# Patient Record
Sex: FEMALE | Race: WHITE | NOT HISPANIC OR LATINO | URBAN - METROPOLITAN AREA
[De-identification: names, ages, dates, MRNs, and addresses within clinical notes are randomized per-mention and may not be internally consistent; named-entity substitution may affect disease eponyms.]

---

## 2020-08-19 ENCOUNTER — APPOINTMENT (RX ONLY)
Dept: URBAN - METROPOLITAN AREA CLINIC 24 | Facility: CLINIC | Age: 75
Setting detail: DERMATOLOGY
End: 2020-08-19

## 2020-08-19 DIAGNOSIS — L85.3 XEROSIS CUTIS: ICD-10-CM

## 2020-08-19 DIAGNOSIS — D485 NEOPLASM OF UNCERTAIN BEHAVIOR OF SKIN: ICD-10-CM

## 2020-08-19 DIAGNOSIS — L29.8 OTHER PRURITUS: ICD-10-CM

## 2020-08-19 DIAGNOSIS — L29.89 OTHER PRURITUS: ICD-10-CM

## 2020-08-19 PROBLEM — D48.5 NEOPLASM OF UNCERTAIN BEHAVIOR OF SKIN: Status: ACTIVE | Noted: 2020-08-19

## 2020-08-19 PROCEDURE — 11102 TANGNTL BX SKIN SINGLE LES: CPT

## 2020-08-19 PROCEDURE — ? COUNSELING

## 2020-08-19 PROCEDURE — 99202 OFFICE O/P NEW SF 15 MIN: CPT | Mod: 25

## 2020-08-19 PROCEDURE — ? OTHER

## 2020-08-19 PROCEDURE — ? BIOPSY BY SHAVE METHOD

## 2020-08-19 ASSESSMENT — LOCATION DETAILED DESCRIPTION DERM
LOCATION DETAILED: RIGHT DISTAL RADIAL PALMAR SMALL FINGER
LOCATION DETAILED: RIGHT SUPERIOR UPPER BACK
LOCATION DETAILED: MIDDLE STERNUM
LOCATION DETAILED: SUPERIOR THORACIC SPINE

## 2020-08-19 ASSESSMENT — LOCATION SIMPLE DESCRIPTION DERM
LOCATION SIMPLE: RIGHT SMALL FINGER
LOCATION SIMPLE: RIGHT UPPER BACK
LOCATION SIMPLE: UPPER BACK
LOCATION SIMPLE: CHEST

## 2020-08-19 ASSESSMENT — LOCATION ZONE DERM
LOCATION ZONE: FINGER
LOCATION ZONE: TRUNK

## 2020-08-19 NOTE — PROCEDURE: OTHER
Note Text (......Xxx Chief Complaint.): This diagnosis correlates with the
Detail Level: Zone
Other (Free Text): Dr Queen assisted with the procedure. \\n\\nDiscussed she may need referral to hand surgeon if this recurs.
Other (Free Text): This only occurs during dialysis and persists despite pre-treatment w antihistamines and normalization of her phosphorus levels. I discussed that this will likely be difficult to treat and may not be curable. \\n\\nI discussed sensitive skin precautions and keeping skin well moisturized as she is xerotic. I also recommended applying cold sarna anti-itch or Cerave anti-itch prior to dialysis. Samples of Cerave given to daughter and sarna written down.

## 2020-08-19 NOTE — HPI: SKIN LESION
What Type Of Note Output Would You Prefer (Optional)?: Standard Output
How Severe Is Your Skin Lesion?: moderate
Has Your Skin Lesion Been Treated?: not been treated
Is This A New Presentation, Or A Follow-Up?: Growth
Additional History: Pt gives verbal consent to biopsy.Kemal

## 2020-08-19 NOTE — PROCEDURE: BIOPSY BY SHAVE METHOD
Consent: Verbal consent was obtained and risks were reviewed including but not limited to scarring, infection, bleeding, scabbing, incomplete removal, nerve damage and allergy to anesthesia.
Bill 27994 For Specimen Handling/Conveyance To Laboratory?: no
Electrodesiccation Text: The wound bed was treated with electrodesiccation after the biopsy was performed.
Wound Care: Vaseline
Additional Anesthesia Volume In Cc (Will Not Render If 0): 0
Depth Of Biopsy: dermis
Anesthesia Volume In Cc: 0.4
Type Of Destruction Used: Curettage
Dressing: bandage
Was A Bandage Applied: Yes
Notification Instructions: Patient will be notified of biopsy results. However, patient instructed to call the office if not contacted within 2 weeks.
Curettage Text: The wound bed was treated with curettage after the biopsy was performed.
Detail Level: Detailed
Cryotherapy Text: The wound bed was treated with cryotherapy after the biopsy was performed.
Electrodesiccation And Curettage Text: The wound bed was treated with electrodesiccation and curettage after the biopsy was performed.
Biopsy Type: H and E
Accession #: [id number]
Billing Type: Third-Party Bill
Silver Nitrate Text: The wound bed was treated with silver nitrate after the biopsy was performed.
Biopsy Method: Personna blade
Post-Care Instructions: I reviewed with the patient in detail post-care instructions. Patient is to keep the biopsy site dry overnight, and then apply vaseline twice daily until healed. Patient may apply hydrogen peroxide soaks to remove any crusting. Patient given a wound care sheet.
Information: Selecting Yes will display possible errors in your note based on the variables you have selected. This validation is only offered as a suggestion for you. PLEASE NOTE THAT THE VALIDATION TEXT WILL BE REMOVED WHEN YOU FINALIZE YOUR NOTE. IF YOU WANT TO FAX A PRELIMINARY NOTE YOU WILL NEED TO TOGGLE THIS TO 'NO' IF YOU DO NOT WANT IT IN YOUR FAXED NOTE.
Hemostasis: Aluminum Chloride and Electrocautery
Anesthesia Type: 1% lidocaine without epinephrine and a 1:10 solution of 8.4% sodium bicarbonate

## 2022-06-25 ENCOUNTER — INPATIENT (INPATIENT)
Facility: HOSPITAL | Age: 77
LOS: 7 days | Discharge: HOME | End: 2022-07-03
Attending: INTERNAL MEDICINE | Admitting: INTERNAL MEDICINE
Payer: MEDICARE

## 2022-06-25 VITALS — WEIGHT: 169.98 LBS

## 2022-06-25 LAB
BASOPHILS # BLD AUTO: 0.09 K/UL — SIGNIFICANT CHANGE UP (ref 0–0.2)
BASOPHILS NFR BLD AUTO: 0.7 % — SIGNIFICANT CHANGE UP (ref 0–2)
EOSINOPHIL # BLD AUTO: 0.61 K/UL — HIGH (ref 0–0.5)
EOSINOPHIL NFR BLD AUTO: 4.8 % — SIGNIFICANT CHANGE UP (ref 0–6)
HCT VFR BLD CALC: 24.1 % — LOW (ref 34.5–45)
HGB BLD-MCNC: 7.8 G/DL — LOW (ref 11.5–15.5)
IMM GRANULOCYTES NFR BLD AUTO: 0.5 % — SIGNIFICANT CHANGE UP (ref 0–1.5)
LYMPHOCYTES # BLD AUTO: 1.17 K/UL — SIGNIFICANT CHANGE UP (ref 1–3.3)
LYMPHOCYTES # BLD AUTO: 9.3 % — LOW (ref 13–44)
MCHC RBC-ENTMCNC: 28.3 PG — SIGNIFICANT CHANGE UP (ref 27–34)
MCHC RBC-ENTMCNC: 32.4 G/DL — SIGNIFICANT CHANGE UP (ref 32–36)
MCV RBC AUTO: 87.3 FL — SIGNIFICANT CHANGE UP (ref 80–100)
MONOCYTES # BLD AUTO: 0.63 K/UL — SIGNIFICANT CHANGE UP (ref 0–0.9)
MONOCYTES NFR BLD AUTO: 5 % — SIGNIFICANT CHANGE UP (ref 2–14)
NEUTROPHILS # BLD AUTO: 10.03 K/UL — HIGH (ref 1.8–7.4)
NEUTROPHILS NFR BLD AUTO: 79.7 % — HIGH (ref 43–77)
NRBC # BLD: 0 /100 WBCS — SIGNIFICANT CHANGE UP (ref 0–0)
PLATELET # BLD AUTO: 326 K/UL — SIGNIFICANT CHANGE UP (ref 150–400)
RBC # BLD: 2.76 M/UL — LOW (ref 3.8–5.2)
RBC # FLD: 15.9 % — HIGH (ref 10.3–14.5)
WBC # BLD: 12.59 K/UL — HIGH (ref 3.8–10.5)
WBC # FLD AUTO: 12.59 K/UL — HIGH (ref 3.8–10.5)

## 2022-06-25 PROCEDURE — 70450 CT HEAD/BRAIN W/O DYE: CPT | Mod: 26,MA

## 2022-06-25 PROCEDURE — 71045 X-RAY EXAM CHEST 1 VIEW: CPT | Mod: 26

## 2022-06-25 PROCEDURE — 93010 ELECTROCARDIOGRAM REPORT: CPT

## 2022-06-25 PROCEDURE — 99285 EMERGENCY DEPT VISIT HI MDM: CPT

## 2022-06-25 RX ORDER — ONDANSETRON 8 MG/1
4 TABLET, FILM COATED ORAL ONCE
Refills: 0 | Status: DISCONTINUED | OUTPATIENT
Start: 2022-06-25 | End: 2022-06-25

## 2022-06-25 NOTE — ED PROVIDER NOTE - NS ED ROS FT
No fever/chills, No photophobia/eye pain/changes in vision, No ear pain/sore throat/dysphagia, No palpitations, no cough/wheeze/stridor, No abdominal pain, No N/V/D, no dysuria/frequency/discharge, No neck/back pain, no rash, no changes in neurological status/function, (+) CP and SOB.

## 2022-06-25 NOTE — ED PROVIDER NOTE - CLINICAL SUMMARY MEDICAL DECISION MAKING FREE TEXT BOX
Patient with sob, does not want dialysis.  Stable on Bipap.  MOLST form completed with daughter.  ICU initially asked to consult until learned of advanced directives.  Patient currently being treated medically for hyperkalemia.  Although d/w nephrology, patient may not be dialyzed in am.  Will need hospice/palliative consult on admission. Patient is to be admitted to the hospital and the case was discussed with the admitting physician.  Any changes in plan, additional imaging/labs, and further work up will be at the discretion of the admitting physician. Per discussion with admitting physician, all necessary consults for admitted patients to be obtained by morning team unless patient requires emergent intervention or medical advice by specialist overnight.

## 2022-06-25 NOTE — ED PROVIDER NOTE - PHYSICAL EXAMINATION
Gen: Alert, NAD  Head: NC, AT   Eyes: PERRL, EOMI, normal lids/conjunctiva  ENT: normal hearing, patent oropharynx without erythema/exudate, uvula midline  Neck: supple, no tenderness, Trachea midline  Pulm: Bilateral BS, normal resp effort, no wheeze/stridor/retractions  CV: RRR, no M/R/G, 2+ radial and dp pulses bl, no edema  Abd: soft, NT/ND, +BS, no hepatosplenomegaly  Mskel: extremities x4 with normal ROM and no joint effusions. no ctl spine ttp.   Skin: no rash, no bruising   Neuro: AAOx3, no sensory/motor deficits, CN 2-12 intact Gen: Alert, distressed, vomiting  Head: NC, AT   Eyes: EOMI, normal lids/conjunctiva  ENT: normal hearing, patent oropharynx without erythema/exudate, uvula midline  Neck: supple, no tenderness, Trachea midline  Pulm: increased resp effort, + retractions, poor air entry  CV: RRR, no M/R/G, 2+ radial and dp pulses bl, no edema  Abd: soft, NT/ND, +BS, no hepatosplenomegaly  Mskel: extremities x4 with normal ROM and no joint effusions. no ctl spine ttp.   Skin: no rash, no bruising   Neuro: AAOx3, no sensory/motor deficits, CN 2-12 intact

## 2022-06-25 NOTE — ED ADULT NURSE NOTE - OBJECTIVE STATEMENT
AOx3, pt came in complaining of SOB. as per pts daughter, pt has had SOB for 2/3 days now. pt is on home O2 2L NC. as per pts daughter, SOB worsened today. pt is using accessory muscles to breathe, pt has bilateral crackles upon auscultation.   pt also complaining of CP 10/10 pain. as per pts daughter, pt always has CP. pt hasn't taken anything for the pain. pts daughter gave her 1 aspirin prior to coming in. pt has bilateral lower extremity swelling.     pmh: asthma, HTN, CHF, ESRD  pts last dialysis June 13th. L arm fistula AOx3, pt came in complaining of SOB. as per pts daughter, pt has had SOB for 2/3 days now. pt is on home O2 2L NC. as per pts daughter, SOB worsened today. pt is using accessory muscles to breathe, pt has bilateral crackles upon auscultation.   pt also complaining of CP 10/10 pain. as per pts daughter, pt always has CP. pt hasn't taken anything for the pain. pts daughter gave her 1 aspirin prior to coming in. pt has bilateral lower extremity swelling. pts BP is extremely elevated 200's/100's.    pmh: asthma, HTN, CHF, ESRD  pts last dialysis June 13th. L arm fistula AOx3, pt came in complaining of SOB. as per pts daughter, pt has had SOB for 2/3 days now. pt is on home O2 2L NC. as per pts daughter, SOB worsened today. pt is using accessory muscles to breathe, pt has bilateral crackles upon auscultation.   pt also complaining of CP 10/10 pain. as per pts daughter, pt always has CP. pt hasn't taken anything for the pain. pts daughter gave her 1 aspirin prior to coming in. pt has bilateral lower extremity swelling. pts BP is extremely elevated 200's/100's. as per pts daughter, pt took nifedipine 120mg and clonidine 0.3mg at 8:30 prior to coming in.   pmh: asthma, HTN, CHF, ESRD  pts last dialysis June 13th. L arm fistula

## 2022-06-25 NOTE — ED ADULT NURSE NOTE - ED STAT RN HANDOFF DETAILS
Report given to receiving RN Katya, pts history, current condition and reason for admission discussed, safety concerns addressed and reviewed, pt currently in stable condition, IV flushes for patency and site shows no signs or symptoms of infiltrate, dressing is clean dry and intact, pt is aware of plan of care. Pt education deemed successful at time of report after patient demonstrates successful teach back for proficiency.

## 2022-06-25 NOTE — ED PROVIDER NOTE - OBJECTIVE STATEMENT
Triage note- sob and chest pain CPAP,  dialisis more than week left arm angela    Pertinent PMH/PSH/FHx/SHx and Review of Systems contained within:   78 y/o F wPMH of HTN, glaucoma, depression and ESRD (TTS) presents to the ED accompanied with daughter for not having dialysis done for x1 week. Pt was recently admitted last week to [facility] for the same reason. Pt c/o of SOB and collapsed at home. Pt In the field was found to be in the x80's on RA. Pt endorses CP, SOB and vomiting in ER. Pt was brought in on cPAP. Denies LOC, vertigo, abd pain, dizziness, leg or calf swelling. Pt is anuric Triage note- sob and chest pain CPAP,  dialisis more than week left arm angela    Pertinent PMH/PSH/FHx/SHx and Review of Systems contained within:   78 y/o F wPMH of HTN, glaucoma, depression and ESRD (TTS) presents to the ED accompanied with daughter for sob not having dialysis done for x2 weeks. Pt was recently admitted to [facility] for the same reason. Pt c/o of SOB and collapsed at home. Pt In the field was found to be in the x80's on RA. Pt endorses CP, SOB in ER. Pt was brought in on cPAP, vomiting, however denies abdominal pain. Denies LOC, vertigo, abd pain, dizziness, leg or calf swelling. Pt is anuric.  Daughter states that the reason patient has not been dialyzed is because she does not want to go.  She wishes to be made hospice care, a process that they started at [facility], but has no home hospice care.  She is DNR/DNI per daughter.

## 2022-06-26 DIAGNOSIS — E87.5 HYPERKALEMIA: ICD-10-CM

## 2022-06-26 DIAGNOSIS — N18.4 CHRONIC KIDNEY DISEASE, STAGE 4 (SEVERE): ICD-10-CM

## 2022-06-26 DIAGNOSIS — Z51.5 ENCOUNTER FOR PALLIATIVE CARE: ICD-10-CM

## 2022-06-26 DIAGNOSIS — J96.90 RESPIRATORY FAILURE, UNSPECIFIED, UNSPECIFIED WHETHER WITH HYPOXIA OR HYPERCAPNIA: ICD-10-CM

## 2022-06-26 DIAGNOSIS — N18.6 END STAGE RENAL DISEASE: ICD-10-CM

## 2022-06-26 DIAGNOSIS — I10 ESSENTIAL (PRIMARY) HYPERTENSION: ICD-10-CM

## 2022-06-26 LAB
ALBUMIN SERPL ELPH-MCNC: 3 G/DL — LOW (ref 3.3–5)
ALP SERPL-CCNC: 85 U/L — SIGNIFICANT CHANGE UP (ref 40–120)
ALT FLD-CCNC: 34 U/L — SIGNIFICANT CHANGE UP (ref 12–78)
AMYLASE P1 CFR SERPL: 66 U/L — SIGNIFICANT CHANGE UP (ref 25–115)
ANION GAP SERPL CALC-SCNC: 14 MMOL/L — SIGNIFICANT CHANGE UP (ref 5–17)
ANION GAP SERPL CALC-SCNC: 17 MMOL/L — SIGNIFICANT CHANGE UP (ref 5–17)
APTT BLD: 35.9 SEC — HIGH (ref 27.5–35.5)
AST SERPL-CCNC: 29 U/L — SIGNIFICANT CHANGE UP (ref 15–37)
BILIRUB SERPL-MCNC: 0.3 MG/DL — SIGNIFICANT CHANGE UP (ref 0.2–1.2)
BLD GP AB SCN SERPL QL: SIGNIFICANT CHANGE UP
BUN SERPL-MCNC: 109 MG/DL — HIGH (ref 7–23)
BUN SERPL-MCNC: 120 MG/DL — HIGH (ref 7–23)
CALCIUM SERPL-MCNC: 8.4 MG/DL — LOW (ref 8.5–10.1)
CALCIUM SERPL-MCNC: 8.8 MG/DL — SIGNIFICANT CHANGE UP (ref 8.5–10.1)
CHLORIDE SERPL-SCNC: 105 MMOL/L — SIGNIFICANT CHANGE UP (ref 96–108)
CHLORIDE SERPL-SCNC: 106 MMOL/L — SIGNIFICANT CHANGE UP (ref 96–108)
CO2 SERPL-SCNC: 16 MMOL/L — LOW (ref 22–31)
CO2 SERPL-SCNC: 19 MMOL/L — LOW (ref 22–31)
CREAT SERPL-MCNC: 16.6 MG/DL — HIGH (ref 0.5–1.3)
CREAT SERPL-MCNC: 16.8 MG/DL — HIGH (ref 0.5–1.3)
EGFR: 2 ML/MIN/1.73M2 — LOW
EGFR: 2 ML/MIN/1.73M2 — LOW
FLUAV AG NPH QL: SIGNIFICANT CHANGE UP
FLUBV AG NPH QL: SIGNIFICANT CHANGE UP
GLUCOSE SERPL-MCNC: 194 MG/DL — HIGH (ref 70–99)
GLUCOSE SERPL-MCNC: 91 MG/DL — SIGNIFICANT CHANGE UP (ref 70–99)
INR BLD: 1.07 RATIO — SIGNIFICANT CHANGE UP (ref 0.88–1.16)
LIDOCAIN IGE QN: 227 U/L — SIGNIFICANT CHANGE UP (ref 73–393)
NT-PROBNP SERPL-SCNC: HIGH PG/ML (ref 0–450)
POTASSIUM SERPL-MCNC: 5.5 MMOL/L — HIGH (ref 3.5–5.3)
POTASSIUM SERPL-MCNC: 6.2 MMOL/L — CRITICAL HIGH (ref 3.5–5.3)
POTASSIUM SERPL-SCNC: 5.5 MMOL/L — HIGH (ref 3.5–5.3)
POTASSIUM SERPL-SCNC: 6.2 MMOL/L — CRITICAL HIGH (ref 3.5–5.3)
PROT SERPL-MCNC: 7.9 GM/DL — SIGNIFICANT CHANGE UP (ref 6–8.3)
PROTHROM AB SERPL-ACNC: 12.9 SEC — SIGNIFICANT CHANGE UP (ref 10.5–13.4)
SARS-COV-2 RNA SPEC QL NAA+PROBE: SIGNIFICANT CHANGE UP
SODIUM SERPL-SCNC: 138 MMOL/L — SIGNIFICANT CHANGE UP (ref 135–145)
SODIUM SERPL-SCNC: 139 MMOL/L — SIGNIFICANT CHANGE UP (ref 135–145)
TROPONIN I, HIGH SENSITIVITY RESULT: 55.6 NG/L — HIGH

## 2022-06-26 PROCEDURE — 99223 1ST HOSP IP/OBS HIGH 75: CPT

## 2022-06-26 RX ORDER — LANOLIN ALCOHOL/MO/W.PET/CERES
3 CREAM (GRAM) TOPICAL AT BEDTIME
Refills: 0 | Status: DISCONTINUED | OUTPATIENT
Start: 2022-06-26 | End: 2022-07-03

## 2022-06-26 RX ORDER — SODIUM ZIRCONIUM CYCLOSILICATE 10 G/10G
10 POWDER, FOR SUSPENSION ORAL ONCE
Refills: 0 | Status: COMPLETED | OUTPATIENT
Start: 2022-06-26 | End: 2022-06-26

## 2022-06-26 RX ORDER — MORPHINE SULFATE 50 MG/1
1 CAPSULE, EXTENDED RELEASE ORAL
Refills: 0 | Status: DISCONTINUED | OUTPATIENT
Start: 2022-06-26 | End: 2022-06-27

## 2022-06-26 RX ORDER — ALBUTEROL 90 UG/1
2.5 AEROSOL, METERED ORAL
Refills: 0 | Status: COMPLETED | OUTPATIENT
Start: 2022-06-26 | End: 2022-06-26

## 2022-06-26 RX ORDER — ACETAMINOPHEN 500 MG
650 TABLET ORAL EVERY 6 HOURS
Refills: 0 | Status: DISCONTINUED | OUTPATIENT
Start: 2022-06-26 | End: 2022-07-03

## 2022-06-26 RX ORDER — ONDANSETRON 8 MG/1
4 TABLET, FILM COATED ORAL EVERY 8 HOURS
Refills: 0 | Status: DISCONTINUED | OUTPATIENT
Start: 2022-06-26 | End: 2022-06-29

## 2022-06-26 RX ORDER — CALCIUM GLUCONATE 100 MG/ML
1 VIAL (ML) INTRAVENOUS ONCE
Refills: 0 | Status: COMPLETED | OUTPATIENT
Start: 2022-06-26 | End: 2022-06-26

## 2022-06-26 RX ADMIN — Medication 100 GRAM(S): at 02:12

## 2022-06-26 RX ADMIN — MORPHINE SULFATE 1 MILLIGRAM(S): 50 CAPSULE, EXTENDED RELEASE ORAL at 18:54

## 2022-06-26 RX ADMIN — ALBUTEROL 2.5 MILLIGRAM(S): 90 AEROSOL, METERED ORAL at 01:55

## 2022-06-26 RX ADMIN — MORPHINE SULFATE 1 MILLIGRAM(S): 50 CAPSULE, EXTENDED RELEASE ORAL at 18:24

## 2022-06-26 RX ADMIN — Medication 10 MILLIGRAM(S): at 00:21

## 2022-06-26 RX ADMIN — SODIUM ZIRCONIUM CYCLOSILICATE 10 GRAM(S): 10 POWDER, FOR SUSPENSION ORAL at 02:47

## 2022-06-26 RX ADMIN — Medication 650 MILLIGRAM(S): at 20:29

## 2022-06-26 RX ADMIN — ALBUTEROL 2.5 MILLIGRAM(S): 90 AEROSOL, METERED ORAL at 02:08

## 2022-06-26 RX ADMIN — ALBUTEROL 2.5 MILLIGRAM(S): 90 AEROSOL, METERED ORAL at 01:34

## 2022-06-26 RX ADMIN — Medication 650 MILLIGRAM(S): at 21:10

## 2022-06-26 NOTE — PROGRESS NOTE ADULT - SUBJECTIVE AND OBJECTIVE BOX
Resting in bed NAD. Afebrile Hemodynamically stable. No acute events.    Physical Exam: Constitutional: AAO  HEENT PERRLA EOMI  CV RRR S1S2  Pulm poor entry b/l, retraction, increased effort   GI soft nontender nondistended + BS   Neuro CN II-XII grossly intact   Extremities no edema or calf tenderness

## 2022-06-26 NOTE — ED ADULT NURSE REASSESSMENT NOTE - COMFORT CARE
po fluids offered/repositioned
plan of care explained/side rails up/wait time explained/warm blanket provided

## 2022-06-26 NOTE — H&P ADULT - ASSESSMENT
This is a 78 yo F with Hx of HTN, glaucoma, depression and ESRD (TTS), presenting to the ED with daughter due to sob and collapse at home. Patient and daughter wish for her to be on hospice care. She has intentionally skipped HD x 2w and as a result has gotten more sob 80% sPO2 on RA requiring cPAP in ED. She was recently admitted to [facility] for the same reason. Pt c/o of SOB and collapsed at home. one episode of nbnb vomiting in ED. Denies LOC, vertigo, abd pain, dizziness, leg or calf swelling. Pt is anuric.  She is DNR/DNI per daughter but would take noninvasive comfort measures. labs significant for wbx 12.5, hgb 7.8, k 6.2, trop 55, pBNP 99k, creat 16    end of life care   HTN  Glaucoma  ESRD on HD  depression   hyperkalemia   anemia of renal disease   -hospice and palliative consult   -morphine prn for air hunger and ativan prn for anxiety  -Supplemental O2 for comfort (would ideally switch to NC or mask with morphine)  -resume home meds and daily labs till palliative establishes more exact goals of care   -regular renal diet   -DNR DNI

## 2022-06-26 NOTE — ED ADULT NURSE REASSESSMENT NOTE - NS ED NURSE REASSESS COMMENT FT1
Spoke to Dr. Azul to d/c Bipap orders and enter new orders for venturi mask.  Pt sat 100% on venturi mask, NAD, pt resting in bed asleep.
Pt placed on 40% ventimask without incident after receiving morphine 1mg iv push. Respirations even and unlabored at this time, spo2 100% on ventimask. Pt had a few sips of water as per request. Pt denies cp, dizziness, n/v, chills, palpitations. NAD noted at this time. Pt is able to make all needs known. Call bell in reach. Comfort and safety maintained.
Report received from Bekah RN, care assumed at this time. Pt received on bipap fio2 40%, spo2 %. Cardiac and spo2 monitoring in place. Pt's daughter Socorro at bedside. Comfort and safety maintained.

## 2022-06-26 NOTE — CONSULT NOTE ADULT - SUBJECTIVE AND OBJECTIVE BOX
Patient chart reviewed, full consult to follow.     Non adherent ESRD fluid overload     Will dialyze today     Thank you for the courtesy of this consultation.         Patient chart reviewed, full consult to follow.     Patient no longer wants HD treatments    Hospice care;     Thank you for the courtesy of this consultation. [facility] NEPHROLOGY SERVICES, [facility]  NEPHROLOGY AND HYPERTENSION  [address]  [address]  [place] [address]  [phone]    MD MATTHEW PATIÑO MD ANDREY GONCHARUK, MD MADHU KORRAPATI, MD YELENA ROSENBERG, MD BINNY KOSHY, MD CHRISTOPHER CAPUTO, MD EDWARD BOVER, MD      Information from chart:  "Patient is a 77y old  Female who presents with a chief complaint of   HPI:  This is a 76 yo F with Hx of HTN, glaucoma, depression and ESRD (TTS), presenting to the ED with daughter due to sob and collapse at home. Patient and daughter wish for her to be on hospice care. She has intentionally skipped HD x 2w and as a result has gotten more sob 80% sPO2 on RA requiring cPAP in ED. She was recently admitted to [facility] for the same reason. Pt c/o of SOB and collapsed at home. one episode of nbnb vomiting in ED. Denies LOC, vertigo, abd pain, dizziness, leg or calf swelling. Pt is anuric.  She is DNR/DNI per daughter but would take noninvasive comfort measures. labs significant for wbx 12.5, hgb 7.8, k 6.2, trop 55, pBNP 99k, creat 16 (26 Jun 2022 06:10)   "  Patient no longer wants to continue HD    PAST MEDICAL & SURGICAL HISTORY:    FAMILY HISTORY:    Allergies    No Known Allergies    Intolerances      Home Medications:    MEDICATIONS  (STANDING):    MEDICATIONS  (PRN):  acetaminophen     Tablet .. 650 milliGRAM(s) Oral every 6 hours PRN Temp greater or equal to 38C (100.4F), Mild Pain (1 - 3)  aluminum hydroxide/magnesium hydroxide/simethicone Suspension 30 milliLiter(s) Oral every 4 hours PRN Dyspepsia  LORazepam   Injectable 1 milliGRAM(s) IV Push every 4 hours PRN Anxiety  melatonin 3 milliGRAM(s) Oral at bedtime PRN Insomnia  morphine  - Injectable 1 milliGRAM(s) IV Push every 2 hours PRN Moderate Pain (4 - 6)  ondansetron Injectable 4 milliGRAM(s) IV Push every 8 hours PRN Nausea and/or Vomiting    Vital Signs Last 24 Hrs  T(C): 36.5 (27 Jun 2022 00:36), Max: 37 (26 Jun 2022 21:04)  T(F): 97.7 (27 Jun 2022 00:36), Max: 98.6 (26 Jun 2022 21:04)  HR: 67 (27 Jun 2022 00:36) (59 - 90)  BP: 198/75 (27 Jun 2022 00:36) (127/65 - 198/96)  BP(mean): --  RR: 17 (27 Jun 2022 00:36) (13 - 18)  SpO2: 98% (27 Jun 2022 00:36) (98% - 100%)    Daily Height in cm: 154.94 (27 Jun 2022 00:36)    Daily     CAPILLARY BLOOD GLUCOSE        PHYSICAL EXAM:      T(C): 36.5 (06-27-22 @ 00:36), Max: 37 (06-26-22 @ 21:04)  HR: 67 (06-27-22 @ 00:36) (59 - 90)  BP: 198/75 (06-27-22 @ 00:36) (127/65 - 198/96)  RR: 17 (06-27-22 @ 00:36) (13 - 18)  SpO2: 98% (06-27-22 @ 00:36) (98% - 100%)  Wt(kg): --  Lungs clear ant decreased BS at bases   Heart S1S2  Abd soft NT ND  Extremities:   tr edema  L avf               06-26    139  |  106  |  120<H>  ----------------------------<  91  5.5<H>   |  19<L>  |  16.80<H>    Ca    8.8      26 Jun 2022 10:40    TPro  7.9  /  Alb  3.0<L>  /  TBili  0.3  /  DBili  x   /  AST  29  /  ALT  34  /  AlkPhos  85  06-25                          7.8    12.59 )-----------( 326      ( 25 Jun 2022 22:58 )             24.1     Creatinine Trend: 16.80<--, 16.60<--          Assessment   Patient no longer wants HD treatments      Plan  Hospice care    Harry Osborn MD      Thank you for the courtesy of this consultation.

## 2022-06-26 NOTE — H&P ADULT - NSHPPHYSICALEXAM_GEN_ALL_CORE
Constitutional: AAO  HEENT PERRLA EOMI  CV RRR S1S2  Pulm poor entry b/l, retraction, increased effort   GI soft nontender nondistended + BS   Neuro CN II-XII grossly intact   Extremities no edema or calf tenderness

## 2022-06-27 DIAGNOSIS — E44.1 MILD PROTEIN-CALORIE MALNUTRITION: ICD-10-CM

## 2022-06-27 DIAGNOSIS — D64.9 ANEMIA, UNSPECIFIED: ICD-10-CM

## 2022-06-27 DIAGNOSIS — R53.1 WEAKNESS: ICD-10-CM

## 2022-06-27 DIAGNOSIS — Z51.5 ENCOUNTER FOR PALLIATIVE CARE: ICD-10-CM

## 2022-06-27 DIAGNOSIS — R06.02 SHORTNESS OF BREATH: ICD-10-CM

## 2022-06-27 DIAGNOSIS — I10 ESSENTIAL (PRIMARY) HYPERTENSION: ICD-10-CM

## 2022-06-27 LAB
ANION GAP SERPL CALC-SCNC: 15 MMOL/L — SIGNIFICANT CHANGE UP (ref 5–17)
BUN SERPL-MCNC: 115 MG/DL — HIGH (ref 7–23)
CALCIUM SERPL-MCNC: 8.1 MG/DL — LOW (ref 8.5–10.1)
CHLORIDE SERPL-SCNC: 104 MMOL/L — SIGNIFICANT CHANGE UP (ref 96–108)
CK MB BLD-MCNC: 1.7 % — SIGNIFICANT CHANGE UP (ref 0–3.5)
CK MB CFR SERPL CALC: 1.9 NG/ML — SIGNIFICANT CHANGE UP (ref 0.5–3.6)
CK SERPL-CCNC: 109 U/L — SIGNIFICANT CHANGE UP (ref 26–192)
CO2 SERPL-SCNC: 18 MMOL/L — LOW (ref 22–31)
CREAT SERPL-MCNC: 17.3 MG/DL — HIGH (ref 0.5–1.3)
EGFR: 2 ML/MIN/1.73M2 — LOW
GLUCOSE BLDC GLUCOMTR-MCNC: 125 MG/DL — HIGH (ref 70–99)
GLUCOSE SERPL-MCNC: 80 MG/DL — SIGNIFICANT CHANGE UP (ref 70–99)
HCT VFR BLD CALC: 21.1 % — LOW (ref 34.5–45)
HCV AB S/CO SERPL IA: 0.14 S/CO — SIGNIFICANT CHANGE UP (ref 0–0.99)
HCV AB SERPL-IMP: SIGNIFICANT CHANGE UP
HGB BLD-MCNC: 6.9 G/DL — CRITICAL LOW (ref 11.5–15.5)
MAGNESIUM SERPL-MCNC: 2.6 MG/DL — SIGNIFICANT CHANGE UP (ref 1.6–2.6)
MCHC RBC-ENTMCNC: 28 PG — SIGNIFICANT CHANGE UP (ref 27–34)
MCHC RBC-ENTMCNC: 32.7 G/DL — SIGNIFICANT CHANGE UP (ref 32–36)
MCV RBC AUTO: 85.8 FL — SIGNIFICANT CHANGE UP (ref 80–100)
NRBC # BLD: 0 /100 WBCS — SIGNIFICANT CHANGE UP (ref 0–0)
NRBC # BLD: 0 /100 WBCS — SIGNIFICANT CHANGE UP (ref 0–0)
PLATELET # BLD AUTO: 265 K/UL — SIGNIFICANT CHANGE UP (ref 150–400)
POTASSIUM SERPL-MCNC: 5.7 MMOL/L — HIGH (ref 3.5–5.3)
POTASSIUM SERPL-SCNC: 5.7 MMOL/L — HIGH (ref 3.5–5.3)
RBC # BLD: 2.46 M/UL — LOW (ref 3.8–5.2)
RBC # FLD: 15.7 % — HIGH (ref 10.3–14.5)
SODIUM SERPL-SCNC: 137 MMOL/L — SIGNIFICANT CHANGE UP (ref 135–145)
TROPONIN I, HIGH SENSITIVITY RESULT: 130 NG/L — HIGH
WBC # BLD: 9.82 K/UL — SIGNIFICANT CHANGE UP (ref 3.8–10.5)
WBC # FLD AUTO: 9.82 K/UL — SIGNIFICANT CHANGE UP (ref 3.8–10.5)

## 2022-06-27 PROCEDURE — 99223 1ST HOSP IP/OBS HIGH 75: CPT

## 2022-06-27 PROCEDURE — 99233 SBSQ HOSP IP/OBS HIGH 50: CPT

## 2022-06-27 PROCEDURE — 93010 ELECTROCARDIOGRAM REPORT: CPT

## 2022-06-27 RX ORDER — IPRATROPIUM/ALBUTEROL SULFATE 18-103MCG
3 AEROSOL WITH ADAPTER (GRAM) INHALATION EVERY 6 HOURS
Refills: 0 | Status: DISCONTINUED | OUTPATIENT
Start: 2022-06-27 | End: 2022-07-01

## 2022-06-27 RX ORDER — HYDRALAZINE HCL 50 MG
10 TABLET ORAL ONCE
Refills: 0 | Status: COMPLETED | OUTPATIENT
Start: 2022-06-27 | End: 2022-06-27

## 2022-06-27 RX ORDER — SODIUM ZIRCONIUM CYCLOSILICATE 10 G/10G
10 POWDER, FOR SUSPENSION ORAL ONCE
Refills: 0 | Status: DISCONTINUED | OUTPATIENT
Start: 2022-06-27 | End: 2022-06-27

## 2022-06-27 RX ORDER — HEPARIN SODIUM 5000 [USP'U]/ML
5000 INJECTION INTRAVENOUS; SUBCUTANEOUS EVERY 12 HOURS
Refills: 0 | Status: DISCONTINUED | OUTPATIENT
Start: 2022-06-27 | End: 2022-07-03

## 2022-06-27 RX ORDER — ASPIRIN/CALCIUM CARB/MAGNESIUM 324 MG
325 TABLET ORAL ONCE
Refills: 0 | Status: COMPLETED | OUTPATIENT
Start: 2022-06-27 | End: 2022-06-27

## 2022-06-27 RX ORDER — CARVEDILOL PHOSPHATE 80 MG/1
6.25 CAPSULE, EXTENDED RELEASE ORAL EVERY 12 HOURS
Refills: 0 | Status: DISCONTINUED | OUTPATIENT
Start: 2022-06-27 | End: 2022-06-28

## 2022-06-27 RX ORDER — AMLODIPINE BESYLATE 2.5 MG/1
5 TABLET ORAL DAILY
Refills: 0 | Status: DISCONTINUED | OUTPATIENT
Start: 2022-06-27 | End: 2022-06-28

## 2022-06-27 RX ORDER — HYDRALAZINE HCL 50 MG
5 TABLET ORAL EVERY 6 HOURS
Refills: 0 | Status: DISCONTINUED | OUTPATIENT
Start: 2022-06-27 | End: 2022-07-03

## 2022-06-27 RX ORDER — LABETALOL HCL 100 MG
10 TABLET ORAL ONCE
Refills: 0 | Status: COMPLETED | OUTPATIENT
Start: 2022-06-27 | End: 2022-06-27

## 2022-06-27 RX ADMIN — Medication 650 MILLIGRAM(S): at 22:12

## 2022-06-27 RX ADMIN — Medication 3 MILLILITER(S): at 14:41

## 2022-06-27 RX ADMIN — Medication 10 MILLIGRAM(S): at 14:23

## 2022-06-27 RX ADMIN — Medication 650 MILLIGRAM(S): at 16:46

## 2022-06-27 RX ADMIN — Medication 10 MILLIGRAM(S): at 14:25

## 2022-06-27 RX ADMIN — ONDANSETRON 4 MILLIGRAM(S): 8 TABLET, FILM COATED ORAL at 14:23

## 2022-06-27 RX ADMIN — Medication 325 MILLIGRAM(S): at 15:46

## 2022-06-27 RX ADMIN — Medication 3 MILLILITER(S): at 17:03

## 2022-06-27 RX ADMIN — Medication 3 MILLIGRAM(S): at 01:30

## 2022-06-27 RX ADMIN — Medication 650 MILLIGRAM(S): at 23:10

## 2022-06-27 RX ADMIN — Medication 650 MILLIGRAM(S): at 13:46

## 2022-06-27 RX ADMIN — Medication 0.1 MILLIGRAM(S): at 13:46

## 2022-06-27 RX ADMIN — Medication 1 MILLIGRAM(S): at 15:16

## 2022-06-27 RX ADMIN — Medication 1 MILLIGRAM(S): at 01:22

## 2022-06-27 NOTE — PROGRESS NOTE ADULT - SUBJECTIVE AND OBJECTIVE BOX
Patient is a 77y old  Female who presents with a chief complaint of       HPI:  This is a 76 yo F with Hx of HTN, glaucoma, depression and ESRD (TTS), presenting to the ED with daughter due to sob and collapse at home. Patient and daughter wish for her to be on hospice care. She has intentionally skipped HD x 2w and as a result has gotten more sob 80% sPO2 on RA requiring cPAP in ED. She was recently admitted to [facility] for the same reason. Pt c/o of SOB and collapsed at home. one episode of nbnb vomiting in ED. Denies LOC, vertigo, abd pain, dizziness, leg or calf swelling. Pt is anuric.  She is DNR/DNI per daughter but would take noninvasive comfort measures. labs significant for wbx 12.5, hgb 7.8, k 6.2, trop 55, pBNP 99k, creat 16 (26 Jun 2022 06:10)      SUBJECTIVE & OBJECTIVE: Pt seen and examined at bedside.   no overnight events.         PHYSICAL EXAM:  T(C): 36.7 (06-27-22 @ 17:08), Max: 37 (06-26-22 @ 21:04)  HR: 71 (06-27-22 @ 17:15) (59 - 78)  BP: 233/112 (06-27-22 @ 17:08) (139/68 - 238/98)  RR: 20 (06-27-22 @ 14:40) (13 - 24)  SpO2: 100% (06-27-22 @ 17:15) (97% - 100%)  Wt(kg): -- Height (cm): 154.9 (06-27 @ 00:36)  Weight (kg): 77.2 (06-27 @ 00:36)  BMI (kg/m2): 32.2 (06-27 @ 00:36)  BSA (m2): 1.76 (06-27 @ 00:36)  I&O's Detail    27 Jun 2022 07:01  -  27 Jun 2022 17:41  --------------------------------------------------------  IN:    Oral Fluid: 780 mL  Total IN: 780 mL    OUT:  Total OUT: 0 mL    Total NET: 780 mL        GENERAL: NAD, well-groomed, well-developed, no increased WOB  HEAD:  Atraumatic, Normocephalic  EYES: EOMI, PERRLA, conjunctiva and sclera clear  ENMT: Moist mucous membranes  NECK: Supple, No JVD  NERVOUS SYSTEM:  Alert & Oriented X3, no focal neuro deficits   CHEST/LUNG: Clear to auscultation bilaterally; No rales, rhonchi, wheezing, or rubs  HEART: Regular rate and rhythm; No murmurs, rubs, or gallops  ABDOMEN: Soft, Nontender, Nondistended; Bowel sounds present  EXTREMITIES:  2+ Peripheral Pulses b/l, No clubbing, cyanosis, calf tenderness or edema b/l    MEDICATIONS  (STANDING):  albuterol/ipratropium for Nebulization 3 milliLiter(s) Nebulizer every 6 hours  amLODIPine   Tablet 5 milliGRAM(s) Oral daily  carvedilol 6.25 milliGRAM(s) Oral every 12 hours  heparin   Injectable 5000 Unit(s) SubCutaneous every 12 hours    MEDICATIONS  (PRN):  acetaminophen     Tablet .. 650 milliGRAM(s) Oral every 6 hours PRN Temp greater or equal to 38C (100.4F), Mild Pain (1 - 3)  aluminum hydroxide/magnesium hydroxide/simethicone Suspension 30 milliLiter(s) Oral every 4 hours PRN Dyspepsia  hydrALAZINE Injectable 5 milliGRAM(s) IV Push every 6 hours PRN if SBP >170 or DBP >100  LORazepam   Injectable 1 milliGRAM(s) IV Push every 4 hours PRN Anxiety  melatonin 3 milliGRAM(s) Oral at bedtime PRN Insomnia  ondansetron Injectable 4 milliGRAM(s) IV Push every 8 hours PRN Nausea and/or Vomiting      LABS:                        7.8    12.59 )-----------( 326      ( 25 Jun 2022 22:58 )             24.1     06-27    137  |  104  |  115<H>  ----------------------------<  80  5.7<H>   |  18<L>  |  17.30<H>    Ca    8.1<L>      27 Jun 2022 07:05  Mg     2.6     06-27    TPro  7.9  /  Alb  3.0<L>  /  TBili  0.3  /  DBili  x   /  AST  29  /  ALT  34  /  AlkPhos  85  06-25    PT/INR - ( 25 Jun 2022 22:58 )   PT: 12.9 sec;   INR: 1.07 ratio         PTT - ( 25 Jun 2022 22:58 )  PTT:35.9 sec    Magnesium, Serum: 2.6 mg/dL (06-27 @ 07:05)    CAPILLARY BLOOD GLUCOSE      POCT Blood Glucose.: 125 mg/dL (27 Jun 2022 14:16)            RECENT CULTURES:      RADIOLOGY & ADDITIONAL TESTS:          Imaging Personally Reviewed:  [ ] YES  [ ] NO    Consultant(s) Notes Reviewed:  [x ] YES  [ ] NO    Care Discussed with Consultants/Other Providers [x ] YES  [ ] NO     HPI:  This is a 76 yo F with Hx of HTN, glaucoma, depression and ESRD (TTS), presenting to the ED with daughter due to sob and collapse at home. Patient and daughter wish for her to be on hospice care. She has intentionally skipped HD x 2w and as a result has gotten more sob 80% sPO2 on RA requiring cPAP in ED. She was recently admitted to [facility] for the same reason. Pt c/o of SOB and collapsed at home. one episode of nbnb vomiting in ED. Denies LOC, vertigo, abd pain, dizziness, leg or calf swelling. Pt is anuric.  She is DNR/DNI per daughter but would take noninvasive comfort measures. labs significant for wbx 12.5, hgb 7.8, k 6.2, trop 55, pBNP 99k, creat 16 (26 Jun 2022 06:10)      SUBJECTIVE & OBJECTIVE:   Pt seen and examined at bedside.   no overnight events.   today was RRT for HTN with SBP 230s, diaphoretic and SOB , given hydralazine 10mg IVP, labetalol 10mg IVP, BIPAP continued. Nephrology Dr. Osborn at bedside to resume HD as patient wishes to continue HD. Patient moved to a HD bed.     +SOB  chronic cough for years, dry   on BIPAP         PHYSICAL EXAM:  T(C): 36.7 (06-27-22 @ 17:08), Max: 37 (06-26-22 @ 21:04)  HR: 71 (06-27-22 @ 17:15) (59 - 78)  BP: 233/112 (06-27-22 @ 17:08) (139/68 - 238/98)  RR: 20 (06-27-22 @ 14:40) (13 - 24)  SpO2: 100% (06-27-22 @ 17:15) (97% - 100%)  Wt(kg): -- Height (cm): 154.9 (06-27 @ 00:36)  Weight (kg): 77.2 (06-27 @ 00:36)  BMI (kg/m2): 32.2 (06-27 @ 00:36)  BSA (m2): 1.76 (06-27 @ 00:36)  I&O's Detail    27 Jun 2022 07:01  -  27 Jun 2022 17:41  --------------------------------------------------------  IN:    Oral Fluid: 780 mL  Total IN: 780 mL    OUT:  Total OUT: 0 mL    Total NET: 780 mL        GENERAL: obese,  on BIPAP,    HEAD:  Atraumatic, Normocephalic  EYES: EOMI, PERRLA, conjunctiva and sclera clear  ENMT: Moist mucous membranes  NECK: Supple, No JVD  NERVOUS SYSTEM:  Alert & Oriented X3, no focal neuro deficits   CHEST/LUNG: good b/l air entry , bibasilar crackles   HEART: Regular rate and rhythm; No murmurs, rubs, or gallops  ABDOMEN: Soft, Nontender, Nondistended; Bowel sounds present  EXTREMITIES:  2+ Peripheral Pulses b/l, No clubbing, cyanosis, calf tenderness or edema b/l    MEDICATIONS  (STANDING):  albuterol/ipratropium for Nebulization 3 milliLiter(s) Nebulizer every 6 hours  amLODIPine   Tablet 5 milliGRAM(s) Oral daily  carvedilol 6.25 milliGRAM(s) Oral every 12 hours  heparin   Injectable 5000 Unit(s) SubCutaneous every 12 hours    MEDICATIONS  (PRN):  acetaminophen     Tablet .. 650 milliGRAM(s) Oral every 6 hours PRN Temp greater or equal to 38C (100.4F), Mild Pain (1 - 3)  aluminum hydroxide/magnesium hydroxide/simethicone Suspension 30 milliLiter(s) Oral every 4 hours PRN Dyspepsia  hydrALAZINE Injectable 5 milliGRAM(s) IV Push every 6 hours PRN if SBP >170 or DBP >100  LORazepam   Injectable 1 milliGRAM(s) IV Push every 4 hours PRN Anxiety  melatonin 3 milliGRAM(s) Oral at bedtime PRN Insomnia  ondansetron Injectable 4 milliGRAM(s) IV Push every 8 hours PRN Nausea and/or Vomiting      LABS:                        7.8    12.59 )-----------( 326      ( 25 Jun 2022 22:58 )             24.1     06-27    137  |  104  |  115<H>  ----------------------------<  80  5.7<H>   |  18<L>  |  17.30<H>    Ca    8.1<L>      27 Jun 2022 07:05  Mg     2.6     06-27    TPro  7.9  /  Alb  3.0<L>  /  TBili  0.3  /  DBili  x   /  AST  29  /  ALT  34  /  AlkPhos  85  06-25    PT/INR - ( 25 Jun 2022 22:58 )   PT: 12.9 sec;   INR: 1.07 ratio         PTT - ( 25 Jun 2022 22:58 )  PTT:35.9 sec    Magnesium, Serum: 2.6 mg/dL (06-27 @ 07:05)    CAPILLARY BLOOD GLUCOSE      POCT Blood Glucose.: 125 mg/dL (27 Jun 2022 14:16)            RECENT CULTURES:      RADIOLOGY & ADDITIONAL TESTS:          Imaging Personally Reviewed:  [ ] YES  [ ] NO    Consultant(s) Notes Reviewed:  [x ] YES  [ ] NO    Care Discussed with Consultants/Other Providers [x ] YES  [ ] NO  Care discussed in detail with patient.  All questions and concerns addressed

## 2022-06-27 NOTE — CONSULT NOTE ADULT - PROBLEM SELECTOR RECOMMENDATION 9
breathing feels improved since arrival  could consider low-dose opioids PRN for dyspnea.  Dilaudid or fentanyl in patient's with renal issues   supplemental oxygen via NC breathing feels improved since arrival  could consider low-dose opioids PRN for dyspnea.  Dilaudid or fentanyl in light of ESRD   supplemental oxygen via NC

## 2022-06-27 NOTE — CONSULT NOTE ADULT - SUBJECTIVE AND OBJECTIVE BOX
HPI:  This is a 76 yo F with Hx of HTN, glaucoma, depression and ESRD (TTS), presenting to the ED with daughter due to sob and collapse at home. Patient and daughter wish for her to be on hospice care. She has intentionally skipped HD x 2w and as a result has gotten more sob 80% sPO2 on RA requiring cPAP in ED. She was recently admitted to [facility] for the same reason. Pt c/o of SOB and collapsed at home. one episode of nbnb vomiting in ED. Denies LOC, vertigo, abd pain, dizziness, leg or calf swelling. Pt is anuric.  She is DNR/DNI per daughter but would take noninvasive comfort measures. labs significant for wbx 12.5, hgb 7.8, k 6.2, trop 55, pBNP 99k, creat 16 (26 Jun 2022 06:10)    PERTINENT PM/SXH:       FAMILY HISTORY:    ITEMS NOT CHECKED ARE NOT PRESENT    SOCIAL HISTORY:   Significant other/partner:  [ ]  Children: yes [ ]  Hoahaoism/Spirituality: Judaism  Substance hx:  [ ]   Tobacco hx:  [ ]   Alcohol hx: [ ]   Home Opioid hx:  [ ] I-Stop Reference No: Reference #: [id number]  Living Situation: [x ]Home  [ ]Long term care  [ ]Rehab [ ]Other    ADVANCE DIRECTIVES:    DNR  MOLST  [x ]  Living Will  [ ]   DECISION MAKER(s):  [ ] Health Care Proxy(s)  [ x] Surrogate(s)  [ ] Guardian           Name(s): Phone Number(s): Socorro Toro [phone]    BASELINE (I)ADL(s) (prior to admission):  [place]: [ ]Total  [x ] Moderate [ ]Dependent    Allergies    No Known Allergies    Intolerances    MEDICATIONS  (STANDING):  amLODIPine   Tablet 5 milliGRAM(s) Oral daily    MEDICATIONS  (PRN):  acetaminophen     Tablet .. 650 milliGRAM(s) Oral every 6 hours PRN Temp greater or equal to 38C (100.4F), Mild Pain (1 - 3)  aluminum hydroxide/magnesium hydroxide/simethicone Suspension 30 milliLiter(s) Oral every 4 hours PRN Dyspepsia  LORazepam   Injectable 1 milliGRAM(s) IV Push every 4 hours PRN Anxiety  melatonin 3 milliGRAM(s) Oral at bedtime PRN Insomnia  morphine  - Injectable 1 milliGRAM(s) IV Push every 2 hours PRN Moderate Pain (4 - 6)  ondansetron Injectable 4 milliGRAM(s) IV Push every 8 hours PRN Nausea and/or Vomiting    PRESENT SYMPTOMS: [ ]Unable to obtain due to poor mentation   Source if other than patient:  [ ]Family   [ ]Team     Pain: [x ] yes [ ] no  QOL impact -   Location -   head                 Aggravating factors -  Quality -  Radiation -  Timing-  Severity (0-10 scale): 4/10  Minimal acceptable level (0-10 scale):     PAIN AD Score:     http://geriatrictoolkit.[email or web address]/cog/painad.pdf (press ctrl +  left click to view)    Dyspnea:                           [ ]Mild [ ]Moderate [ ]Severe  Anxiety:                             [ ]Mild [ ]Moderate [ ]Severe  Fatigue:                             [ ]Mild [ ]Moderate [ ]Severe  Nausea:                             [ ]Mild [x ]Moderate [ ]Severe  Loss of appetite:              [ ]Mild [ x]Moderate [ ]Severe  Constipation:                    [ ]Mild [x ]Moderate [ ]Severe    Other Symptoms:  [ ]All other review of systems negative     Karnofsky Performance Score/Palliative Performance Status Version 2:        40-50 %    http://npcrc.org/files/news/palliative_performance_scale_ppsv2.pdf  PHYSICAL EXAM:  Vital Signs Last 24 Hrs  T(C): 36.8 (27 Jun 2022 11:44), Max: 37 (26 Jun 2022 21:04)  T(F): 98.2 (27 Jun 2022 11:44), Max: 98.6 (26 Jun 2022 21:04)  HR: 72 (27 Jun 2022 13:38) (59 - 78)  BP: 205/80 (27 Jun 2022 13:38) (139/68 - 215/103)  BP(mean): --  RR: 18 (27 Jun 2022 11:44) (13 - 18)  SpO2: 99% (27 Jun 2022 12:12) (97% - 100%) I&O's Summary  GENERAL:  [ ]Alert  [ ]Oriented x   [ ]Lethargic  [ ]Cachexia  [ ]Unarousable  [ ]Verbal  [ ]Non-Verbal  Behavioral:   [ ] Anxiety  [ ] Delirium [ ] Agitation [ ] Other  HEENT:  [ ]Normal   [ ]Dry mouth   [ ]ET Tube/Trach  [ ]Oral lesions  PULMONARY:   [ ]Clear [ ]Tachypnea  [ ]Audible excessive secretions   [ ]Rhonchi        [ ]Right [ ]Left [ ]Bilateral  [ ]Crackles        [ ]Right [ ]Left [ ]Bilateral  [ ]Wheezing     [ ]Right [ ]Left [ ]Bilateral  CARDIOVASCULAR:    [ ]Regular [ ]Irregular [ ]Tachy  [ ]Timmy [ ]Murmur [ ]Other  GASTROINTESTINAL:  [ ]Soft  [ ]Distended   [ ]+BS  [ ]Non tender [ ]Tender  [ ]PEG [ ]OGT/ NGT  Last BM: 6/27/22 GENITOURINARY:  [ x]Normal [ ] Incontinent   [ ]Oliguria/Anuria   [ ]Nielson  MUSCULOSKELETAL:   [ ]Normal   [x ]Weakness  [ ]Bed/Wheelchair bound [ ]Edema  NEUROLOGIC:   [x ]No focal deficits  [ ] Cognitive impairment  [ ] Dysphagia [ ]Dysarthria [ ] Paresis [ ]Other   SKIN:   [x ]Normal   [ ]Pressure ulcer(s)  [ ]Rash    CRITICAL CARE:  [ ] Shock Present  [ ]Septic [ ]Cardiogenic [ ]Neurologic [ ]Hypovolemic  [ ]  Vasopressors [ ]  Inotropes   [ ] Respiratory failure present [ ] mechanical ventilation [ ] non-invasive ventilatory support [ ] High flow  [ ] Acute  [ ] Chronic [ ] Hypoxic  [ ] Hypercarbic [ ] Other  [ ] Other organ failure     LABS:                        7.8    12.59 )-----------( 326      ( 25 Jun 2022 22:58 )             24.1   06-27    137  |  104  |  115<H>  ----------------------------<  80  5.7<H>   |  18<L>  |  17.30<H>    Ca    8.1<L>      27 Jun 2022 07:05  Mg     2.6     06-27    TPro  7.9  /  Alb  3.0<L>  /  TBili  0.3  /  DBili  x   /  AST  29  /  ALT  34  /  AlkPhos  85  06-25  PT/INR - ( 25 Jun 2022 22:58 )   PT: 12.9 sec;   INR: 1.07 ratio         PTT - ( 25 Jun 2022 22:58 )  PTT:35.9 sec    RADIOLOGY & ADDITIONAL STUDIES:   < from: CT Head No Cont (06.25.22 @ 23:57) >  FINDINGS:  Parenchymal volume loss isnoted with prominent ventricles and sulci.   Chronic microvascular ischemic changes are identified. No acute   territorial infarct is demonstrated.  There is no evidence of an acute hemorrhage or mass-effect in the   posterior fossa or in the supratentorial region.  Evaluation of the osseous structures with the appropriate window appears   unremarkable. Vascular calcifications are noted. Sequela of bilateral   lens surgery is seen. A small right mastoid air cell effusion is noted.   The visualized paranasal sinuses and left mastoid air cells are clear.  IMPRESSION:  No acute intracranial hemorrhage, territorial infarct or mass effect.  --- End of Report ---  < end of copied text >    < from: Xray Chest 1 View- PORTABLE-Urgent (Xray Chest 1 View- PORTABLE-Urgent .) (06.25.22 @ 23:12) >  IMPRESSION:  1. Predominantly diffuse alveolar right-sided infiltrates favoring   pneumonia. There is relative sparing of the left lung. Only linear   atelectasis can be seen laterally within the left lung.  2. Marginally enlarged cardiac silhouette with some central venous   engorgement.  3. Degenerative changes of the spine.  --- End of Report ---  < end of copied text >    PROTEIN CALORIE MALNUTRITION PRESENT: [x ] Yes [ ] No  [ ] PPSV2 < or = to 30% [ ] significant weight loss  [x ] poor nutritional intake [ ] catabolic state [ ] anasarca     Artificial Nutrition [ ]     REFERRALS:   [ ]Chaplaincy  [ ] Hospice  [ ]Child Life  [ ]Social Work  [ ]Case management [ ]Holistic Therapy     Goals of Care Document:

## 2022-06-27 NOTE — CONSULT NOTE ADULT - PROBLEM SELECTOR RECOMMENDATION 7
Patient with DNR/I on MOLST in chart.  Patient said she wants to live and is ok with continuing HD as she is not ready for hospice or to die.  We discussed that if her goals change, she can reconsider hospice. Patient to resume HD today.     Discussed with Dr. Osborn and message sent to Dr. Henry

## 2022-06-27 NOTE — CONSULT NOTE ADULT - PROBLEM SELECTOR RECOMMENDATION 2
patient said she did not have HD x 1 week  said she understands she will die without HD, but during our conversation said she wants to live and does not want hospice at this time and wants to continue with HD  HD planned for today

## 2022-06-27 NOTE — PATIENT PROFILE ADULT - FALL HARM RISK - HARM RISK INTERVENTIONS
Assistance with ambulation/Assistance OOB with selected safe patient handling equipment/Communicate Risk of Fall with Harm to all staff/Discuss with provider need for PT consult/Monitor for mental status changes/Monitor gait and stability/Provide patient with walking aids - walker, cane, crutches/Reinforce activity limits and safety measures with patient and family/Reorient to person, place and time as needed/Review medications for side effects contributing to fall risk/Tailored Fall Risk Interventions/Toileting schedule using arm’s reach rule for commode and bathroom/Use of alarms - bed, chair and/or voice tab/Visual Cue: Yellow wristband and red socks/Bed in lowest position, wheels locked, appropriate side rails in place/Call bell, personal items and telephone in reach/Instruct patient to call for assistance before getting out of bed or chair/Non-slip footwear when patient is out of bed/[place] to call system/Physically safe environment - no spills, clutter or unnecessary equipment/Purposeful Proactive Rounding/Room/bathroom lighting operational, light cord in reach

## 2022-06-27 NOTE — PROGRESS NOTE ADULT - SUBJECTIVE AND OBJECTIVE BOX
Patient wishes to resume HD treatments;   Discussed with the medical team, Dr. Henry   Will resume today. [facility] NEPHROLOGY SERVICES, [facility]  NEPHROLOGY AND HYPERTENSION  [address]  [address]  [place] [address]  [phone]    MD MATTHEW PATIÑO, MD NAYAN HOUSE, MD CARLITO PAYNE, MD RON BRANCH, MD COLBY RODRIGUEZ MD          Patient events noted  She wishes to resume HD treatments;   Noted MAP elevated; initially symptomatic   Now with sob and diaphoresis;       MEDICATIONS  (STANDING):  albuterol/ipratropium for Nebulization 3 milliLiter(s) Nebulizer every 6 hours  amLODIPine   Tablet 5 milliGRAM(s) Oral daily  hydrALAZINE Injectable 10 milliGRAM(s) IV Push once  Clonidine 0.1 mg given     MEDICATIONS  (PRN):  acetaminophen     Tablet .. 650 milliGRAM(s) Oral every 6 hours PRN Temp greater or equal to 38C (100.4F), Mild Pain (1 - 3)  aluminum hydroxide/magnesium hydroxide/simethicone Suspension 30 milliLiter(s) Oral every 4 hours PRN Dyspepsia  LORazepam   Injectable 1 milliGRAM(s) IV Push every 4 hours PRN Anxiety  melatonin 3 milliGRAM(s) Oral at bedtime PRN Insomnia  ondansetron Injectable 4 milliGRAM(s) IV Push every 8 hours PRN Nausea and/or Vomiting      PHYSICAL EXAM:      T(C): 36.8 (06-27-22 @ 11:44), Max: 37 (06-26-22 @ 21:04)  HR: 72 (06-27-22 @ 13:38) (59 - 78)  BP: 205/80 (06-27-22 @ 13:38) (139/68 - 215/103)  RR: 18 (06-27-22 @ 11:44) (13 - 18)  SpO2: 99% (06-27-22 @ 12:12) (97% - 100%)  Wt(kg): --  Lungs clear  Heart S1S2  Abd soft NT ND  Extremities:   tr edema                                    7.8    12.59 )-----------( 326      ( 25 Jun 2022 22:58 )             24.1     06-27    137  |  104  |  115<H>  ----------------------------<  80  5.7<H>   |  18<L>  |  17.30<H>    Ca    8.1<L>      27 Jun 2022 07:05  Mg     2.6     06-27    TPro  7.9  /  Alb  3.0<L>  /  TBili  0.3  /  DBili  x   /  AST  29  /  ALT  34  /  AlkPhos  85  06-25      LIVER FUNCTIONS - ( 25 Jun 2022 22:58 )  Alb: 3.0 g/dL / Pro: 7.9 gm/dL / ALK PHOS: 85 U/L / ALT: 34 U/L / AST: 29 U/L / GGT: x           Creatinine Trend: 17.30<--, 16.80<--, 16.60<--    Assessment   ESRD; non adherent for 1-2 weeks, initially want hospice, now requesting to resume HD.   Accelerated HTN; sob and diaphoresis, r/o ACS;     Plan  Clonidine 0.1; IV hydralazine; Coreg;   Cardiac enzymes, EKG;   HD monitored setting; will arrange for daily for 3 days as tolerated.      Harry Osborn MD

## 2022-06-28 DIAGNOSIS — R11.0 NAUSEA: ICD-10-CM

## 2022-06-28 LAB
ANION GAP SERPL CALC-SCNC: 10 MMOL/L — SIGNIFICANT CHANGE UP (ref 5–17)
BUN SERPL-MCNC: 25 MG/DL — HIGH (ref 7–23)
CALCIUM SERPL-MCNC: 8.7 MG/DL — SIGNIFICANT CHANGE UP (ref 8.5–10.1)
CHLORIDE SERPL-SCNC: 98 MMOL/L — SIGNIFICANT CHANGE UP (ref 96–108)
CO2 SERPL-SCNC: 28 MMOL/L — SIGNIFICANT CHANGE UP (ref 22–31)
CREAT SERPL-MCNC: 5.29 MG/DL — HIGH (ref 0.5–1.3)
EGFR: 8 ML/MIN/1.73M2 — LOW
GLUCOSE SERPL-MCNC: 79 MG/DL — SIGNIFICANT CHANGE UP (ref 70–99)
HAV IGM SER-ACNC: SIGNIFICANT CHANGE UP
HBV CORE IGM SER-ACNC: SIGNIFICANT CHANGE UP
HBV SURFACE AG SER-ACNC: SIGNIFICANT CHANGE UP
HCT VFR BLD CALC: 19.6 % — CRITICAL LOW (ref 34.5–45)
HCT VFR BLD CALC: 24.5 % — LOW (ref 34.5–45)
HCV AB S/CO SERPL IA: 0.17 S/CO — SIGNIFICANT CHANGE UP (ref 0–0.99)
HCV AB SERPL-IMP: SIGNIFICANT CHANGE UP
HGB BLD-MCNC: 6.7 G/DL — CRITICAL LOW (ref 11.5–15.5)
HGB BLD-MCNC: 8 G/DL — LOW (ref 11.5–15.5)
MCHC RBC-ENTMCNC: 27.5 PG — SIGNIFICANT CHANGE UP (ref 27–34)
MCHC RBC-ENTMCNC: 28.5 PG — SIGNIFICANT CHANGE UP (ref 27–34)
MCHC RBC-ENTMCNC: 32.7 G/DL — SIGNIFICANT CHANGE UP (ref 32–36)
MCHC RBC-ENTMCNC: 34.2 G/DL — SIGNIFICANT CHANGE UP (ref 32–36)
MCV RBC AUTO: 83.4 FL — SIGNIFICANT CHANGE UP (ref 80–100)
MCV RBC AUTO: 84.2 FL — SIGNIFICANT CHANGE UP (ref 80–100)
NRBC # BLD: 0 /100 WBCS — SIGNIFICANT CHANGE UP (ref 0–0)
PLATELET # BLD AUTO: 217 K/UL — SIGNIFICANT CHANGE UP (ref 150–400)
PLATELET # BLD AUTO: 224 K/UL — SIGNIFICANT CHANGE UP (ref 150–400)
POTASSIUM SERPL-MCNC: 3.3 MMOL/L — LOW (ref 3.5–5.3)
POTASSIUM SERPL-SCNC: 3.3 MMOL/L — LOW (ref 3.5–5.3)
RBC # BLD: 2.35 M/UL — LOW (ref 3.8–5.2)
RBC # BLD: 2.91 M/UL — LOW (ref 3.8–5.2)
RBC # FLD: 15.5 % — HIGH (ref 10.3–14.5)
RBC # FLD: 15.8 % — HIGH (ref 10.3–14.5)
SODIUM SERPL-SCNC: 136 MMOL/L — SIGNIFICANT CHANGE UP (ref 135–145)
TROPONIN I, HIGH SENSITIVITY RESULT: 116.9 NG/L — HIGH
WBC # BLD: 7.74 K/UL — SIGNIFICANT CHANGE UP (ref 3.8–10.5)
WBC # BLD: 7.83 K/UL — SIGNIFICANT CHANGE UP (ref 3.8–10.5)
WBC # FLD AUTO: 7.74 K/UL — SIGNIFICANT CHANGE UP (ref 3.8–10.5)
WBC # FLD AUTO: 7.83 K/UL — SIGNIFICANT CHANGE UP (ref 3.8–10.5)

## 2022-06-28 PROCEDURE — 99497 ADVNCD CARE PLAN 30 MIN: CPT

## 2022-06-28 PROCEDURE — 99233 SBSQ HOSP IP/OBS HIGH 50: CPT

## 2022-06-28 RX ORDER — HYDRALAZINE HCL 50 MG
10 TABLET ORAL THREE TIMES A DAY
Refills: 0 | Status: DISCONTINUED | OUTPATIENT
Start: 2022-06-28 | End: 2022-06-29

## 2022-06-28 RX ORDER — CARVEDILOL PHOSPHATE 80 MG/1
25 CAPSULE, EXTENDED RELEASE ORAL EVERY 12 HOURS
Refills: 0 | Status: DISCONTINUED | OUTPATIENT
Start: 2022-06-28 | End: 2022-07-03

## 2022-06-28 RX ORDER — ACETAMINOPHEN 500 MG
1000 TABLET ORAL ONCE
Refills: 0 | Status: DISCONTINUED | OUTPATIENT
Start: 2022-06-28 | End: 2022-06-30

## 2022-06-28 RX ORDER — CARVEDILOL PHOSPHATE 80 MG/1
12.5 CAPSULE, EXTENDED RELEASE ORAL EVERY 12 HOURS
Refills: 0 | Status: DISCONTINUED | OUTPATIENT
Start: 2022-06-28 | End: 2022-06-28

## 2022-06-28 RX ORDER — NIFEDIPINE 30 MG
60 TABLET, EXTENDED RELEASE 24 HR ORAL DAILY
Refills: 0 | Status: DISCONTINUED | OUTPATIENT
Start: 2022-06-29 | End: 2022-07-03

## 2022-06-28 RX ORDER — ASPIRIN/CALCIUM CARB/MAGNESIUM 324 MG
81 TABLET ORAL DAILY
Refills: 0 | Status: DISCONTINUED | OUTPATIENT
Start: 2022-06-28 | End: 2022-07-03

## 2022-06-28 RX ORDER — PANTOPRAZOLE SODIUM 20 MG/1
40 TABLET, DELAYED RELEASE ORAL
Refills: 0 | Status: DISCONTINUED | OUTPATIENT
Start: 2022-06-28 | End: 2022-07-03

## 2022-06-28 RX ORDER — SENNA PLUS 8.6 MG/1
2 TABLET ORAL AT BEDTIME
Refills: 0 | Status: DISCONTINUED | OUTPATIENT
Start: 2022-06-28 | End: 2022-07-03

## 2022-06-28 RX ORDER — ACETAMINOPHEN 500 MG
1000 TABLET ORAL ONCE
Refills: 0 | Status: COMPLETED | OUTPATIENT
Start: 2022-06-28 | End: 2022-06-28

## 2022-06-28 RX ORDER — AMLODIPINE BESYLATE 2.5 MG/1
5 TABLET ORAL DAILY
Refills: 0 | Status: DISCONTINUED | OUTPATIENT
Start: 2022-06-28 | End: 2022-06-28

## 2022-06-28 RX ORDER — ATORVASTATIN CALCIUM 80 MG/1
80 TABLET, FILM COATED ORAL AT BEDTIME
Refills: 0 | Status: DISCONTINUED | OUTPATIENT
Start: 2022-06-28 | End: 2022-07-03

## 2022-06-28 RX ADMIN — Medication 3 MILLILITER(S): at 11:25

## 2022-06-28 RX ADMIN — ATORVASTATIN CALCIUM 80 MILLIGRAM(S): 80 TABLET, FILM COATED ORAL at 22:37

## 2022-06-28 RX ADMIN — Medication 650 MILLIGRAM(S): at 07:48

## 2022-06-28 RX ADMIN — Medication 650 MILLIGRAM(S): at 08:25

## 2022-06-28 RX ADMIN — Medication 650 MILLIGRAM(S): at 17:03

## 2022-06-28 RX ADMIN — CARVEDILOL PHOSPHATE 12.5 MILLIGRAM(S): 80 CAPSULE, EXTENDED RELEASE ORAL at 17:04

## 2022-06-28 RX ADMIN — ONDANSETRON 4 MILLIGRAM(S): 8 TABLET, FILM COATED ORAL at 08:20

## 2022-06-28 RX ADMIN — Medication 0.2 MILLIGRAM(S): at 17:57

## 2022-06-28 RX ADMIN — Medication 10 MILLIGRAM(S): at 22:37

## 2022-06-28 RX ADMIN — Medication 3 MILLILITER(S): at 05:36

## 2022-06-28 RX ADMIN — Medication 3 MILLILITER(S): at 00:44

## 2022-06-28 RX ADMIN — Medication 1 MILLIGRAM(S): at 08:22

## 2022-06-28 RX ADMIN — AMLODIPINE BESYLATE 5 MILLIGRAM(S): 2.5 TABLET ORAL at 17:04

## 2022-06-28 RX ADMIN — ONDANSETRON 4 MILLIGRAM(S): 8 TABLET, FILM COATED ORAL at 16:33

## 2022-06-28 RX ADMIN — Medication 5 MILLIGRAM(S): at 16:30

## 2022-06-28 RX ADMIN — Medication 0.5 MILLIGRAM(S): at 01:04

## 2022-06-28 RX ADMIN — Medication 650 MILLIGRAM(S): at 16:29

## 2022-06-28 NOTE — PROGRESS NOTE ADULT - SUBJECTIVE AND OBJECTIVE BOX
HPI:  This is a 76 yo F with Hx of HTN, glaucoma, depression and ESRD (TTS), presenting to the ED with daughter due to sob and collapse at home. Patient and daughter wish for her to be on hospice care. She has intentionally skipped HD x 2w and as a result has gotten more sob 80% sPO2 on RA requiring cPAP in ED. She was recently admitted to [facility] for the same reason. Pt c/o of SOB and collapsed at home. one episode of nbnb vomiting in ED. Denies LOC, vertigo, abd pain, dizziness, leg or calf swelling. Pt is anuric.  She is DNR/DNI per daughter but would take noninvasive comfort measures. labs significant for wbx 12.5, hgb 7.8, k 6.2, trop 55, pBNP 99k, creat 16 (26 Jun 2022 06:10)      SUBJECTIVE & OBJECTIVE:   Pt seen and examined at bedside.   no overnight events.   now off BIPAP, tolerating NC, improved SOB after HD           PHYSICAL EXAM:  Vital Signs Last 24 Hrs  T(C): 37.2 (28 Jun 2022 16:03), Max: 37.6 (28 Jun 2022 11:05)  T(F): 99 (28 Jun 2022 16:03), Max: 99.7 (28 Jun 2022 11:05)  HR: 86 (28 Jun 2022 18:15) (69 - 95)  BP: 194/110 (28 Jun 2022 18:15) (90/50 - 224/110)  BP(mean): --  RR: 18 (28 Jun 2022 16:24) (18 - 20)  SpO2: 100% (28 Jun 2022 18:14) (95% - 100%)        GENERAL: obese, NAD, speaking in full sentences, not using accessory muscles , on NC   HEAD:  Atraumatic, Normocephalic  EYES: EOMI, PERRLA, conjunctiva and sclera clear  ENMT: Moist mucous membranes  NECK: Supple, No JVD  NERVOUS SYSTEM:  Alert & Oriented X3, no focal neuro deficits   CHEST/LUNG: good b/l air entry , bibasilar crackles   HEART: Regular rate and rhythm; No murmurs, rubs, or gallops  ABDOMEN: Soft, Nontender, Nondistended; Bowel sounds present  EXTREMITIES:  2+ Peripheral Pulses b/l, No clubbing, cyanosis, calf tenderness or edema b/l    MEDICATIONS  (STANDING):  albuterol/ipratropium for Nebulization 3 milliLiter(s) Nebulizer every 6 hours  amLODIPine   Tablet 5 milliGRAM(s) Oral daily  carvedilol 6.25 milliGRAM(s) Oral every 12 hours  heparin   Injectable 5000 Unit(s) SubCutaneous every 12 hours    MEDICATIONS  (PRN):  acetaminophen     Tablet .. 650 milliGRAM(s) Oral every 6 hours PRN Temp greater or equal to 38C (100.4F), Mild Pain (1 - 3)  aluminum hydroxide/magnesium hydroxide/simethicone Suspension 30 milliLiter(s) Oral every 4 hours PRN Dyspepsia  hydrALAZINE Injectable 5 milliGRAM(s) IV Push every 6 hours PRN if SBP >170 or DBP >100  LORazepam   Injectable 1 milliGRAM(s) IV Push every 4 hours PRN Anxiety  melatonin 3 milliGRAM(s) Oral at bedtime PRN Insomnia  ondansetron Injectable 4 milliGRAM(s) IV Push every 8 hours PRN Nausea and/or Vomiting      LABS:                                   8.0    7.74  )-----------( 224      ( 28 Jun 2022 08:05 )             24.5   06-28    136  |  98  |  25<H>  ----------------------------<  79  3.3<L>   |  28  |  5.29<H>    Ca    8.7      28 Jun 2022 17:10  Mg     2.6     06-27         PTT - ( 25 Jun 2022 22:58 )  PTT:35.9 sec    Magnesium, Serum: 2.6 mg/dL (06-27 @ 07:05)    CAPILLARY BLOOD GLUCOSE      POCT Blood Glucose.: 125 mg/dL (27 Jun 2022 14:16)            RECENT CULTURES:      RADIOLOGY & ADDITIONAL TESTS:          Imaging Personally Reviewed:  [ ] YES  [ ] NO    Consultant(s) Notes Reviewed:  [x ] YES  [ ] NO    Care Discussed with Consultants/Other Providers [x ] YES  [ ] NO  Care discussed in detail with patient and daughter at bedside.  All questions and concerns addressed

## 2022-06-28 NOTE — PROGRESS NOTE ADULT - CONVERSATION DETAILS
Patient said she would like to continue HD, but agrees with DNR/I and no feeding tube.  New MOLST completed indicating DNR/I/No feeding tube.

## 2022-06-28 NOTE — PROGRESS NOTE ADULT - SUBJECTIVE AND OBJECTIVE BOX
SUBJECTIVE AND OBJECTIVE: Patient resting in bed c/o headache  INTERVAL HPI/OVERNIGHT EVENTS: RRT yesterday for HTN and shortness of breath was placed on NIV    DNR on chart: yes   Allergies    No Known Allergies    Intolerances    MEDICATIONS  (STANDING):  albuterol/ipratropium for Nebulization 3 milliLiter(s) Nebulizer every 6 hours  carvedilol 6.25 milliGRAM(s) Oral every 12 hours  heparin   Injectable 5000 Unit(s) SubCutaneous every 12 hours    MEDICATIONS  (PRN):  acetaminophen     Tablet .. 650 milliGRAM(s) Oral every 6 hours PRN Temp greater or equal to 38C (100.4F), Mild Pain (1 - 3)  aluminum hydroxide/magnesium hydroxide/simethicone Suspension 30 milliLiter(s) Oral every 4 hours PRN Dyspepsia  hydrALAZINE Injectable 5 milliGRAM(s) IV Push every 6 hours PRN if SBP >170 or DBP >100  LORazepam   Injectable 1 milliGRAM(s) IV Push every 4 hours PRN Anxiety  melatonin 3 milliGRAM(s) Oral at bedtime PRN Insomnia  ondansetron Injectable 4 milliGRAM(s) IV Push every 8 hours PRN Nausea and/or Vomiting      ITEMS UNCHECKED ARE NOT PRESENT    PRESENT SYMPTOMS: [ ]Unable to obtain due to poor mentation   Source if other than patient:  [ ]Family   [ ]Team     Pain:  [ x]yes [ ]no  QOL impact -   Location - head                   Aggravating factors -  Quality -  Radiation -  Timing-  Severity (0-10 scale):  Minimal acceptable level (0-10 scale):     Dyspnea:                           [ ]Mild [ ]Moderate [ ]Severe  Anxiety:                             [ ]Mild [ ]Moderate [ ]Severe  Fatigue:                             [x ]Mild [ ]Moderate [ ]Severe  Nausea:                             [x ]Mild [ ]Moderate [ ]Severe  Loss of appetite:              [ ]Mild [x ]Moderate [ ]Severe  Constipation:                    [ ]Mild [ ]Moderate [ ]Severe    PAIN AD Score:	  http://geriatrictoolkit.[employer].edu/cog/painad.pdf (Ctrl + left click to view)    Other Symptoms:  [x ]All other review of systems negative     Palliative Performance Status Version 2:         40%      http://npcrc.org/files/news/palliative_performance_scale_ppsv2.pdf  PHYSICAL EXAM:  Vital Signs Last 24 Hrs  T(C): 37.1 (28 Jun 2022 11:20), Max: 37.6 (28 Jun 2022 11:05)  T(F): 98.7 (28 Jun 2022 11:20), Max: 99.7 (28 Jun 2022 11:05)  HR: 83 (28 Jun 2022 11:26) (69 - 92)  BP: 133/94 (28 Jun 2022 11:20) (90/50 - 238/98)  BP(mean): --  RR: 18 (28 Jun 2022 11:20) (18 - 24)  SpO2: 97% (28 Jun 2022 11:26) (95% - 100%) I&O's Summary    27 Jun 2022 07:01  -  28 Jun 2022 07:00  --------------------------------------------------------  IN: 1190 mL / OUT: 1500 mL / NET: -310 mL       GENERAL:  [ x]Alert  [ ]Oriented x   [ ]Lethargic  [ ]Cachexia  [ ]Unarousable  [x ]Verbal  [ ]Non-Verbal  Behavioral:   [ ]Anxiety  [ ]Delirium [ ]Agitation [ ]Other  HEENT:  [ ]Normal   [ x]Dry mouth   [ ]ET Tube/Trach  [ ]Oral lesions  PULMONARY:   [ ]Clear [ ]Tachypnea  [ ]Audible excessive secretions   [ ]Rhonchi        [ ]Right [ ]Left [ ]Bilateral  [ x]Crackles        [ x]Right [ ]Left [ ]Bilateral  [ ]Wheezing     [ ]Right [ ]Left [ ]Bilateral  [ ]Diminished BS [ ] Right [ ]Left [ ]Bilateral  CARDIOVASCULAR:    [ x]Regular [ ]Irregular [ ]Tachy  [ ]Timmy [ ]Murmur [ ]Other  GASTROINTESTINAL:  [x ]Soft  [ ]Distended   [ ]+BS  [x ]Non tender [ ]Tender  [ ]PEG [ ]OGT/ NGT   Last BM:  6/27/22  GENITOURINARY:  [x ]Normal [ ]Incontinent   [ ]Oliguria/Anuria   [ ]Nielson  MUSCULOSKELETAL:   [ ]Normal   [x ]Weakness  [ ]Bed/Wheelchair bound [ ]Edema  NEUROLOGIC:   [x ]No focal deficits  [ ] Cognitive impairment  [ ] Dysphagia [ ]Dysarthria [ ] Paresis [ ]Other   SKIN:   [ x]Normal  [ ]Rash   [ ]Pressure ulcer(s) [ ]y [ ]n present on admission    CRITICAL CARE:  [ ]Shock Present  [ ]Septic [ ]Cardiogenic [ ]Neurologic [ ]Hypovolemic  [ ]Vasopressors [ ]Inotropes  [ ]Respiratory failure present [ ]Mechanical Ventilation [ ]Non-invasive ventilatory support [ ]High-Flow  [ ]Acute  [ ]Chronic [ ]Hypoxic  [ ]Hypercarbic [ ]Other  [ ]Other organ failure     LABS:                        8.0    7.74  )-----------( 224      ( 28 Jun 2022 08:05 )             24.5   06-27    137  |  104  |  115<H>  ----------------------------<  80  5.7<H>   |  18<L>  |  17.30<H>    Ca    8.1<L>      27 Jun 2022 07:05  Mg     2.6     06-27    RADIOLOGY & ADDITIONAL STUDIES: none new    Protein Calorie Malnutrition Present: [ ]mild [x ]moderate [ ]severe [ ]underweight [ ]morbid obesity  https://www.andeal.org/vault/2440/web/files/ONC/Table_Clinical%20Characteristics%20to%20Document%20Malnutrition-White%20JV%20et%20al%202012.pdf    Height (cm): 154.9 (06-27-22 @ 00:36)  Weight (kg): 77.2 (06-27-22 @ 00:36)  BMI (kg/m2): 32.2 (06-27-22 @ 00:36)    [x ]PPSV2 < or = 30%  [ ]significant weight loss [ ]poor nutritional intake [ ]anasarca    [ ]Artificial Nutrition    REFERRALS:   [ ]Chaplaincy  [ ]Hospice  [ ]Child Life  [ ]Social Work  [ ]Case management [ ]Holistic Therapy     Goals of Care Document:

## 2022-06-28 NOTE — PROGRESS NOTE ADULT - SUBJECTIVE AND OBJECTIVE BOX
[facility] NEPHROLOGY SERVICES, [facility]  NEPHROLOGY AND HYPERTENSION  [address]  [address]  [place] [address]  [phone]    MD MATTHEW PATIÑO MD ANDREY GONCHARUK, MD MADHU KORRAPATI, MD YELENA ROSENBERG, MD BINNY KOSHY, MD CHRISTOPHER CAPUTO, MD COLBY RODRIGUEZ MD          Patient events noted  no acute distress    MEDICATIONS  (STANDING):  albuterol/ipratropium for Nebulization 3 milliLiter(s) Nebulizer every 6 hours  aspirin enteric coated 81 milliGRAM(s) Oral daily  atorvastatin 80 milliGRAM(s) Oral at bedtime  carvedilol 25 milliGRAM(s) Oral every 12 hours  cloNIDine 0.2 milliGRAM(s) Oral every 12 hours  heparin   Injectable 5000 Unit(s) SubCutaneous every 12 hours  hydrALAZINE 10 milliGRAM(s) Oral three times a day  pantoprazole    Tablet 40 milliGRAM(s) Oral before breakfast  senna 2 Tablet(s) Oral at bedtime    MEDICATIONS  (PRN):  acetaminophen     Tablet .. 650 milliGRAM(s) Oral every 6 hours PRN Temp greater or equal to 38C (100.4F), Mild Pain (1 - 3)  acetaminophen   IVPB .. 1000 milliGRAM(s) IV Intermittent once PRN pain  aluminum hydroxide/magnesium hydroxide/simethicone Suspension 30 milliLiter(s) Oral every 4 hours PRN Dyspepsia  hydrALAZINE Injectable 5 milliGRAM(s) IV Push every 6 hours PRN if SBP >170 or DBP >100  LORazepam   Injectable 1 milliGRAM(s) IV Push every 4 hours PRN Anxiety  melatonin 3 milliGRAM(s) Oral at bedtime PRN Insomnia  ondansetron Injectable 4 milliGRAM(s) IV Push every 8 hours PRN Nausea and/or Vomiting      06-27-22 @ 07:01  -  06-28-22 @ 07:00  --------------------------------------------------------  IN: 1190 mL / OUT: 1500 mL / NET: -310 mL    06-28-22 @ 07:01  -  06-28-22 @ 23:04  --------------------------------------------------------  IN: 240 mL / OUT: 2500 mL / NET: -2260 mL      PHYSICAL EXAM:      T(C): 37.2 (06-28-22 @ 16:03), Max: 37.6 (06-28-22 @ 11:05)  HR: 76 (06-28-22 @ 21:56) (76 - 95)  BP: 194/89 (06-28-22 @ 21:29) (90/50 - 224/110)  RR: 18 (06-28-22 @ 21:29) (18 - 20)  SpO2: 100% (06-28-22 @ 21:56) (97% - 100%)  Wt(kg): --  Lungs clear  Heart S1S2  Abd soft NT ND  Extremities:   tr edema                                    8.0    7.74  )-----------( 224      ( 28 Jun 2022 08:05 )             24.5     06-28    136  |  98  |  25<H>  ----------------------------<  79  3.3<L>   |  28  |  5.29<H>    Ca    8.7      28 Jun 2022 17:10  Mg     2.6     06-27          Creatinine Trend: 5.29<--, 17.30<--, 16.80<--, 16.60<--    Assessment   ESRD; non adherent for 1-2 weeks, initially want hospice, now requesting to resume HD.   Accelerated HTN; sob and diaphoresis, r/o ACS;     Plan  BP medication adjustments  Use RUE for BP readings  Daily HD  Will follow       Harry Osborn MD

## 2022-06-29 LAB
ANION GAP SERPL CALC-SCNC: 12 MMOL/L — SIGNIFICANT CHANGE UP (ref 5–17)
BUN SERPL-MCNC: 30 MG/DL — HIGH (ref 7–23)
CALCIUM SERPL-MCNC: 8.7 MG/DL — SIGNIFICANT CHANGE UP (ref 8.5–10.1)
CHLORIDE SERPL-SCNC: 99 MMOL/L — SIGNIFICANT CHANGE UP (ref 96–108)
CO2 SERPL-SCNC: 27 MMOL/L — SIGNIFICANT CHANGE UP (ref 22–31)
CREAT SERPL-MCNC: 6.4 MG/DL — HIGH (ref 0.5–1.3)
EGFR: 6 ML/MIN/1.73M2 — LOW
FERRITIN SERPL-MCNC: 1237 NG/ML — HIGH (ref 15–150)
GLUCOSE SERPL-MCNC: 79 MG/DL — SIGNIFICANT CHANGE UP (ref 70–99)
HCT VFR BLD CALC: 21.5 % — LOW (ref 34.5–45)
HGB BLD-MCNC: 7.1 G/DL — LOW (ref 11.5–15.5)
MAGNESIUM SERPL-MCNC: 2.2 MG/DL — SIGNIFICANT CHANGE UP (ref 1.6–2.6)
MCHC RBC-ENTMCNC: 28.1 PG — SIGNIFICANT CHANGE UP (ref 27–34)
MCHC RBC-ENTMCNC: 33 G/DL — SIGNIFICANT CHANGE UP (ref 32–36)
MCV RBC AUTO: 85 FL — SIGNIFICANT CHANGE UP (ref 80–100)
NRBC # BLD: 0 /100 WBCS — SIGNIFICANT CHANGE UP (ref 0–0)
PHOSPHATE SERPL-MCNC: 5 MG/DL — HIGH (ref 2.5–4.5)
PLATELET # BLD AUTO: 198 K/UL — SIGNIFICANT CHANGE UP (ref 150–400)
POTASSIUM SERPL-MCNC: 3.7 MMOL/L — SIGNIFICANT CHANGE UP (ref 3.5–5.3)
POTASSIUM SERPL-SCNC: 3.7 MMOL/L — SIGNIFICANT CHANGE UP (ref 3.5–5.3)
RBC # BLD: 2.53 M/UL — LOW (ref 3.8–5.2)
RBC # FLD: 16 % — HIGH (ref 10.3–14.5)
SODIUM SERPL-SCNC: 138 MMOL/L — SIGNIFICANT CHANGE UP (ref 135–145)
VIT B12 SERPL-MCNC: 418 PG/ML — SIGNIFICANT CHANGE UP (ref 232–1245)
WBC # BLD: 5.98 K/UL — SIGNIFICANT CHANGE UP (ref 3.8–10.5)
WBC # FLD AUTO: 5.98 K/UL — SIGNIFICANT CHANGE UP (ref 3.8–10.5)

## 2022-06-29 PROCEDURE — 71250 CT THORAX DX C-: CPT | Mod: 26

## 2022-06-29 PROCEDURE — 99497 ADVNCD CARE PLAN 30 MIN: CPT

## 2022-06-29 PROCEDURE — 93010 ELECTROCARDIOGRAM REPORT: CPT

## 2022-06-29 PROCEDURE — 99233 SBSQ HOSP IP/OBS HIGH 50: CPT

## 2022-06-29 RX ORDER — HYDRALAZINE HCL 50 MG
25 TABLET ORAL EVERY 8 HOURS
Refills: 0 | Status: DISCONTINUED | OUTPATIENT
Start: 2022-06-29 | End: 2022-06-29

## 2022-06-29 RX ORDER — HYDRALAZINE HCL 50 MG
10 TABLET ORAL EVERY 8 HOURS
Refills: 0 | Status: DISCONTINUED | OUTPATIENT
Start: 2022-06-29 | End: 2022-07-01

## 2022-06-29 RX ORDER — CALCIUM GLUCONATE 100 MG/ML
1 VIAL (ML) INTRAVENOUS ONCE
Refills: 0 | Status: COMPLETED | OUTPATIENT
Start: 2022-06-29 | End: 2022-06-29

## 2022-06-29 RX ORDER — PREGABALIN 225 MG/1
1000 CAPSULE ORAL DAILY
Refills: 0 | Status: DISCONTINUED | OUTPATIENT
Start: 2022-06-29 | End: 2022-07-03

## 2022-06-29 RX ORDER — METOCLOPRAMIDE HCL 10 MG
5 TABLET ORAL EVERY 8 HOURS
Refills: 0 | Status: DISCONTINUED | OUTPATIENT
Start: 2022-06-29 | End: 2022-07-03

## 2022-06-29 RX ORDER — CALCIUM ACETATE 667 MG
667 TABLET ORAL
Refills: 0 | Status: DISCONTINUED | OUTPATIENT
Start: 2022-06-29 | End: 2022-07-03

## 2022-06-29 RX ORDER — CHLORHEXIDINE GLUCONATE 213 G/1000ML
1 SOLUTION TOPICAL DAILY
Refills: 0 | Status: DISCONTINUED | OUTPATIENT
Start: 2022-06-29 | End: 2022-07-03

## 2022-06-29 RX ADMIN — Medication 0.2 MILLIGRAM(S): at 17:25

## 2022-06-29 RX ADMIN — HEPARIN SODIUM 5000 UNIT(S): 5000 INJECTION INTRAVENOUS; SUBCUTANEOUS at 17:27

## 2022-06-29 RX ADMIN — Medication 650 MILLIGRAM(S): at 13:06

## 2022-06-29 RX ADMIN — Medication 5 MILLIGRAM(S): at 15:19

## 2022-06-29 RX ADMIN — Medication 10 MILLIGRAM(S): at 05:07

## 2022-06-29 RX ADMIN — CARVEDILOL PHOSPHATE 25 MILLIGRAM(S): 80 CAPSULE, EXTENDED RELEASE ORAL at 17:25

## 2022-06-29 RX ADMIN — Medication 0.2 MILLIGRAM(S): at 05:07

## 2022-06-29 RX ADMIN — Medication 60 MILLIGRAM(S): at 05:06

## 2022-06-29 RX ADMIN — Medication 100 GRAM(S): at 19:54

## 2022-06-29 RX ADMIN — Medication 10 MILLIGRAM(S): at 21:41

## 2022-06-29 RX ADMIN — Medication 5 MILLIGRAM(S): at 00:01

## 2022-06-29 RX ADMIN — Medication 650 MILLIGRAM(S): at 15:06

## 2022-06-29 RX ADMIN — Medication 3 MILLILITER(S): at 00:04

## 2022-06-29 RX ADMIN — CARVEDILOL PHOSPHATE 25 MILLIGRAM(S): 80 CAPSULE, EXTENDED RELEASE ORAL at 05:07

## 2022-06-29 RX ADMIN — Medication 3 MILLILITER(S): at 05:15

## 2022-06-29 RX ADMIN — HEPARIN SODIUM 5000 UNIT(S): 5000 INJECTION INTRAVENOUS; SUBCUTANEOUS at 05:08

## 2022-06-29 RX ADMIN — CHLORHEXIDINE GLUCONATE 1 APPLICATION(S): 213 SOLUTION TOPICAL at 17:26

## 2022-06-29 RX ADMIN — Medication 3 MILLILITER(S): at 17:48

## 2022-06-29 RX ADMIN — PREGABALIN 1000 MICROGRAM(S): 225 CAPSULE ORAL at 15:20

## 2022-06-29 RX ADMIN — ATORVASTATIN CALCIUM 80 MILLIGRAM(S): 80 TABLET, FILM COATED ORAL at 21:41

## 2022-06-29 RX ADMIN — PANTOPRAZOLE SODIUM 40 MILLIGRAM(S): 20 TABLET, DELAYED RELEASE ORAL at 05:06

## 2022-06-29 RX ADMIN — Medication 81 MILLIGRAM(S): at 15:19

## 2022-06-29 NOTE — PROGRESS NOTE ADULT - SUBJECTIVE AND OBJECTIVE BOX
[facility] NEPHROLOGY SERVICES, [facility]  NEPHROLOGY AND HYPERTENSION  [address]  [address]  [place] [address]  [phone]    MD MATTHEW PATIÑO MD ANDREY GONCHARUK, MD MADHU KORRAPATI, MD YELENA ROSENBERG, MD BINNY KOSHY, MD CHRISTOPHER CAPUTO, MD EDWARD BOVER, MD          Patient events noted    MEDICATIONS  (STANDING):  albuterol/ipratropium for Nebulization 3 milliLiter(s) Nebulizer every 6 hours  aspirin enteric coated 81 milliGRAM(s) Oral daily  atorvastatin 80 milliGRAM(s) Oral at bedtime  calcium acetate 667 milliGRAM(s) Oral three times a day with meals  carvedilol 25 milliGRAM(s) Oral every 12 hours  chlorhexidine 2% Cloths 1 Application(s) Topical daily  cloNIDine 0.2 milliGRAM(s) Oral every 12 hours  cyanocobalamin 1000 MICROGram(s) Oral daily  heparin   Injectable 5000 Unit(s) SubCutaneous every 12 hours  hydrALAZINE 10 milliGRAM(s) Oral every 8 hours  NIFEdipine XL 60 milliGRAM(s) Oral daily  pantoprazole    Tablet 40 milliGRAM(s) Oral before breakfast  senna 2 Tablet(s) Oral at bedtime    MEDICATIONS  (PRN):  acetaminophen     Tablet .. 650 milliGRAM(s) Oral every 6 hours PRN Temp greater or equal to 38C (100.4F), Mild Pain (1 - 3)  acetaminophen   IVPB .. 1000 milliGRAM(s) IV Intermittent once PRN pain  aluminum hydroxide/magnesium hydroxide/simethicone Suspension 30 milliLiter(s) Oral every 4 hours PRN Dyspepsia  hydrALAZINE Injectable 5 milliGRAM(s) IV Push every 6 hours PRN if SBP >170 or DBP >100  LORazepam   Injectable 1 milliGRAM(s) IV Push every 4 hours PRN Anxiety  melatonin 3 milliGRAM(s) Oral at bedtime PRN Insomnia  metoclopramide Injectable 5 milliGRAM(s) IV Push every 8 hours PRN nausea/vomiting      06-28-22 @ 07:01  -  06-29-22 @ 07:00  --------------------------------------------------------  IN: 240 mL / OUT: 2500 mL / NET: -2260 mL    06-29-22 @ 07:01  -  06-29-22 @ 22:17  --------------------------------------------------------  IN: 0 mL / OUT: 3000 mL / NET: -3000 mL      PHYSICAL EXAM:      T(C): 37 (06-29-22 @ 19:10), Max: 37.4 (06-28-22 @ 23:46)  HR: 66 (06-29-22 @ 21:38) (66 - 94)  BP: 142/65 (06-29-22 @ 21:38) (111/78 - 209/82)  RR: 17 (06-29-22 @ 19:10) (16 - 20)  SpO2: 100% (06-29-22 @ 21:15) (94% - 100%)  Wt(kg): --  Lungs clear  Heart S1S2  Abd soft NT ND  Extremities:   tr edema                                    7.1    5.98  )-----------( 198      ( 29 Jun 2022 07:10 )             21.5     06-29    138  |  99  |  30<H>  ----------------------------<  79  3.7   |  27  |  6.40<H>    Ca    8.7      29 Jun 2022 07:10  Phos  5.0     06-29  Mg     2.2     06-29          Creatinine Trend: 6.40<--, 5.29<--, 17.30<--, 16.80<--, 16.60<--      Assessment   ESRD: maintenance  HTN labile  Improved     Plan:  Maintenance HD  UF as tolerated  PRBC tx;   Outpatient arrangement at [facility]  HD for Friday    Harry Osborn MD

## 2022-06-29 NOTE — CHART NOTE - NSCHARTNOTEFT_GEN_A_CORE
Spoke with patient's daughter, Socorro Toro who was planning to visit and discuss plans for ongoing HD with her mother and the care team.  Patient was in HD this morning and spoke with daughter who was aware and planning to come up later.  Called Socorro and left voicemail to let her know patient was returning back to her room, but no return call.  Will continue to follow for continued GOC conversations.
Called by ED physician to evaluate Ms. Knight. History obtained from Daughter and Health care proxy Yovanny Toro [phone]. Patient is a 77 year old woman with a PMHx of ESRD (L arm fistula), HTN, CHF, Asthma, depression and dementia. Per daughter she moved back to [place] in December 2021 from [place]. There she was in hospice care. Was living in [place] and recently moved to [place] on Saturday.     Patient presented to the ED for shortness of breath. Her last HD was on 6/13. Her saturation was in the 80's and was placed on BIPAP with improvement. Labs significant for hyperkalemia and elevated BNP. BP also elevated, 198/96. Per daughter, she was recently hospitalized at [facility] for the same reason. She was getting HD there. However since discharge, the daughter stated the patient is tired and doesn't want to do anymore hemodialysis. She has been refusing to go. She is DNR and DNI. She doesn't want invasive lines or any feeding tube. She would like limited treatment and hospice to be called. Dr. Christine informed. Molst form completed with health care proxy.
Critical value received for H/H 6.7/19.6 . 1 unit PRBC ordered . Called to obtain blood transfusion consent. Pt alert and oriented x 3 . Risk Vs benefits explained to the patient . Patient verbalizes understanding . Consent obtained , witnessed by RN . Pt made aware of possible reaction symptoms and will notify RN if need be. Pt keeps taking off the bipap and desaturating to the 70s. Pt also anxious and requested for anti anxiety meds and sleeping aid. ativan 0.5 mg IVP x 1 ordered . At present pt is otherwise in NAD

## 2022-06-29 NOTE — GOALS OF CARE CONVERSATION - ADVANCED CARE PLANNING - CONVERSATION DETAILS
Met with patient and her daughter, Socorro at the bedside.  Patient said she wants to continue HD and is not ready to die.  We discussed that in the past she has declined HD on the outside and she said she doesn't like HD because of the way it makes her feel, but in spite of that wants to continue HD.  We discussed that if at some point in the future she decides to stop HD, she could receive the support of hospice.  DNR/I on MOLST in chart.

## 2022-06-29 NOTE — PROGRESS NOTE ADULT - SUBJECTIVE AND OBJECTIVE BOX
HPI:  This is a 78 yo F with Hx of HTN, glaucoma, depression and ESRD (TTS), presenting to the ED with daughter due to sob and collapse at home. Patient and daughter wish for her to be on hospice care. She has intentionally skipped HD x 2w and as a result has gotten more sob 80% sPO2 on RA requiring cPAP in ED. She was recently admitted to [facility] for the same reason. Pt c/o of SOB and collapsed at home. one episode of nbnb vomiting in ED. Denies LOC, vertigo, abd pain, dizziness, leg or calf swelling. Pt is anuric.  She is DNR/DNI per daughter but would take noninvasive comfort measures. labs significant for wbx 12.5, hgb 7.8, k 6.2, trop 55, pBNP 99k, creat 16 (26 Jun 2022 06:10)      SUBJECTIVE & OBJECTIVE:   Pt seen and examined at bedside.   no overnight events.     tolerating NC     REVIEW OF SYSTEMS: remaining ROS negative            PHYSICAL EXAM:  Vital Signs Last 24 Hrs  T(C): 36.8 (29 Jun 2022 12:45), Max: 37.4 (28 Jun 2022 23:46)  T(F): 98.2 (29 Jun 2022 12:45), Max: 99.3 (28 Jun 2022 23:46)  HR: 89 (29 Jun 2022 12:45) (68 - 95)  BP: 111/78 (29 Jun 2022 12:45) (111/78 - 224/110)  BP(mean): --  RR: 16 (29 Jun 2022 12:45) (16 - 20)  SpO2: 95% (29 Jun 2022 12:45) (94% - 100%) on NC 2L         GENERAL: obese, NAD, speaking in full sentences, not using accessory muscles , on NC   HEAD:  Atraumatic, Normocephalic  EYES: EOMI, PERRLA, conjunctiva and sclera clear  ENMT: Moist mucous membranes  NECK: Supple, No JVD  NERVOUS SYSTEM:  Alert & Oriented X3, no focal neuro deficits   CHEST/LUNG: good b/l air entry , bibasilar crackles   HEART: Regular rate and rhythm; No murmurs, rubs, or gallops  ABDOMEN: Soft, Nontender, Nondistended; Bowel sounds present  EXTREMITIES:  2+ Peripheral Pulses b/l, No clubbing, cyanosis, calf tenderness or edema b/l    MEDICATIONS  (STANDING):  albuterol/ipratropium for Nebulization 3 milliLiter(s) Nebulizer every 6 hours  amLODIPine   Tablet 5 milliGRAM(s) Oral daily  carvedilol 6.25 milliGRAM(s) Oral every 12 hours  heparin   Injectable 5000 Unit(s) SubCutaneous every 12 hours    MEDICATIONS  (PRN):  acetaminophen     Tablet .. 650 milliGRAM(s) Oral every 6 hours PRN Temp greater or equal to 38C (100.4F), Mild Pain (1 - 3)  aluminum hydroxide/magnesium hydroxide/simethicone Suspension 30 milliLiter(s) Oral every 4 hours PRN Dyspepsia  hydrALAZINE Injectable 5 milliGRAM(s) IV Push every 6 hours PRN if SBP >170 or DBP >100  LORazepam   Injectable 1 milliGRAM(s) IV Push every 4 hours PRN Anxiety  melatonin 3 milliGRAM(s) Oral at bedtime PRN Insomnia  ondansetron Injectable 4 milliGRAM(s) IV Push every 8 hours PRN Nausea and/or Vomiting      LABS:                                   7.1    5.98  )-----------( 198      ( 29 Jun 2022 07:10 )             21.5   06-29    138  |  99  |  30<H>  ----------------------------<  79  3.7   |  27  |  6.40<H>    Ca    8.7      29 Jun 2022 07:10  Phos  5.0     06-29  Mg     2.2     06-29        Magnesium, Serum: 2.6 mg/dL (06-27 @ 07:05)    CAPILLARY BLOOD GLUCOSE      POCT Blood Glucose.: 125 mg/dL (27 Jun 2022 14:16)            RECENT CULTURES:      RADIOLOGY & ADDITIONAL TESTS:          Imaging Personally Reviewed:  [ ] YES  [ ] NO    Consultant(s) Notes Reviewed:  [x ] YES  [ ] NO    Care Discussed with Consultants/Other Providers [x ] YES  [ ] NO  Care discussed in detail with patient and daughter at bedside.  All questions and concerns addressed

## 2022-06-30 LAB
ANION GAP SERPL CALC-SCNC: 8 MMOL/L — SIGNIFICANT CHANGE UP (ref 5–17)
BUN SERPL-MCNC: 17 MG/DL — SIGNIFICANT CHANGE UP (ref 7–23)
CALCIUM SERPL-MCNC: 8.5 MG/DL — SIGNIFICANT CHANGE UP (ref 8.5–10.1)
CHLORIDE SERPL-SCNC: 99 MMOL/L — SIGNIFICANT CHANGE UP (ref 96–108)
CO2 SERPL-SCNC: 29 MMOL/L — SIGNIFICANT CHANGE UP (ref 22–31)
CREAT SERPL-MCNC: 4.56 MG/DL — HIGH (ref 0.5–1.3)
EGFR: 9 ML/MIN/1.73M2 — LOW
GLUCOSE SERPL-MCNC: 107 MG/DL — HIGH (ref 70–99)
HCT VFR BLD CALC: 22.9 % — LOW (ref 34.5–45)
HGB BLD-MCNC: 7.6 G/DL — LOW (ref 11.5–15.5)
MAGNESIUM SERPL-MCNC: 1.9 MG/DL — SIGNIFICANT CHANGE UP (ref 1.6–2.6)
MCHC RBC-ENTMCNC: 27.7 PG — SIGNIFICANT CHANGE UP (ref 27–34)
MCHC RBC-ENTMCNC: 33.2 G/DL — SIGNIFICANT CHANGE UP (ref 32–36)
MCV RBC AUTO: 83.6 FL — SIGNIFICANT CHANGE UP (ref 80–100)
NRBC # BLD: 0 /100 WBCS — SIGNIFICANT CHANGE UP (ref 0–0)
PHOSPHATE SERPL-MCNC: 3.3 MG/DL — SIGNIFICANT CHANGE UP (ref 2.5–4.5)
PLATELET # BLD AUTO: 183 K/UL — SIGNIFICANT CHANGE UP (ref 150–400)
POTASSIUM SERPL-MCNC: 3.2 MMOL/L — LOW (ref 3.5–5.3)
POTASSIUM SERPL-SCNC: 3.2 MMOL/L — LOW (ref 3.5–5.3)
RBC # BLD: 2.74 M/UL — LOW (ref 3.8–5.2)
RBC # FLD: 16.2 % — HIGH (ref 10.3–14.5)
SODIUM SERPL-SCNC: 136 MMOL/L — SIGNIFICANT CHANGE UP (ref 135–145)
WBC # BLD: 5.82 K/UL — SIGNIFICANT CHANGE UP (ref 3.8–10.5)
WBC # FLD AUTO: 5.82 K/UL — SIGNIFICANT CHANGE UP (ref 3.8–10.5)

## 2022-06-30 PROCEDURE — 99232 SBSQ HOSP IP/OBS MODERATE 35: CPT

## 2022-06-30 PROCEDURE — 99233 SBSQ HOSP IP/OBS HIGH 50: CPT

## 2022-06-30 RX ORDER — POTASSIUM CHLORIDE 20 MEQ
40 PACKET (EA) ORAL ONCE
Refills: 0 | Status: COMPLETED | OUTPATIENT
Start: 2022-06-30 | End: 2022-06-30

## 2022-06-30 RX ADMIN — SENNA PLUS 2 TABLET(S): 8.6 TABLET ORAL at 21:05

## 2022-06-30 RX ADMIN — Medication 3 MILLILITER(S): at 00:23

## 2022-06-30 RX ADMIN — ATORVASTATIN CALCIUM 80 MILLIGRAM(S): 80 TABLET, FILM COATED ORAL at 21:05

## 2022-06-30 RX ADMIN — Medication 667 MILLIGRAM(S): at 09:01

## 2022-06-30 RX ADMIN — Medication 81 MILLIGRAM(S): at 12:22

## 2022-06-30 RX ADMIN — Medication 10 MILLIGRAM(S): at 21:05

## 2022-06-30 RX ADMIN — PREGABALIN 1000 MICROGRAM(S): 225 CAPSULE ORAL at 12:21

## 2022-06-30 RX ADMIN — HEPARIN SODIUM 5000 UNIT(S): 5000 INJECTION INTRAVENOUS; SUBCUTANEOUS at 17:29

## 2022-06-30 RX ADMIN — Medication 10 MILLIGRAM(S): at 06:00

## 2022-06-30 RX ADMIN — Medication 60 MILLIGRAM(S): at 06:00

## 2022-06-30 RX ADMIN — Medication 667 MILLIGRAM(S): at 17:29

## 2022-06-30 RX ADMIN — Medication 0.2 MILLIGRAM(S): at 06:00

## 2022-06-30 RX ADMIN — CARVEDILOL PHOSPHATE 25 MILLIGRAM(S): 80 CAPSULE, EXTENDED RELEASE ORAL at 17:29

## 2022-06-30 RX ADMIN — PANTOPRAZOLE SODIUM 40 MILLIGRAM(S): 20 TABLET, DELAYED RELEASE ORAL at 06:00

## 2022-06-30 RX ADMIN — CARVEDILOL PHOSPHATE 25 MILLIGRAM(S): 80 CAPSULE, EXTENDED RELEASE ORAL at 06:00

## 2022-06-30 RX ADMIN — CHLORHEXIDINE GLUCONATE 1 APPLICATION(S): 213 SOLUTION TOPICAL at 12:22

## 2022-06-30 RX ADMIN — HEPARIN SODIUM 5000 UNIT(S): 5000 INJECTION INTRAVENOUS; SUBCUTANEOUS at 06:00

## 2022-06-30 RX ADMIN — Medication 10 MILLIGRAM(S): at 13:20

## 2022-06-30 RX ADMIN — Medication 40 MILLIEQUIVALENT(S): at 12:21

## 2022-06-30 RX ADMIN — Medication 667 MILLIGRAM(S): at 12:21

## 2022-06-30 RX ADMIN — Medication 0.2 MILLIGRAM(S): at 17:29

## 2022-06-30 NOTE — PHYSICAL THERAPY INITIAL EVALUATION ADULT - BED MOBILITY TRAINING, PT EVAL
Patient will transfer in and out of bed independently, with good balance and trunk control in 4 weeks.

## 2022-06-30 NOTE — PROGRESS NOTE ADULT - SUBJECTIVE AND OBJECTIVE BOX
CHIEF COMPLAINT: Follow up of acute respiratory failure  wants to continue HD even as outpatient   no fever  no chest pain   no diarrhea  no active gross bleeding  stated now allowed to get out of the bed.       PHYSICAL EXAM:    GENERAL: Obese, no acute distress   CHEST/LUNG: Few crackles bibasally, no wheezing  HEART: Regular rate and rhythm; No murmurs, rubs  ABDOMEN: Soft, Nontender, Nondistended; Bowel sounds present  EXTREMITIES:  Moving all four extremities spontaneously, No clubbing, cyanosis. + Leg edema  NERVOUS SYSTEM:  Grossly non focal.  Psychiatry: AAO x 3, mood is appropriate       OBJECTIVE DATA:   Vital Signs Last 24 Hrs  T(C): 36.8 (30 Jun 2022 16:00), Max: 37 (29 Jun 2022 19:10)  T(F): 98.3 (30 Jun 2022 16:00), Max: 98.6 (29 Jun 2022 19:10)  HR: 77 (30 Jun 2022 16:00) (64 - 87)  BP: 169/70 (30 Jun 2022 16:00) (142/65 - 173/80)  BP(mean): --  RR: 15 (30 Jun 2022 16:00) (15 - 17)  SpO2: 96% (30 Jun 2022 16:00) (96% - 100%)           Daily     Daily   LABS:                        7.6    5.82  )-----------( 183      ( 30 Jun 2022 07:52 )             22.9             06-30    136  |  99  |  17  ----------------------------<  107<H>  3.2<L>   |  29  |  4.56<H>    Ca    8.5      30 Jun 2022 07:52  Phos  3.3     06-30  Mg     1.9     06-30                        Interval Radiology studies: reviewed by me    < from: CT Chest No Cont (06.29.22 @ 14:27) >  1. Bilateral groundglass opacities may reflect pulmonary edema or   pneumonia.  2. Small right pleural effusion.    < end of copied text >    Tele reviewed by me showed no significant new arrhythmias     MEDICATIONS  (STANDING):  albuterol/ipratropium for Nebulization 3 milliLiter(s) Nebulizer every 6 hours  aspirin enteric coated 81 milliGRAM(s) Oral daily  atorvastatin 80 milliGRAM(s) Oral at bedtime  calcium acetate 667 milliGRAM(s) Oral three times a day with meals  carvedilol 25 milliGRAM(s) Oral every 12 hours  chlorhexidine 2% Cloths 1 Application(s) Topical daily  cloNIDine 0.2 milliGRAM(s) Oral every 12 hours  cyanocobalamin 1000 MICROGram(s) Oral daily  heparin   Injectable 5000 Unit(s) SubCutaneous every 12 hours  hydrALAZINE 10 milliGRAM(s) Oral every 8 hours  NIFEdipine XL 60 milliGRAM(s) Oral daily  pantoprazole    Tablet 40 milliGRAM(s) Oral before breakfast  senna 2 Tablet(s) Oral at bedtime    MEDICATIONS  (PRN):  acetaminophen     Tablet .. 650 milliGRAM(s) Oral every 6 hours PRN Temp greater or equal to 38C (100.4F), Mild Pain (1 - 3)  acetaminophen   IVPB .. 1000 milliGRAM(s) IV Intermittent once PRN pain  aluminum hydroxide/magnesium hydroxide/simethicone Suspension 30 milliLiter(s) Oral every 4 hours PRN Dyspepsia  hydrALAZINE Injectable 5 milliGRAM(s) IV Push every 6 hours PRN if SBP >170 or DBP >100  LORazepam   Injectable 1 milliGRAM(s) IV Push every 4 hours PRN Anxiety  melatonin 3 milliGRAM(s) Oral at bedtime PRN Insomnia  metoclopramide Injectable 5 milliGRAM(s) IV Push every 8 hours PRN nausea/vomiting

## 2022-06-30 NOTE — PHYSICAL THERAPY INITIAL EVALUATION ADULT - ADDITIONAL COMMENTS
As per pt, lives in a pvt house with family, to enter stairs with 4 steps & bilateral hand rails, bed room at the main level of the house, Reports to use a cane and rollator as needed for community ambulation, reports to utilizes furniture and wall support to ambulate inside the house.

## 2022-06-30 NOTE — DIETITIAN INITIAL EVALUATION ADULT - OTHER INFO
Pt lives with daughter who does food shopping/cooking. Pt reports monitoring intake of sodium, phosphorus & potassium rich foods; pt on chronic HD.  Pt reports good appetite & po intake PTA; pt continues with good appetite during admission, po intake > 75%.  Pt denies chewing/swallowing difficulty; no c/o nausea, vomiting, diarrhea, constipation. Pt reports allergy to shellfish.  Pt reports  lbs. x 10 years ago; gradual wt loss noted below.  Palliative care following; per MOLST form, pt DNR/DNI/No feeding tube. Pt lives with daughter who does food shopping/cooking. Pt reports monitoring intake of sodium, phosphorus & potassium rich foods; pt on chronic HD.  Pt reports good appetite & po intake PTA; pt continues with good appetite during admission, po intake > 75%.  Pt denies chewing/swallowing difficulty; no c/o nausea, vomiting, diarrhea, constipation. Pt reports allergy to shellfish.  Pt reports  lbs. x 5 years ago; wt loss noted below.  Palliative care following; per MOLST form, pt DNR/DNI/No feeding tube.

## 2022-06-30 NOTE — PROGRESS NOTE ADULT - PROBLEM SELECTOR PLAN 1
denies nausea at present  offer small more frequent meals   has maalox PRN-noted some dyspepsia but did not want to try maalox
nausea at times-reports Zofran improved nausea  offer small more frequent meals   has maalox PRN

## 2022-06-30 NOTE — DIETITIAN INITIAL EVALUATION ADULT - PERSON TAUGHT/METHOD
Provided pt with Renal nutrition counseling/education materials; pt receptive to diet information./verbal instruction/written material/patient instructed Reviewed renal on HD diet with pt/verbal instruction/written material/patient instructed

## 2022-06-30 NOTE — PROGRESS NOTE ADULT - PROBLEM SELECTOR PLAN 7
serum albumin 3.0  decreased appetite-reports she ate more breakfast today
serum albumin 3.0  decreased appetite

## 2022-06-30 NOTE — PROGRESS NOTE ADULT - PROBLEM SELECTOR PLAN 2
on nasal cannula with NIV on standby, reports breathing feels more comfortable  HD Monday-Wednesday this week
on nasal cannula with NIV on standby, reports breathing feels more comfortable  RRT yesterday for SOB, was NIV

## 2022-06-30 NOTE — PROGRESS NOTE ADULT - TIME BILLING
min spent reviewing chart, examining patient, discussing plan with patient and family
Evaluation of patient, review of medical records and collaboration with care team
Evaluation of patient, review of medical records and collaboration with care team

## 2022-06-30 NOTE — PROGRESS NOTE ADULT - SUBJECTIVE AND OBJECTIVE BOX
NEPHROLOGY PROGRESS NOTE    CHIEF COMPLAINT:  ESRD    HPI:  No complaints    ROS:  no SOB    EXAM:  T(F): 98 (06-30-22 @ 10:25)  HR: 73 (06-30-22 @ 13:02)  BP: 173/80 (06-30-22 @ 13:02)  RR: 17 (06-30-22 @ 10:25)  SpO2: 97% (06-30-22 @ 11:46)    Conversant, in no apparent distress  Normal respiratory effort, lungs clear bilaterally  Heart RRR with no murmur, no peripheral edema         LABS                             7.6    5.82  )-----------( 183      ( 30 Jun 2022 07:52 )             22.9          06-30    136  |  99  |  17  ----------------------------<  107<H>  3.2<L>   |  29  |  4.56<H>    Ca    8.5      30 Jun 2022 07:52  Phos  3.3     06-30  Mg     1.9     06-30             Assessment   ESRD: maintenance  HTN, resistant    Plan:   Maintenance HD tomorrow  Challenge dry weight lower  Outpatient arrangement at [facility]

## 2022-06-30 NOTE — PHYSICAL THERAPY INITIAL EVALUATION ADULT - PERTINENT HX OF CURRENT PROBLEM, REHAB EVAL
AS per ED provider notes, 78 y/o F wPMH of HTN, glaucoma, depression and ESRD (TTS) presents to the ED accompanied with daughter for sob not having dialysis done for x2 weeks. Pt was recently admitted to [facility] for the same reason. Pt c/o of SOB and collapsed at home. Pt In the field was found to be in the x80's on RA

## 2022-06-30 NOTE — PROGRESS NOTE ADULT - SUBJECTIVE AND OBJECTIVE BOX
SUBJECTIVE AND OBJECTIVE: Patient resting in bed  INTERVAL HPI/OVERNIGHT EVENTS:    DNR on chart: yes  Allergies    Drug Allergies Not Recorded  shellfish (Swelling)    Intolerances    MEDICATIONS  (STANDING):  albuterol/ipratropium for Nebulization 3 milliLiter(s) Nebulizer every 6 hours  aspirin enteric coated 81 milliGRAM(s) Oral daily  atorvastatin 80 milliGRAM(s) Oral at bedtime  calcium acetate 667 milliGRAM(s) Oral three times a day with meals  carvedilol 25 milliGRAM(s) Oral every 12 hours  chlorhexidine 2% Cloths 1 Application(s) Topical daily  cloNIDine 0.2 milliGRAM(s) Oral every 12 hours  cyanocobalamin 1000 MICROGram(s) Oral daily  heparin   Injectable 5000 Unit(s) SubCutaneous every 12 hours  hydrALAZINE 10 milliGRAM(s) Oral every 8 hours  NIFEdipine XL 60 milliGRAM(s) Oral daily  pantoprazole    Tablet 40 milliGRAM(s) Oral before breakfast  senna 2 Tablet(s) Oral at bedtime    MEDICATIONS  (PRN):  acetaminophen     Tablet .. 650 milliGRAM(s) Oral every 6 hours PRN Temp greater or equal to 38C (100.4F), Mild Pain (1 - 3)  acetaminophen   IVPB .. 1000 milliGRAM(s) IV Intermittent once PRN pain  aluminum hydroxide/magnesium hydroxide/simethicone Suspension 30 milliLiter(s) Oral every 4 hours PRN Dyspepsia  hydrALAZINE Injectable 5 milliGRAM(s) IV Push every 6 hours PRN if SBP >170 or DBP >100  LORazepam   Injectable 1 milliGRAM(s) IV Push every 4 hours PRN Anxiety  melatonin 3 milliGRAM(s) Oral at bedtime PRN Insomnia  metoclopramide Injectable 5 milliGRAM(s) IV Push every 8 hours PRN nausea/vomiting      ITEMS UNCHECKED ARE NOT PRESENT    PRESENT SYMPTOMS: [ ]Unable to obtain due to poor mentation   Source if other than patient:  [ ]Family   [ ]Team     Pain:  [ ]yes [ x]no  QOL impact -   Location -                    Aggravating factors -  Quality -  Radiation -  Timing-  Severity (0-10 scale):  Minimal acceptable level (0-10 scale):     Dyspnea:                           [ ]Mild [ ]Moderate [ ]Severe  Anxiety:                             [ ]Mild [ ]Moderate [ ]Severe  Fatigue:                             [x ]Mild [ ]Moderate [ ]Severe  Nausea:                             [ ]Mild [ ]Moderate [ ]Severe  Loss of appetite:              [ ]Mild [ ]Moderate [ ]Severe  Constipation:                    [ ]Mild [ ]Moderate [ ]Severe    PAIN AD Score:	  http://geriatrictoolkit.[email or web address]/painad.pdf (Ctrl + left click to view)    Other Symptoms:  [x ]All other review of systems negative     Palliative Performance Status Version 2:       40%      http://[email or web address].org/files/news/palliative_performance_scale_[email or web address].pdf  PHYSICAL EXAM:  Vital Signs Last 24 Hrs  T(C): 36.7 (30 Jun 2022 10:25), Max: 37.1 (29 Jun 2022 15:53)  T(F): 98 (30 Jun 2022 10:25), Max: 98.7 (29 Jun 2022 15:53)  HR: 73 (30 Jun 2022 13:02) (64 - 94)  BP: 173/80 (30 Jun 2022 13:02) (142/65 - 179/82)  BP(mean): --  RR: 17 (30 Jun 2022 10:25) (17 - 20)  SpO2: 97% (30 Jun 2022 11:46) (96% - 100%) I&O's Summary    29 Jun 2022 07:01  -  30 Jun 2022 07:00  --------------------------------------------------------  IN: 0 mL / OUT: 3000 mL / NET: -3000 mL       GENERAL:  [x ]Alert  [ ]Oriented x   [ ]Lethargic  [ ]Cachexia  [ ]Unarousable  [x ]Verbal  [ ]Non-Verbal  Behavioral:   [ ]Anxiety  [ ]Delirium [ ]Agitation [ ]Other  HEENT:  [x ]Normal   [ ]Dry mouth   [ ]ET Tube/Trach  [ ]Oral lesions  PULMONARY:   [ ]Clear [ ]Tachypnea  [ ]Audible excessive secretions   [ ]Rhonchi        [ ]Right [ ]Left [ ]Bilateral  [ ]Crackles        [ ]Right [ ]Left [ ]Bilateral  [ ]Wheezing     [ ]Right [ ]Left [ ]Bilateral  [x]Diminished BS [ ] Right [ ]Left [ x]Bilateral  CARDIOVASCULAR:    [x] Regular [ ]Irregular [ ]Tachy  [ ]Timmy [ ]Murmur [ ]Other  GASTROINTESTINAL:  [ x]Soft  [ ]Distended   [ ]+BS  [ x]Non tender [ ]Tender  [ ]PEG [ ]OGT/ NGT   Last BM:  6/28/22  GENITOURINARY:  [ ]Normal [x ]Incontinent   [ ]Oliguria/Anuria   [ ]Nielson  MUSCULOSKELETAL:   [ ]Normal   [x ]Weakness  [ ]Bed/Wheelchair bound [ ]Edema  NEUROLOGIC:   [x ]No focal deficits  [ ] Cognitive impairment  [ ] Dysphagia [ ]Dysarthria [ ] Paresis [ ]Other   SKIN:   [x ]Normal  [ ]Rash   [ ]Pressure ulcer(s) [ ]y [ ]n present on admission    CRITICAL CARE:  [ ]Shock Present  [ ]Septic [ ]Cardiogenic [ ]Neurologic [ ]Hypovolemic  [ ]Vasopressors [ ]Inotropes  [ ]Respiratory failure present [ ]Mechanical Ventilation [ ]Non-invasive ventilatory support [ ]High-Flow  [ ]Acute  [ ]Chronic [ ]Hypoxic  [ ]Hypercarbic [ ]Other  [ ]Other organ failure     LABS:                        7.6    5.82  )-----------( 183      ( 30 Jun 2022 07:52 )             22.9   06-30    136  |  99  |  17  ----------------------------<  107<H>  3.2<L>   |  29  |  4.56<H>    Ca    8.5      30 Jun 2022 07:52  Phos  3.3     06-30  Mg     1.9     06-30    RADIOLOGY & ADDITIONAL STUDIES:   < from: CT Chest No Cont (06.29.22 @ 14:27) >  IMPRESSION:  1. Bilateral groundglass opacities may reflect pulmonary edema or   pneumonia.  2. Small right pleural effusion.  --- End of Report ---  < end of copied text >    Protein Calorie Malnutrition Present: [ ]mild [ x]moderate [ ]severe [ ]underweight [ ]morbid obesity  https://www.andeal.org/vault/8280/web/files/ONC/Table_Clinical%20Characteristics%20to%20Document%20Malnutrition-White%20JV%20et%20al%202012.pdf    Height (cm): 154.9 (06-27-22 @ 00:36)  Weight (kg): 77.2 (06-27-22 @ 00:36)  BMI (kg/m2): 32.2 (06-27-22 @ 00:36)    [ ]PPSV2 < or = 30%  [ ]significant weight loss [ x]poor nutritional intake [ ]anasarca    [ ]Artificial Nutrition    REFERRALS:   [ ]Chaplaincy  [ ]Hospice  [ ]Child Life  [ ]Social Work  [ ]Case management [ ]Holistic Therapy     Goals of Care Document:

## 2022-06-30 NOTE — PROGRESS NOTE ADULT - PROBLEM SELECTOR PLAN 8
Patient continuing with HD and said she would like to live and is not ready to die.  JOB indicating DNR/I/No feeding tube completed.  Spoke with patient's daughter, Socorro [phone] who said patient has been in and out of the hospital nearly weekly since January of this year because she skips HD then agrees to have HD in the hospital and this has been her cycle.  She was living in [place] and just moved to [place].  Daughter planning to come up to the hospital tomorrow around noon to discuss with her mother.    Discussed with RN and Dr. Henry
Patient with DNR/I/No feeding tube on MOLST in chart.  Patient has discussed with her daughter and palliative that she wishes to continue with HD at this time as she is not ready to die.

## 2022-06-30 NOTE — PHARMACOTHERAPY INTERVENTION NOTE - COMMENTS
Spoke to MD and recommnended to change reglan 10mg iv q 6h to 5mg q8 due to crcl-8ml/min. MD agreed to change to lower dose and longer frequency.
Recommended to replete potassium since it came back 3.2 this morning.

## 2022-06-30 NOTE — DIETITIAN INITIAL EVALUATION ADULT - PERTINENT LABORATORY DATA
06-30    136  |  99  |  17  ----------------------------<  107<H>  3.2<L>   |  29  |  4.56<H>    Ca    8.5      30 Jun 2022 07:52  Phos  3.3     06-30  Mg     1.9     06-30

## 2022-06-30 NOTE — DIETITIAN INITIAL EVALUATION ADULT - NS FNS DIET ORDER
Diet, Regular:   For patients receiving Renal Replacement - No Protein Restr, No Conc K, No Conc Phos, Low Sodium (RENAL) (06-26-22 @ 01:43) [Active]

## 2022-06-30 NOTE — DIETITIAN NUTRITION RISK NOTIFICATION - TREATMENT: THE FOLLOWING DIET HAS BEEN RECOMMENDED
Diet, Renal Restrictions:   For patients receiving Renal Replacement - No Protein Restr, No Conc K, No Conc Phos, Low Sodium  Supplement Feeding Modality:  Oral  Nepro Cans or Servings Per Day:  1       Frequency:  Daily (06-30-22 @ 12:16) [Pending Verification By Attending]  Diet, Regular:   For patients receiving Renal Replacement - No Protein Restr, No Conc K, No Conc Phos, Low Sodium (RENAL) (06-26-22 @ 01:43) [Active]

## 2022-06-30 NOTE — PROGRESS NOTE ADULT - PROBLEM SELECTOR PLAN 3
HD yesterday-patient said she skipped 1 week of HD  patient would like to continue with HD  avoid nephrotoxic agents
HD yesterday-patient said she skipped 1 week of HD  patient would like to continue with HD  avoid nephrotoxic agents  nephrology working on outpatient HD arrangements for location closer to home

## 2022-06-30 NOTE — PROGRESS NOTE ADULT - PROBLEM SELECTOR PLAN 5
was hypertensive yesterday  s/p HD yesterday   monitor BP  antihypertensives as ordered
BP labile  s/p HD yesterday   monitor BP  antihypertensives as ordered

## 2022-07-01 LAB
FLUAV AG NPH QL: SIGNIFICANT CHANGE UP
FLUBV AG NPH QL: SIGNIFICANT CHANGE UP
HBV SURFACE AB SER-ACNC: REACTIVE
SARS-COV-2 RNA SPEC QL NAA+PROBE: SIGNIFICANT CHANGE UP

## 2022-07-01 PROCEDURE — 99232 SBSQ HOSP IP/OBS MODERATE 35: CPT

## 2022-07-01 RX ORDER — HYDRALAZINE HCL 50 MG
25 TABLET ORAL EVERY 8 HOURS
Refills: 0 | Status: DISCONTINUED | OUTPATIENT
Start: 2022-07-01 | End: 2022-07-02

## 2022-07-01 RX ORDER — ALBUTEROL 90 UG/1
2 AEROSOL, METERED ORAL EVERY 6 HOURS
Refills: 0 | Status: DISCONTINUED | OUTPATIENT
Start: 2022-07-01 | End: 2022-07-03

## 2022-07-01 RX ADMIN — CARVEDILOL PHOSPHATE 25 MILLIGRAM(S): 80 CAPSULE, EXTENDED RELEASE ORAL at 06:12

## 2022-07-01 RX ADMIN — Medication 81 MILLIGRAM(S): at 13:28

## 2022-07-01 RX ADMIN — HEPARIN SODIUM 5000 UNIT(S): 5000 INJECTION INTRAVENOUS; SUBCUTANEOUS at 06:12

## 2022-07-01 RX ADMIN — Medication 650 MILLIGRAM(S): at 08:32

## 2022-07-01 RX ADMIN — Medication 25 MILLIGRAM(S): at 21:25

## 2022-07-01 RX ADMIN — PANTOPRAZOLE SODIUM 40 MILLIGRAM(S): 20 TABLET, DELAYED RELEASE ORAL at 06:12

## 2022-07-01 RX ADMIN — Medication 10 MILLIGRAM(S): at 06:12

## 2022-07-01 RX ADMIN — Medication 100 MILLIGRAM(S): at 21:24

## 2022-07-01 RX ADMIN — Medication 60 MILLIGRAM(S): at 06:12

## 2022-07-01 RX ADMIN — Medication 5 MILLIGRAM(S): at 23:49

## 2022-07-01 RX ADMIN — CHLORHEXIDINE GLUCONATE 1 APPLICATION(S): 213 SOLUTION TOPICAL at 13:29

## 2022-07-01 RX ADMIN — Medication 10 MILLIGRAM(S): at 13:28

## 2022-07-01 RX ADMIN — Medication 0.2 MILLIGRAM(S): at 17:22

## 2022-07-01 RX ADMIN — HEPARIN SODIUM 5000 UNIT(S): 5000 INJECTION INTRAVENOUS; SUBCUTANEOUS at 17:23

## 2022-07-01 RX ADMIN — Medication 667 MILLIGRAM(S): at 17:22

## 2022-07-01 RX ADMIN — Medication 0.2 MILLIGRAM(S): at 06:12

## 2022-07-01 RX ADMIN — Medication 667 MILLIGRAM(S): at 08:30

## 2022-07-01 RX ADMIN — Medication 650 MILLIGRAM(S): at 09:12

## 2022-07-01 RX ADMIN — Medication 667 MILLIGRAM(S): at 13:28

## 2022-07-01 RX ADMIN — ATORVASTATIN CALCIUM 80 MILLIGRAM(S): 80 TABLET, FILM COATED ORAL at 21:25

## 2022-07-01 RX ADMIN — Medication 3 MILLILITER(S): at 00:35

## 2022-07-01 RX ADMIN — PREGABALIN 1000 MICROGRAM(S): 225 CAPSULE ORAL at 13:28

## 2022-07-01 RX ADMIN — CARVEDILOL PHOSPHATE 25 MILLIGRAM(S): 80 CAPSULE, EXTENDED RELEASE ORAL at 17:23

## 2022-07-01 NOTE — PROGRESS NOTE ADULT - SUBJECTIVE AND OBJECTIVE BOX
[facility] NEPHROLOGY SERVICES, [facility]  NEPHROLOGY AND HYPERTENSION  [address]  [address]  [place] [address]  [phone]    MD MATTHEW PATIÑO MD ANDREY GONCHARUK, MD MADHU KORRAPATI, MD YELENA ROSENBERG, MD BINNY KOSHY, MD CHRISTOPHER CAPUTO, MD EDWARD BOVER, MD          Patient events noted    MEDICATIONS  (STANDING):  aspirin enteric coated 81 milliGRAM(s) Oral daily  atorvastatin 80 milliGRAM(s) Oral at bedtime  calcium acetate 667 milliGRAM(s) Oral three times a day with meals  carvedilol 25 milliGRAM(s) Oral every 12 hours  chlorhexidine 2% Cloths 1 Application(s) Topical daily  cloNIDine 0.2 milliGRAM(s) Oral every 12 hours  cyanocobalamin 1000 MICROGram(s) Oral daily  heparin   Injectable 5000 Unit(s) SubCutaneous every 12 hours  hydrALAZINE 25 milliGRAM(s) Oral every 8 hours  NIFEdipine XL 60 milliGRAM(s) Oral daily  pantoprazole    Tablet 40 milliGRAM(s) Oral before breakfast  senna 2 Tablet(s) Oral at bedtime    MEDICATIONS  (PRN):  acetaminophen     Tablet .. 650 milliGRAM(s) Oral every 6 hours PRN Temp greater or equal to 38C (100.4F), Mild Pain (1 - 3)  ALBUTerol    90 MICROgram(s) HFA Inhaler 2 Puff(s) Inhalation every 6 hours PRN Shortness of Breath and/or Wheezing  aluminum hydroxide/magnesium hydroxide/simethicone Suspension 30 milliLiter(s) Oral every 4 hours PRN Dyspepsia  benzonatate 100 milliGRAM(s) Oral three times a day PRN Cough  hydrALAZINE Injectable 5 milliGRAM(s) IV Push every 6 hours PRN if SBP >170 or DBP >100  melatonin 3 milliGRAM(s) Oral at bedtime PRN Insomnia  metoclopramide Injectable 5 milliGRAM(s) IV Push every 8 hours PRN nausea/vomiting      07-01-22 @ 07:01  -  07-01-22 @ 22:15  --------------------------------------------------------  IN: 0 mL / OUT: 3000 mL / NET: -3000 mL      PHYSICAL EXAM:      T(C): 36.9 (07-01-22 @ 16:40), Max: 37.2 (06-30-22 @ 23:47)  HR: 66 (07-01-22 @ 21:20) (58 - 79)  BP: 181/82 (07-01-22 @ 21:20) (157/77 - 181/82)  RR: 18 (07-01-22 @ 16:40) (18 - 20)  SpO2: 98% (07-01-22 @ 16:40) (96% - 100%)  Wt(kg): --  Lungs clear  Heart S1S2  Abd soft NT ND  Extremities:   tr edema                                    7.6    5.82  )-----------( 183      ( 30 Jun 2022 07:52 )             22.9     06-30    136  |  99  |  17  ----------------------------<  107<H>  3.2<L>   |  29  |  4.56<H>    Ca    8.5      30 Jun 2022 07:52  Phos  3.3     06-30  Mg     1.9     06-30          Creatinine Trend: 4.56<--, 6.40<--, 5.29<--, 17.30<--, 16.80<--, 16.60<--      Assessment   ESRD, maintenance       Plan:  HD for today and tomorrow  Discharge post HD;   Outpatient HD at [facility] [place]    Harry Osborn MD

## 2022-07-01 NOTE — PROGRESS NOTE ADULT - SUBJECTIVE AND OBJECTIVE BOX
CHIEF COMPLAINT: Follow up of acute respiratory failure  no fever  no chest pain   no diarrhea  no active gross bleeding      PHYSICAL EXAM:    GENERAL: Obese, no acute distress   CHEST/LUNG: CTA bilaterally, no wheezing  HEART: Regular rate and rhythm; No murmurs, rubs  ABDOMEN: Soft, Nontender, Nondistended; Bowel sounds present  EXTREMITIES:  Moving all four extremities spontaneously, No clubbing, cyanosis. + improving Leg edema  NERVOUS SYSTEM:  Grossly non focal.  Psychiatry: AAO x 3, mood is appropriate       OBJECTIVE DATA:     Vital Signs Last 24 Hrs  T(C): 36.9 (2022 16:40), Max: 37.2 (2022 23:47)  T(F): 98.5 (2022 16:40), Max: 98.9 (2022 23:47)  HR: 69 (2022 16:40) (58 - 79)  BP: 174/73 (2022 16:40) (154/74 - 180/87)  BP(mean): 107 (2022 16:40) (107 - 107)  RR: 18 (2022 16:40) (18 - 20)  SpO2: 98% (2022 16:40) (96% - 100%)           Daily     Daily Weight in k (2022 12:40)  LABS:                        7.6    5.82  )-----------( 183      ( 2022 07:52 )             22.9             06-30    136  |  99  |  17  ----------------------------<  107<H>  3.2<L>   |  29  |  4.56<H>    Ca    8.5      2022 07:52  Phos  3.3     06-30  Mg     1.9     06-30                  MEDICATIONS  (STANDING):  aspirin enteric coated 81 milliGRAM(s) Oral daily  atorvastatin 80 milliGRAM(s) Oral at bedtime  calcium acetate 667 milliGRAM(s) Oral three times a day with meals  carvedilol 25 milliGRAM(s) Oral every 12 hours  chlorhexidine 2% Cloths 1 Application(s) Topical daily  cloNIDine 0.2 milliGRAM(s) Oral every 12 hours  cyanocobalamin 1000 MICROGram(s) Oral daily  heparin   Injectable 5000 Unit(s) SubCutaneous every 12 hours  hydrALAZINE 25 milliGRAM(s) Oral every 8 hours  NIFEdipine XL 60 milliGRAM(s) Oral daily  pantoprazole    Tablet 40 milliGRAM(s) Oral before breakfast  senna 2 Tablet(s) Oral at bedtime    MEDICATIONS  (PRN):  acetaminophen     Tablet .. 650 milliGRAM(s) Oral every 6 hours PRN Temp greater or equal to 38C (100.4F), Mild Pain (1 - 3)  ALBUTerol    90 MICROgram(s) HFA Inhaler 2 Puff(s) Inhalation every 6 hours PRN Shortness of Breath and/or Wheezing  aluminum hydroxide/magnesium hydroxide/simethicone Suspension 30 milliLiter(s) Oral every 4 hours PRN Dyspepsia  hydrALAZINE Injectable 5 milliGRAM(s) IV Push every 6 hours PRN if SBP >170 or DBP >100  melatonin 3 milliGRAM(s) Oral at bedtime PRN Insomnia  metoclopramide Injectable 5 milliGRAM(s) IV Push every 8 hours PRN nausea/vomiting

## 2022-07-02 ENCOUNTER — TRANSCRIPTION ENCOUNTER (OUTPATIENT)
Age: 77
End: 2022-07-02

## 2022-07-02 LAB
ANION GAP SERPL CALC-SCNC: 7 MMOL/L — SIGNIFICANT CHANGE UP (ref 5–17)
BUN SERPL-MCNC: 19 MG/DL — SIGNIFICANT CHANGE UP (ref 7–23)
CALCIUM SERPL-MCNC: 8.5 MG/DL — SIGNIFICANT CHANGE UP (ref 8.5–10.1)
CHLORIDE SERPL-SCNC: 98 MMOL/L — SIGNIFICANT CHANGE UP (ref 96–108)
CO2 SERPL-SCNC: 31 MMOL/L — SIGNIFICANT CHANGE UP (ref 22–31)
CREAT SERPL-MCNC: 4.87 MG/DL — HIGH (ref 0.5–1.3)
EGFR: 9 ML/MIN/1.73M2 — LOW
GLUCOSE BLDC GLUCOMTR-MCNC: 103 MG/DL — HIGH (ref 70–99)
GLUCOSE SERPL-MCNC: 138 MG/DL — HIGH (ref 70–99)
HCT VFR BLD CALC: 26.3 % — LOW (ref 34.5–45)
HGB BLD-MCNC: 8.8 G/DL — LOW (ref 11.5–15.5)
MCHC RBC-ENTMCNC: 28 PG — SIGNIFICANT CHANGE UP (ref 27–34)
MCHC RBC-ENTMCNC: 33.5 G/DL — SIGNIFICANT CHANGE UP (ref 32–36)
MCV RBC AUTO: 83.8 FL — SIGNIFICANT CHANGE UP (ref 80–100)
NRBC # BLD: 0 /100 WBCS — SIGNIFICANT CHANGE UP (ref 0–0)
PLATELET # BLD AUTO: 240 K/UL — SIGNIFICANT CHANGE UP (ref 150–400)
POTASSIUM SERPL-MCNC: 3.5 MMOL/L — SIGNIFICANT CHANGE UP (ref 3.5–5.3)
POTASSIUM SERPL-SCNC: 3.5 MMOL/L — SIGNIFICANT CHANGE UP (ref 3.5–5.3)
RBC # BLD: 3.14 M/UL — LOW (ref 3.8–5.2)
RBC # FLD: 15.3 % — HIGH (ref 10.3–14.5)
SODIUM SERPL-SCNC: 136 MMOL/L — SIGNIFICANT CHANGE UP (ref 135–145)
WBC # BLD: 6.98 K/UL — SIGNIFICANT CHANGE UP (ref 3.8–10.5)
WBC # FLD AUTO: 6.98 K/UL — SIGNIFICANT CHANGE UP (ref 3.8–10.5)

## 2022-07-02 PROCEDURE — 99232 SBSQ HOSP IP/OBS MODERATE 35: CPT

## 2022-07-02 RX ORDER — PANTOPRAZOLE SODIUM 20 MG/1
1 TABLET, DELAYED RELEASE ORAL
Qty: 20 | Refills: 0
Start: 2022-07-02

## 2022-07-02 RX ORDER — HYDRALAZINE HCL 50 MG
1 TABLET ORAL
Qty: 90 | Refills: 0
Start: 2022-07-02

## 2022-07-02 RX ORDER — PREGABALIN 225 MG/1
1 CAPSULE ORAL
Qty: 30 | Refills: 0
Start: 2022-07-02

## 2022-07-02 RX ORDER — HYDRALAZINE HCL 50 MG
50 TABLET ORAL EVERY 8 HOURS
Refills: 0 | Status: DISCONTINUED | OUTPATIENT
Start: 2022-07-02 | End: 2022-07-03

## 2022-07-02 RX ORDER — SENNA PLUS 8.6 MG/1
2 TABLET ORAL
Qty: 0 | Refills: 0 | DISCHARGE
Start: 2022-07-02

## 2022-07-02 RX ADMIN — CARVEDILOL PHOSPHATE 25 MILLIGRAM(S): 80 CAPSULE, EXTENDED RELEASE ORAL at 05:40

## 2022-07-02 RX ADMIN — Medication 667 MILLIGRAM(S): at 08:52

## 2022-07-02 RX ADMIN — Medication 81 MILLIGRAM(S): at 14:44

## 2022-07-02 RX ADMIN — HEPARIN SODIUM 5000 UNIT(S): 5000 INJECTION INTRAVENOUS; SUBCUTANEOUS at 05:40

## 2022-07-02 RX ADMIN — HEPARIN SODIUM 5000 UNIT(S): 5000 INJECTION INTRAVENOUS; SUBCUTANEOUS at 17:46

## 2022-07-02 RX ADMIN — Medication 0.2 MILLIGRAM(S): at 17:46

## 2022-07-02 RX ADMIN — ATORVASTATIN CALCIUM 80 MILLIGRAM(S): 80 TABLET, FILM COATED ORAL at 21:08

## 2022-07-02 RX ADMIN — Medication 100 MILLIGRAM(S): at 04:26

## 2022-07-02 RX ADMIN — Medication 667 MILLIGRAM(S): at 17:45

## 2022-07-02 RX ADMIN — CARVEDILOL PHOSPHATE 25 MILLIGRAM(S): 80 CAPSULE, EXTENDED RELEASE ORAL at 17:46

## 2022-07-02 RX ADMIN — PREGABALIN 1000 MICROGRAM(S): 225 CAPSULE ORAL at 14:44

## 2022-07-02 RX ADMIN — Medication 50 MILLIGRAM(S): at 14:47

## 2022-07-02 RX ADMIN — Medication 50 MILLIGRAM(S): at 21:08

## 2022-07-02 RX ADMIN — Medication 60 MILLIGRAM(S): at 05:40

## 2022-07-02 RX ADMIN — CHLORHEXIDINE GLUCONATE 1 APPLICATION(S): 213 SOLUTION TOPICAL at 14:47

## 2022-07-02 RX ADMIN — Medication 0.2 MILLIGRAM(S): at 05:40

## 2022-07-02 RX ADMIN — PANTOPRAZOLE SODIUM 40 MILLIGRAM(S): 20 TABLET, DELAYED RELEASE ORAL at 05:41

## 2022-07-02 RX ADMIN — Medication 25 MILLIGRAM(S): at 05:40

## 2022-07-02 NOTE — PROGRESS NOTE ADULT - SUBJECTIVE AND OBJECTIVE BOX
NEPHROLOGY PROGRESS NOTE    CHIEF COMPLAINT:  ESRD    HPI:  Seen on dialysis.  No complaints.  Access works well.  BP stable.    ROS:  no SOB    EXAM:  T(F): 98.5 (07-02-22 @ 14:53)  HR: 68 (07-02-22 @ 14:53)  BP: 188/78 (07-02-22 @ 14:53)  RR: 16 (07-02-22 @ 14:53)  SpO2: 95% (07-02-22 @ 14:53)    Conversant, in no apparent distress  Normal respiratory effort, lungs clear bilaterally  Heart RRR with no murmur, no peripheral edema         LABS                             8.8    6.98  )-----------( 240      ( 02 Jul 2022 13:00 )             26.3          07-02    136  |  98  |  19  ----------------------------<  138<H>  3.5   |  31  |  4.87<H>    Ca    8.5      02 Jul 2022 13:00               Assessment   ESRD, maintenance     Plan:  HD for today and tomorrow  Discharge post HD to outpatient HD at [facility] on TTS schedule       NEPHROLOGY PROGRESS NOTE    CHIEF COMPLAINT:  ESRD    HPI:  Seen on dialysis.  No complaints.  Access works well.  BP stable.    ROS:  no SOB    EXAM:  T(F): 98.5 (07-02-22 @ 14:53)  HR: 68 (07-02-22 @ 14:53)  BP: 188/78 (07-02-22 @ 14:53)  RR: 16 (07-02-22 @ 14:53)  SpO2: 95% (07-02-22 @ 14:53)    Conversant, in no apparent distress  Normal respiratory effort, lungs clear bilaterally  Heart RRR with no murmur, no peripheral edema         LABS                             8.8    6.98  )-----------( 240      ( 02 Jul 2022 13:00 )             26.3          07-02    136  |  98  |  19  ----------------------------<  138<H>  3.5   |  31  |  4.87<H>    Ca    8.5      02 Jul 2022 13:00               Assessment   ESRD, maintenance     Plan:  Complete HD today  May discharge post HD to outpatient HD at [facility] center on TTS schedule

## 2022-07-02 NOTE — DISCHARGE NOTE PROVIDER - NSDCMRMEDTOKEN_GEN_ALL_CORE_FT
aspirin 81 mg oral tablet, chewable: 1 tab(s) orally once a day  atorvastatin 80 mg oral tablet: 1 tab(s) orally once a day  carvedilol 25 mg oral tablet: 1 tab(s) orally 2 times a day  cloNIDine 0.2 mg oral tablet: 1 tab(s) orally 2 times a day  loperamide 2 mg oral capsule: 1 day(s) orally once a day  NIFEdipine 60 mg oral tablet, extended release: 1 tab(s) orally once a day  senna leaf extract oral tablet: 2 tab(s) orally once a day (at bedtime)  Zofran ODT 4 mg oral tablet, disintegratin tab(s) orally 3 times a day   aspirin 81 mg oral tablet, chewable: 1 tab(s) orally once a day  atorvastatin 80 mg oral tablet: 1 tab(s) orally once a day  carvedilol 25 mg oral tablet: 1 tab(s) orally 2 times a day  cloNIDine 0.2 mg oral tablet: 1 tab(s) orally 2 times a day  cyanocobalamin 1000 mcg oral tablet: 1 tab(s) orally once a day  hydrALAZINE 50 mg oral tablet: 1 tab(s) orally every 8 hours  loperamide 2 mg oral capsule: 1 day(s) orally once a day  NIFEdipine 60 mg oral tablet, extended release: 1 tab(s) orally once a day  pantoprazole 40 mg oral delayed release tablet: 1 tab(s) orally once a day (before a meal)  senna leaf extract oral tablet: 2 tab(s) orally once a day (at bedtime)  Zofran ODT 4 mg oral tablet, disintegratin tab(s) orally 3 times a day

## 2022-07-02 NOTE — DISCHARGE NOTE PROVIDER - PROVIDER TOKENS
FREE:[LAST:[your PCP in a week],PHONE:[(   )    -],FAX:[(   )    -]],PROVIDER:[TOKEN:[[id number]],FOLLOWUP:[1 month]]

## 2022-07-02 NOTE — PROGRESS NOTE ADULT - SUBJECTIVE AND OBJECTIVE BOX
CHIEF COMPLAINT: Follow up of acute respiratory failure  no chest pain   no diarrhea  daughter concerned about low grade fever and not ready to take patient home.       PHYSICAL EXAM:    GENERAL: Obese, no acute distress   CHEST/LUNG: CTA bilaterally, no wheezing  HEART: Regular rate and rhythm; No murmurs, rubs  ABDOMEN: Soft, Nontender, Nondistended; Bowel sounds present  EXTREMITIES:  Moving all four extremities spontaneously, No clubbing, cyanosis. + improving Leg edema  NERVOUS SYSTEM:  Grossly non focal.  Psychiatry: AAO x 3, mood is appropriate       OBJECTIVE DATA:     vital signs reviewed and stable.              7.6    5.82  )-----------( 183      ( 30 Jun 2022 07:52 )             22.9             06-30    136  |  99  |  17  ----------------------------<  107<H>  3.2<L>   |  29  |  4.56<H>    Ca    8.5      30 Jun 2022 07:52  Phos  3.3     06-30  Mg     1.9     06-30                  MEDICATIONS  (STANDING):  aspirin enteric coated 81 milliGRAM(s) Oral daily  atorvastatin 80 milliGRAM(s) Oral at bedtime  calcium acetate 667 milliGRAM(s) Oral three times a day with meals  carvedilol 25 milliGRAM(s) Oral every 12 hours  chlorhexidine 2% Cloths 1 Application(s) Topical daily  cloNIDine 0.2 milliGRAM(s) Oral every 12 hours  cyanocobalamin 1000 MICROGram(s) Oral daily  heparin   Injectable 5000 Unit(s) SubCutaneous every 12 hours  hydrALAZINE 25 milliGRAM(s) Oral every 8 hours  NIFEdipine XL 60 milliGRAM(s) Oral daily  pantoprazole    Tablet 40 milliGRAM(s) Oral before breakfast  senna 2 Tablet(s) Oral at bedtime    MEDICATIONS  (PRN):  acetaminophen     Tablet .. 650 milliGRAM(s) Oral every 6 hours PRN Temp greater or equal to 38C (100.4F), Mild Pain (1 - 3)  ALBUTerol    90 MICROgram(s) HFA Inhaler 2 Puff(s) Inhalation every 6 hours PRN Shortness of Breath and/or Wheezing  aluminum hydroxide/magnesium hydroxide/simethicone Suspension 30 milliLiter(s) Oral every 4 hours PRN Dyspepsia  hydrALAZINE Injectable 5 milliGRAM(s) IV Push every 6 hours PRN if SBP >170 or DBP >100  melatonin 3 milliGRAM(s) Oral at bedtime PRN Insomnia  metoclopramide Injectable 5 milliGRAM(s) IV Push every 8 hours PRN nausea/vomiting

## 2022-07-02 NOTE — DISCHARGE NOTE PROVIDER - NSDCCPCAREPLAN_GEN_ALL_CORE_FT
PRINCIPAL DISCHARGE DIAGNOSIS  Diagnosis: Pulmonary edema  Assessment and Plan of Treatment:       SECONDARY DISCHARGE DIAGNOSES  Diagnosis: Hyperkalemia  Assessment and Plan of Treatment:

## 2022-07-02 NOTE — DISCHARGE NOTE PROVIDER - CARE PROVIDER_API CALL
your PCP in a week,   Phone: (   )    -  Fax: (   )    -  Follow Up Time:     Harry Osborn  INTERNAL MEDICINE  [address], [address] [place] [id number]  Phone: [phone]  Fax: [phone]  Follow Up Time: 1 month

## 2022-07-02 NOTE — DISCHARGE NOTE PROVIDER - HOSPITAL COURSE
HPI: 78 yo F with Hx of HTN, glaucoma, depression, ESRD (TTS, left AVF, anuric ), presenting to the ED with daughter due to SOB after she skipped her HD x 1 week. Patient moved to [place] and has a difficulty time getting to her HD center in [place]. Initially on admission patient states she wanted hospice and to stop HD but later changed her mind.   Patient was placed on BIPAP in ER.         Course:   Acute respiratory failure with hypoxia d/t fluid overload sec to missed HD  ESRD on HD TTS via LUE AVF with good thrill , patient is anuric   HTN've emergency POA BP >200s/100s  Elevated troponins, most likely demand ischemia sec to above   --cont outpatient HD per renal.   --cont NC oxygen  ---CT chest showed edema.   ---advised compliant with blood pressure meds and check blood pressure at home and keep a log for PCP.   Discussed with care manager. ok to dc home today.     Hyperkalemia POA   received lokelma in ER , calcium gluconate , HD   resolved     Normocytic anemia , AOCD   s/p blood transfusion.   no active gross bleeding.   H and H low but stable.     Leukocytosis POA, most likely reactive, normalize. No infection evidence.     HLD --c/w statin    H/o depression   --denies SI/HI     Moderate PCM: Encouraged oral intake. Appreciated nutritional consult recs.     DNR/DNI    Seen and examined by me today. Vitals stable.   I have discussed all the inpatient radiographic findings with the patient and stressed that patient follows with the PCP for further outpatient care. I have educated patient to use [employer] patient portal (as instructed on the discharge paperwork) to access medical records outside the hospital.   All questions welcomed and answered appropriately. Patient verbalized understanding of post discharge physician's follows up and discharge instructions.   DC time spent by me face to face excluding billable procedures 38 mins     HPI: 78 yo F with Hx of HTN, glaucoma, depression, ESRD (TTS, left AVF, anuric ), presenting to the ED with daughter due to SOB after she skipped her HD x 1 week. Patient moved to [place] and has a difficulty time getting to her HD center in [place]. Initially on admission patient states she wanted hospice and to stop HD but later changed her mind.   Patient was placed on BIPAP in ER.         Course:   Acute respiratory failure with hypoxia d/t fluid overload sec to missed HD  ESRD on HD TTS via LUE AVF with good thrill , patient is anuric   HTN've emergency POA BP >200s/100s  Elevated troponins, most likely demand ischemia sec to above   --cont outpatient HD per renal.   --cont NC oxygen  ---CT chest showed edema.   ---advised compliant with blood pressure meds and check blood pressure at home and keep a log for PCP.   Discussed with care manager. ok to dc home today.     Hyperkalemia POA   received lokelma in ER , calcium gluconate , HD   resolved     Normocytic anemia , AOCD   s/p blood transfusion.   no active gross bleeding.   H and H low but stable.     Leukocytosis POA, most likely reactive, normalize. No infection evidence.     HLD --c/w statin    H/o depression   --denies SI/HI     Moderate PCM: Encouraged oral intake. Appreciated nutritional consult recs.     DNR/DNI    Seen and examined by me today. Vitals stable.   I have discussed all the inpatient radiographic findings with the patient and stressed that patient follows with the PCP for further outpatient care. I have educated patient to use [employer] patient portal (as instructed on the discharge paperwork) to access medical records outside the hospital.   All questions welcomed and answered appropriately. Patient verbalized understanding of post discharge physician's follows up and discharge instructions.   DC time spent by me face to face excluding billable procedures 38 mins (spoke with the daughter on the phone also)

## 2022-07-02 NOTE — DISCHARGE NOTE PROVIDER - NSDCFUADDINST_GEN_ALL_CORE_FT
1) It is important to see your primary physician as well as other necessary consultants within next 7-10 days to perform a comprehensive medical review.  Call their offices for an appointment as soon as you leave the hospital.  If you do not have a primary physician or unable to reach your PCP, contact the [facility] Physician Referral Service at [phone].  Your medical issues appear to be stable at this time, but if your symptoms recur or worsen, contact your physicians and/or return to the hospital if necessary.  If you encounter any issues or questions with your medication, call your physicians before stopping the medication.    2) Please access [facility] Patient portal (as instructed on the discharge paperwork) to access your medical records at any time after discharge.     3) Please check blood pressure at home and keep a log for PCP.

## 2022-07-03 ENCOUNTER — TRANSCRIPTION ENCOUNTER (OUTPATIENT)
Age: 77
End: 2022-07-03

## 2022-07-03 VITALS
HEART RATE: 65 BPM | OXYGEN SATURATION: 98 % | SYSTOLIC BLOOD PRESSURE: 165 MMHG | RESPIRATION RATE: 18 BRPM | DIASTOLIC BLOOD PRESSURE: 80 MMHG | TEMPERATURE: 98 F

## 2022-07-03 PROCEDURE — 99239 HOSP IP/OBS DSCHRG MGMT >30: CPT

## 2022-07-03 RX ADMIN — Medication 667 MILLIGRAM(S): at 09:18

## 2022-07-03 RX ADMIN — Medication 50 MILLIGRAM(S): at 05:02

## 2022-07-03 RX ADMIN — CARVEDILOL PHOSPHATE 25 MILLIGRAM(S): 80 CAPSULE, EXTENDED RELEASE ORAL at 05:02

## 2022-07-03 RX ADMIN — HEPARIN SODIUM 5000 UNIT(S): 5000 INJECTION INTRAVENOUS; SUBCUTANEOUS at 05:02

## 2022-07-03 RX ADMIN — Medication 60 MILLIGRAM(S): at 05:02

## 2022-07-03 RX ADMIN — Medication 650 MILLIGRAM(S): at 04:49

## 2022-07-03 RX ADMIN — Medication 0.2 MILLIGRAM(S): at 05:02

## 2022-07-03 NOTE — PROGRESS NOTE ADULT - NUTRITIONAL ASSESSMENT
This patient has been assessed with a concern for Malnutrition and has been determined to have a diagnosis/diagnoses of Moderate protein-calorie malnutrition.    This patient is being managed with:   Diet Renal Restrictions-  For patients receiving Renal Replacement - No Protein Restr No Conc K No Conc Phos Low Sodium  Supplement Feeding Modality:  Oral  Nepro Cans or Servings Per Day:  1       Frequency:  Daily  Entered: Jun 30 2022 12:16PM    
This patient has been assessed with a concern for Malnutrition and has been determined to have a diagnosis/diagnoses of Moderate protein-calorie malnutrition.    This patient is being managed with:   Diet Renal Restrictions-  For patients receiving Renal Replacement - No Protein Restr No Conc K No Conc Phos Low Sodium  Supplement Feeding Modality:  Oral  Nepro Cans or Servings Per Day:  1       Frequency:  Daily  Entered: Jun 30 2022 12:16PM    
This patient has been assessed with a concern for Malnutrition and has been determined to have a diagnosis/diagnoses of Moderate protein-calorie malnutrition.    This patient is being managed with:   Diet Regular-  For patients receiving Renal Replacement - No Protein Restr No Conc K No Conc Phos Low Sodium (RENAL)  Entered: Jun 26 2022  1:43AM    The following pending diet order is being considered for treatment of Moderate protein-calorie malnutrition:  Diet Renal Restrictions-  For patients receiving Renal Replacement - No Protein Restr No Conc K No Conc Phos Low Sodium  Supplement Feeding Modality:  Oral  Nepro Cans or Servings Per Day:  1       Frequency:  Daily  Entered: Jun 30 2022 12:16PM  
This patient has been assessed with a concern for Malnutrition and has been determined to have a diagnosis/diagnoses of Moderate protein-calorie malnutrition.    This patient is being managed with:   Diet Renal Restrictions-  For patients receiving Renal Replacement - No Protein Restr No Conc K No Conc Phos Low Sodium  Supplement Feeding Modality:  Oral  Nepro Cans or Servings Per Day:  1       Frequency:  Daily  Entered: Jun 30 2022 12:16PM

## 2022-07-03 NOTE — DISCHARGE NOTE NURSING/CASE MANAGEMENT/SOCIAL WORK - NSDCPEFALRISK_GEN_ALL_CORE
For information on Fall & Injury Prevention, visit: [email or web address] OR  [email or web address] OR  https://www.cdc.gov/steadi/patient.html

## 2022-07-03 NOTE — PROGRESS NOTE ADULT - SUBJECTIVE AND OBJECTIVE BOX
NEPHROLOGY PROGRESS NOTE    CHIEF COMPLAINT:  ESRD    HPI:  Tolerated HD well yesterday.    ROS:  no SOB    EXAM:  T(F): 98.4 (07-03-22 @ 05:13)  HR: 66 (07-03-22 @ 06:28)  BP: 165/76 (07-03-22 @ 06:28)  RR: 17 (07-03-22 @ 05:13)  SpO2: 97% (07-03-22 @ 05:13)    Conversant, in no apparent distress  Normal respiratory effort, lungs clear bilaterally  Heart RRR with no murmur, no peripheral edema         LABS                             8.8    6.98  )-----------( 240      ( 02 Jul 2022 13:00 )             26.3          07-02    136  |  98  |  19  ----------------------------<  138<H>  3.5   |  31  |  4.87<H>    Ca    8.5      02 Jul 2022 13:00        Assessment   ESRD, maintenance     Plan:  No dialysis needed today  May discharge to outpatient HD at [facility] center on TTS schedule

## 2022-07-03 NOTE — PROGRESS NOTE ADULT - SUBJECTIVE AND OBJECTIVE BOX
CHIEF COMPLAINT: improved significantly.   on room air   no chest pain   no fever  no n/v/d       PHYSICAL EXAM:    GENERAL: Obese, no acute distress   CHEST/LUNG: CTA bilaterally, no wheezing  HEART: Regular rate and rhythm; No murmurs, rubs  ABDOMEN: Soft, Nontender, Nondistended; Bowel sounds present  EXTREMITIES:  Moving all four extremities spontaneously, No clubbing, cyanosis. + improving Leg edema  NERVOUS SYSTEM:  Grossly non focal.  Psychiatry: AAO x 3, mood is appropriate       OBJECTIVE DATA:       Vital Signs Last 24 Hrs  T(C): 36.8 (2022 09:13), Max: 37.3 (2022 15:54)  T(F): 98.3 (2022 09:13), Max: 99.2 (2022 15:54)  HR: 65 (2022 09:13) (60 - 86)  BP: 165/80 (2022 09:13) (155/64 - 188/78)  BP(mean): --  RR: 18 (2022 09:13) (16 - 18)  SpO2: 98% (2022 09:13) (95% - 99%)           Daily     Daily Weight in k.4 (2022 05:13)  LABS:                        8.8    6.98  )-----------( 240      ( 2022 13:00 )             26.3             07-02    136  |  98  |  19  ----------------------------<  138<H>  3.5   |  31  |  4.87<H>    Ca    8.5      2022 13:00                         CAPILLARY BLOOD GLUCOSE      POCT Blood Glucose.: 103 mg/dL (2022 14:03)      MEDICATIONS  (STANDING):  aspirin enteric coated 81 milliGRAM(s) Oral daily  atorvastatin 80 milliGRAM(s) Oral at bedtime  calcium acetate 667 milliGRAM(s) Oral three times a day with meals  carvedilol 25 milliGRAM(s) Oral every 12 hours  chlorhexidine 2% Cloths 1 Application(s) Topical daily  cloNIDine 0.2 milliGRAM(s) Oral every 12 hours  cyanocobalamin 1000 MICROGram(s) Oral daily  heparin   Injectable 5000 Unit(s) SubCutaneous every 12 hours  hydrALAZINE 50 milliGRAM(s) Oral every 8 hours  NIFEdipine XL 60 milliGRAM(s) Oral daily  pantoprazole    Tablet 40 milliGRAM(s) Oral before breakfast  senna 2 Tablet(s) Oral at bedtime    MEDICATIONS  (PRN):  acetaminophen     Tablet .. 650 milliGRAM(s) Oral every 6 hours PRN Temp greater or equal to 38C (100.4F), Mild Pain (1 - 3)  ALBUTerol    90 MICROgram(s) HFA Inhaler 2 Puff(s) Inhalation every 6 hours PRN Shortness of Breath and/or Wheezing  aluminum hydroxide/magnesium hydroxide/simethicone Suspension 30 milliLiter(s) Oral every 4 hours PRN Dyspepsia  benzonatate 100 milliGRAM(s) Oral three times a day PRN Cough  hydrALAZINE Injectable 5 milliGRAM(s) IV Push every 6 hours PRN if SBP >170 or DBP >100  melatonin 3 milliGRAM(s) Oral at bedtime PRN Insomnia  metoclopramide Injectable 5 milliGRAM(s) IV Push every 8 hours PRN nausea/vomiting

## 2022-07-03 NOTE — PROGRESS NOTE ADULT - PROVIDER SPECIALTY LIST ADULT
Hospitalist
Internal Medicine
Nephrology
Nephrology
Hospitalist
Nephrology
Hospitalist
Hospitalist
Palliative Care
Palliative Care

## 2022-07-03 NOTE — PROGRESS NOTE ADULT - ASSESSMENT
78 yo F with Hx of HTN, glaucoma, depression, ESRD (TTS, left AVF, anuric ), presenting to the ED with daughter due to SOB after she skipped her HD x 1 week. Patient moved to [place] and has a difficulty time getting to her HD center in [place]. Initially on admission patient states she wanted hospice and to stop HD but later changed her mind.   Patient was placed on BIPAP in ER.       6/27 today was RRT for HTN with SBP 230s, diaphoretic and SOB , given hydralazine 10mg IVP, labetalol 10mg IVP, BIPAP continued. Nephrology Dr. Osborn at bedside to resume HD as patient wishes to continue HD.   6/28 s/p HD again today. home meds resumed . doing well on 4L NC   of note HD RN and floor RN have been documenting BP from LE, educated on checking BP on RUE only       Assessment and Plan:   Acute respiratory failure with hypoxia d/t fluid overload sec to missed HD  ESRD on HD TTS via LUE AVF with good thrill , patient is anuric   HTN've emergency POA BP >200s/100s  Elevated troponins, most likely demand ischemia sec to above   --BNP >99,000  --EKG sinus tachy  with peaked Twaves, widened QRS and repol abnormalities   --CT head no acute findings   --CXR with Rt sided infiltrates ??? leukocytosis normalized off abx, patient has chronic cough for years (confirmed with daughter), afebrile. will monitor off abx . procal of no value in HD patients.   --continue with BIPAP prn and qhs , titrate to NC to maintain O2 sat >92% when tolerated after HD   --Nephrology following, s/p HD x 2 , next HD tomorrow   --resumed home meds, nifedipine, coreg, clonidine   --monitor BP ,   --follow CT chest , repeat EKG in AM     Hyperkalemia POA   received lokelma in ER , calcium gluconate , HD   resolved     Normocytic anemia , AOCD   no e/o bleeding or bruising   hgb 6.9   6/27 1u PRBC   monitor CBC post transfusion   hgb 8 after 1u PRBC     Leukocytosis POA, most likely reactive, normalized now     HLD --c/w statin    H/o depression   --denies SI/HI     Preventative measures   -heparin SQ-dvt ppx  -fall precautions   DNR, DNI , palliative following , MOLST in chart       
78 yo F with Hx of HTN, glaucoma, depression, ESRD (TTS, left AVF, anuric ), presenting to the ED with daughter due to SOB after she skipped her HD x 1 week. Patient moved to [place] and has a difficulty time getting to her HD center in [place]. Initially on admission patient states she wanted hospice and to stop HD but later changed her mind.   Patient was placed on BIPAP in ER.         Assessment and Plan:   Acute respiratory failure with hypoxia d/t fluid overload sec to missed HD  ESRD on HD TTS via LUE AVF with good thrill , patient is anuric   HTN've emergency POA BP >200s/100s  Elevated troponins, most likely demand ischemia sec to above   --cont HD per renal.   --No Hypoxia on room air.   ---CT chest showed edema.   ---controlled blood pressure on current meds.   finished HD today.     Hyperkalemia POA   received lokelma in ER , calcium gluconate , HD   resolved     Normocytic anemia , AOCD   s/p blood transfusion.   no active gross bleeding.   H and H low but stable.   might benefit from EPO as outpatient. Renal following.     Leukocytosis POA, most likely reactive, normalize. No infection evidence.     HLD --c/w statin    H/o depression   --denies SI/HI     Moderate PCM: Encouraged oral intake. Appreciated nutritional consult recs.     Preventative measures   -heparin SQ-dvt ppx  -fall precautions   DNR, DNI , palliative care recs appreciated. cont outpatient HD.   discharge home tomorrow.     
78 yo F with Hx of HTN, glaucoma, depression, ESRD (TTS, left AVF, anuric ), presenting to the ED with daughter due to SOB after she skipped her HD x 1 week. Patient moved to [place] and has a difficulty time getting to her HD center in [place]. Initially on admission patient states she wanted hospice and to stop HD but later changed her mind.   Patient was placed on BIPAP in ER.         Assessment and Plan:   Acute respiratory failure with hypoxia d/t fluid overload sec to missed HD  ESRD on HD TTS via LUE AVF with good thrill , patient is anuric   HTN've emergency POA BP >200s/100s  Elevated troponins, most likely demand ischemia sec to above   --cont HD per renal.   --cont NC oxygen to keep sat > 92%.   ---CT chest showed edema.   ---cont current antihypertensives. Monitor.     Hyperkalemia POA   received lokelma in ER , calcium gluconate , HD   resolved     Normocytic anemia , AOCD   s/p blood transfusion.   no active gross bleeding.   H and H low but stable.   might benefit from EPO as outpatient. Renal following.     Leukocytosis POA, most likely reactive, normalize. No infection evidence.     HLD --c/w statin    H/o depression   --denies SI/HI     Moderate PCM: Encouraged oral intake. Appreciated nutritional consult recs.     Preventative measures   -heparin SQ-dvt ppx  -fall precautions   DNR, DNI , palliative care recs appreciated. cont outpatient HD.     
This is a 76 yo F with Hx of HTN, glaucoma, depression and ESRD (TTS), presenting to the ED with daughter due to sob and collapse at home. Patient and daughter wish for her to be on hospice care. She has intentionally skipped HD x 2w and as a result has gotten more sob 80% sPO2 on RA requiring cPAP in ED. She was recently admitted to [place] for the same reason. Pt c/o of SOB and collapsed at home. one episode of nbnb vomiting in ED. Denies LOC, vertigo, abd pain, dizziness, leg or calf swelling. Pt is anuric.  She is DNR/DNI per daughter but would take noninvasive comfort measures. labs significant for wbx 12.5, hgb 7.8, k 6.2, trop 55, pBNP 99k, creat 16    end of life care   HTN  Glaucoma  ESRD on HD  depression   hyperkalemia   anemia of renal disease   -hospice and palliative consult   -morphine prn for air hunger and ativan prn for anxiety  -Supplemental O2 for comfort (would ideally switch to NC or mask with morphine)  -resume home meds and daily labs till palliative establishes more exact goals of care   -regular renal diet   -DNR DNI   -spoke with daughter at bedside all questions answered 
76 yo F with Hx of HTN, glaucoma, depression, ESRD (TTS, left AVF, anuric ), presenting to the ED with daughter due to SOB after she skipped her HD x 1 week. Patient moved to [place] and has a difficulty time getting to her HD center in [place]. Initially on admission patient states she wanted hospice and to stop HD but later changed her mind.   Patient was placed on BIPAP in ER.         Assessment and Plan:   Acute respiratory failure with hypoxia d/t fluid overload sec to missed HD  ESRD on HD TTS via LUE AVF with good thrill , patient is anuric   HTN've emergency POA BP >200s/100s  Elevated troponins, most likely demand ischemia sec to above   --cont HD per renal.   --off oxygen.   ---CT chest showed edema.   ---adjusted hydralazine for better home blood pressure control. advised to check blood pressure at home and keep a log for PCP. discussed with daughter     Hyperkalemia POA   received lokelma in ER , calcium gluconate , HD   resolved     Normocytic anemia , AOCD   s/p blood transfusion.   no active gross bleeding.   H and H low but stable.       Leukocytosis POA, most likely reactive, normalize. No infection evidence.     HLD --c/w statin    H/o depression   --denies SI/HI     Moderate PCM: Encouraged oral intake. Appreciated nutritional consult recs.     Preventative measures   -heparin SQ-dvt ppx  -fall precautions   DNR, DNI  Ok to discharge home.   
78 yo F with Hx of HTN, glaucoma, depression, ESRD (TTS, left AVF, anuric ), presenting to the ED with daughter due to SOB after she skipped her HD x 1 week. Patient moved to [place] and has a difficulty time getting to her HD center in [place]. Initially on admission patient states she wanted hospice and to stop HD but later changed her mind.   Patient was placed on BIPAP in ER.         Assessment and Plan:   Acute respiratory failure with hypoxia d/t fluid overload sec to missed HD  ESRD on HD TTS via LUE AVF with good thrill , patient is anuric   HTN've emergency POA BP >200s/100s  Elevated troponins, most likely demand ischemia sec to above   --cont HD per renal.   --cont NC oxygen to keep sat > 92%.   ---CT chest showed edema.   ---Blood pressure still elevated. Increase hydralazine and monitor  Discussed with Jimena and Dr. Osborn>>> Next HD tomorrow and then home with outpatient HD starting Tuesday.     Hyperkalemia POA   received lokelma in ER , calcium gluconate , HD   resolved     Normocytic anemia , AOCD   s/p blood transfusion.   no active gross bleeding.   H and H low but stable.   might benefit from EPO as outpatient. Renal following.     Leukocytosis POA, most likely reactive, normalize. No infection evidence.     HLD --c/w statin    H/o depression   --denies SI/HI     Moderate PCM: Encouraged oral intake. Appreciated nutritional consult recs.     Preventative measures   -heparin SQ-dvt ppx  -fall precautions   DNR, DNI , palliative care recs appreciated. cont outpatient HD.     
76 yo F with Hx of HTN, glaucoma, depression, ESRD (TTS, left AVF, anuric ), presenting to the ED with daughter due to SOB after she skipped her HD x 1 week. Patient moved to [place] and has a difficulty time getting to her HD center in [place]. Initially on admission patient states she wanted hospice and to stop HD but later changed her mind.   Patient was placed on BIPAP in ER.       6/27 today was RRT for HTN with SBP 230s, diaphoretic and SOB , given hydralazine 10mg IVP, labetalol 10mg IVP, BIPAP continued. Nephrology Dr. Osborn at bedside to resume HD as patient wishes to continue HD.   6/28 s/p HD again today. home meds resumed . doing well on 4L NC   of note HD RN and floor RN have been documenting BP from LE, educated on checking BP on RUE only   6/29 hgb 7.1 , will order 1u PRBC   no e/o bleeding or bruising   HD today again   follow CT chest , FOBT       Assessment and Plan:   Acute respiratory failure with hypoxia d/t fluid overload sec to missed HD  ESRD on HD TTS via LUE AVF with good thrill , patient is anuric   HTN've emergency POA BP >200s/100s  Elevated troponins, most likely demand ischemia sec to above   --BNP >99,000  --EKG sinus tachy  with peaked Twaves, widened QRS and repol abnormalities   --CT head no acute findings   --CXR with Rt sided infiltrates ??? leukocytosis normalized off abx, patient has chronic cough for years (confirmed with daughter), afebrile. will monitor off abx . procal of no value in HD patients.   --continue with BIPAP prn and qhs , titrate to NC to maintain O2 sat >92% when tolerated after HD   --Nephrology following, s/p HD x 2 , next HD tomorrow   --resumed home meds, nifedipine, coreg, clonidine   --monitor BP ,   --follow CT chest      Hyperkalemia POA   received lokelma in ER , calcium gluconate , HD   resolved     Normocytic anemia , AOCD   no e/o bleeding or bruising   hgb 6.9   6/27 1u PRBC   monitor CBC post transfusion   hgb 8 after 1u PRBC   6/29n hgb 7.1 --will transfuse 1u PRBC, follow repeat CBC , FOBT   no melena or brbpr, no hemoptysis or hematemesis     Leukocytosis POA, most likely reactive, normalized now     HLD --c/w statin    H/o depression   --denies SI/HI     Preventative measures   -heparin SQ-dvt ppx  -fall precautions   DNR, DNI , palliative following , MOLST in chart       
76 yo F with Hx of HTN, glaucoma, depression, ESRD (TTS, left AVF, anuric ), presenting to the ED with daughter due to SOB after she skipped her HD x 1 week. Patient moved to [place] and has a difficulty time getting to her HD center in [place]. Initially on admission patient states she wanted hospice and to stop HD but later changed her mind.   Patient was placed on BIPAP in ER.       6/27 today was RRT for HTN with SBP 230s, diaphoretic and SOB , given hydralazine 10mg IVP, labetalol 10mg IVP, BIPAP continued. Nephrology Dr. Osborn at bedside to resume HD as patient wishes to continue HD.       Assessment and Plan:   Acute respiratory failure with hypoxia d/t fluid overload sec to missed HD  ESRD on HD TTS via LUE AVF with good thrill , patient is anuric   HTN've emergency POA BP >200s/100s  Elevated troponins, most likely demand ischemia sec to above   --BNP >99,000  --EKG sinus tachy  with peaked Twaves, widened QRS and repol abnormalities   --CT head no acute findings   --CXR with Rt sided infiltrates ??? leukocytosis normalized off abx, patient has chronic cough for years (confirmed with daughter), afebrile. will monitor off abx . procal of no value in HD patients.   --continue with BIPAP prn and qhs , titrate to NC to maintain O2 sat >92% when tolerated after HD   --Nephrology following, FOR HD today and tomorrow   --hydralazine , amlodipine   --monitor BP ,     Hyperkalemia POA   received lokelma in ER , calcium gluconate   for HD today     Normocytic anemia , AOCD   no e/o bleeding or bruising   hgb 6.9   6/27 1u PRBC   monitor CBC post transfusion     Leukocytosis POA, most likely reactive, normalized now     HLD --c/w statin    H/o depression   --denies SI/HI     Preventative measures   -heparin SQ-dvt ppx  -fall precautions   DNR, DNI , palliative following     --daughter will bring patient's home med list     
77 year old female PMH of ESRD on HD, HTN and anemia presented to the ED for shortness of breath and collapse at home.  Patient skipped HD x 1 week.  Palliative care consulted for possible hospice. 
77 year old female PMH of ESRD on HD, HTN and anemia presented to the ED for shortness of breath and collapse at home.  Patient skipped HD x 1 week.  Palliative care consulted for possible hospice.

## 2022-07-03 NOTE — DISCHARGE NOTE NURSING/CASE MANAGEMENT/SOCIAL WORK - PATIENT PORTAL LINK FT
You can access the FollowMyHealth Patient Portal offered by [facility] by registering at the following website: [email or web address]. By joining [facility]’s FollowMyHealth portal, you will also be able to view your health information using other applications (apps) compatible with our system.

## 2022-07-13 ENCOUNTER — INPATIENT (INPATIENT)
Facility: HOSPITAL | Age: 77
LOS: 8 days | Discharge: HOSPICE | End: 2022-07-22
Attending: HOSPITALIST | Admitting: HOSPITALIST

## 2022-07-13 VITALS
WEIGHT: 177.91 LBS | SYSTOLIC BLOOD PRESSURE: 172 MMHG | OXYGEN SATURATION: 94 % | HEIGHT: 61 IN | TEMPERATURE: 99 F | DIASTOLIC BLOOD PRESSURE: 78 MMHG | RESPIRATION RATE: 22 BRPM | HEART RATE: 98 BPM

## 2022-07-13 DIAGNOSIS — F32.A DEPRESSION, UNSPECIFIED: ICD-10-CM

## 2022-07-13 DIAGNOSIS — F03.90 UNSPECIFIED DEMENTIA WITHOUT BEHAVIORAL DISTURBANCE: ICD-10-CM

## 2022-07-13 DIAGNOSIS — H40.9 UNSPECIFIED GLAUCOMA: ICD-10-CM

## 2022-07-13 DIAGNOSIS — E44.1 MILD PROTEIN-CALORIE MALNUTRITION: ICD-10-CM

## 2022-07-13 DIAGNOSIS — Z66 DO NOT RESUSCITATE: ICD-10-CM

## 2022-07-13 DIAGNOSIS — Z90.49 ACQUIRED ABSENCE OF OTHER SPECIFIED PARTS OF DIGESTIVE TRACT: Chronic | ICD-10-CM

## 2022-07-13 DIAGNOSIS — R06.02 SHORTNESS OF BREATH: ICD-10-CM

## 2022-07-13 DIAGNOSIS — J45.909 UNSPECIFIED ASTHMA, UNCOMPLICATED: ICD-10-CM

## 2022-07-13 DIAGNOSIS — E87.5 HYPERKALEMIA: ICD-10-CM

## 2022-07-13 DIAGNOSIS — F41.9 ANXIETY DISORDER, UNSPECIFIED: ICD-10-CM

## 2022-07-13 DIAGNOSIS — I12.0 HYPERTENSIVE CHRONIC KIDNEY DISEASE WITH STAGE 5 CHRONIC KIDNEY DISEASE OR END STAGE RENAL DISEASE: ICD-10-CM

## 2022-07-13 DIAGNOSIS — E78.5 HYPERLIPIDEMIA, UNSPECIFIED: ICD-10-CM

## 2022-07-13 DIAGNOSIS — N18.6 END STAGE RENAL DISEASE: ICD-10-CM

## 2022-07-13 DIAGNOSIS — Z99.2 DEPENDENCE ON RENAL DIALYSIS: ICD-10-CM

## 2022-07-13 DIAGNOSIS — D63.1 ANEMIA IN CHRONIC KIDNEY DISEASE: ICD-10-CM

## 2022-07-13 DIAGNOSIS — J96.01 ACUTE RESPIRATORY FAILURE WITH HYPOXIA: ICD-10-CM

## 2022-07-13 DIAGNOSIS — I24.8 OTHER FORMS OF ACUTE ISCHEMIC HEART DISEASE: ICD-10-CM

## 2022-07-13 DIAGNOSIS — Z96.652 PRESENCE OF LEFT ARTIFICIAL KNEE JOINT: Chronic | ICD-10-CM

## 2022-07-13 DIAGNOSIS — Z51.5 ENCOUNTER FOR PALLIATIVE CARE: ICD-10-CM

## 2022-07-13 LAB
ALBUMIN SERPL ELPH-MCNC: 3 G/DL — LOW (ref 3.3–5)
ALP SERPL-CCNC: 58 U/L — SIGNIFICANT CHANGE UP (ref 40–120)
ALT FLD-CCNC: 15 U/L — SIGNIFICANT CHANGE UP (ref 12–78)
ANION GAP SERPL CALC-SCNC: 15 MMOL/L — SIGNIFICANT CHANGE UP (ref 5–17)
AST SERPL-CCNC: 17 U/L — SIGNIFICANT CHANGE UP (ref 15–37)
BASOPHILS # BLD AUTO: 0.08 K/UL — SIGNIFICANT CHANGE UP (ref 0–0.2)
BASOPHILS NFR BLD AUTO: 0.8 % — SIGNIFICANT CHANGE UP (ref 0–2)
BILIRUB SERPL-MCNC: 0.6 MG/DL — SIGNIFICANT CHANGE UP (ref 0.2–1.2)
BUN SERPL-MCNC: 80 MG/DL — HIGH (ref 7–23)
CALCIUM SERPL-MCNC: 8.9 MG/DL — SIGNIFICANT CHANGE UP (ref 8.5–10.1)
CHLORIDE SERPL-SCNC: 102 MMOL/L — SIGNIFICANT CHANGE UP (ref 96–108)
CO2 SERPL-SCNC: 21 MMOL/L — LOW (ref 22–31)
CREAT SERPL-MCNC: 13.4 MG/DL — HIGH (ref 0.5–1.3)
EGFR: 3 ML/MIN/1.73M2 — LOW
EOSINOPHIL # BLD AUTO: 0.69 K/UL — HIGH (ref 0–0.5)
EOSINOPHIL NFR BLD AUTO: 7.2 % — HIGH (ref 0–6)
FLUAV AG NPH QL: SIGNIFICANT CHANGE UP
FLUBV AG NPH QL: SIGNIFICANT CHANGE UP
GLUCOSE SERPL-MCNC: 87 MG/DL — SIGNIFICANT CHANGE UP (ref 70–99)
HCT VFR BLD CALC: 22.8 % — LOW (ref 34.5–45)
HGB BLD-MCNC: 7.5 G/DL — LOW (ref 11.5–15.5)
IMM GRANULOCYTES NFR BLD AUTO: 0.6 % — SIGNIFICANT CHANGE UP (ref 0–1.5)
LACTATE SERPL-SCNC: 0.7 MMOL/L — SIGNIFICANT CHANGE UP (ref 0.7–2)
LYMPHOCYTES # BLD AUTO: 1.24 K/UL — SIGNIFICANT CHANGE UP (ref 1–3.3)
LYMPHOCYTES # BLD AUTO: 13 % — SIGNIFICANT CHANGE UP (ref 13–44)
MCHC RBC-ENTMCNC: 27.8 PG — SIGNIFICANT CHANGE UP (ref 27–34)
MCHC RBC-ENTMCNC: 32.9 G/DL — SIGNIFICANT CHANGE UP (ref 32–36)
MCV RBC AUTO: 84.4 FL — SIGNIFICANT CHANGE UP (ref 80–100)
MONOCYTES # BLD AUTO: 0.84 K/UL — SIGNIFICANT CHANGE UP (ref 0–0.9)
MONOCYTES NFR BLD AUTO: 8.8 % — SIGNIFICANT CHANGE UP (ref 2–14)
NEUTROPHILS # BLD AUTO: 6.62 K/UL — SIGNIFICANT CHANGE UP (ref 1.8–7.4)
NEUTROPHILS NFR BLD AUTO: 69.6 % — SIGNIFICANT CHANGE UP (ref 43–77)
NRBC # BLD: 0 /100 WBCS — SIGNIFICANT CHANGE UP (ref 0–0)
NT-PROBNP SERPL-SCNC: HIGH PG/ML (ref 0–450)
PLATELET # BLD AUTO: 245 K/UL — SIGNIFICANT CHANGE UP (ref 150–400)
POTASSIUM SERPL-MCNC: 5.2 MMOL/L — SIGNIFICANT CHANGE UP (ref 3.5–5.3)
POTASSIUM SERPL-SCNC: 5.2 MMOL/L — SIGNIFICANT CHANGE UP (ref 3.5–5.3)
PROT SERPL-MCNC: 8 GM/DL — SIGNIFICANT CHANGE UP (ref 6–8.3)
RBC # BLD: 2.7 M/UL — LOW (ref 3.8–5.2)
RBC # FLD: 15.1 % — HIGH (ref 10.3–14.5)
SARS-COV-2 RNA SPEC QL NAA+PROBE: SIGNIFICANT CHANGE UP
SODIUM SERPL-SCNC: 138 MMOL/L — SIGNIFICANT CHANGE UP (ref 135–145)
TROPONIN I, HIGH SENSITIVITY RESULT: 40.9 NG/L — SIGNIFICANT CHANGE UP
WBC # BLD: 9.53 K/UL — SIGNIFICANT CHANGE UP (ref 3.8–10.5)
WBC # FLD AUTO: 9.53 K/UL — SIGNIFICANT CHANGE UP (ref 3.8–10.5)

## 2022-07-13 PROCEDURE — 71045 X-RAY EXAM CHEST 1 VIEW: CPT | Mod: 26

## 2022-07-13 PROCEDURE — 99285 EMERGENCY DEPT VISIT HI MDM: CPT

## 2022-07-13 PROCEDURE — 93010 ELECTROCARDIOGRAM REPORT: CPT

## 2022-07-13 PROCEDURE — 70450 CT HEAD/BRAIN W/O DYE: CPT | Mod: 26,MA

## 2022-07-13 PROCEDURE — 72125 CT NECK SPINE W/O DYE: CPT | Mod: 26,MA

## 2022-07-13 PROCEDURE — 99223 1ST HOSP IP/OBS HIGH 75: CPT

## 2022-07-13 RX ORDER — ASPIRIN/CALCIUM CARB/MAGNESIUM 324 MG
81 TABLET ORAL DAILY
Refills: 0 | Status: DISCONTINUED | OUTPATIENT
Start: 2022-07-13 | End: 2022-07-22

## 2022-07-13 RX ORDER — ONDANSETRON 8 MG/1
4 TABLET, FILM COATED ORAL EVERY 8 HOURS
Refills: 0 | Status: DISCONTINUED | OUTPATIENT
Start: 2022-07-13 | End: 2022-07-22

## 2022-07-13 RX ORDER — LANOLIN ALCOHOL/MO/W.PET/CERES
3 CREAM (GRAM) TOPICAL AT BEDTIME
Refills: 0 | Status: DISCONTINUED | OUTPATIENT
Start: 2022-07-13 | End: 2022-07-22

## 2022-07-13 RX ORDER — NIFEDIPINE 30 MG
60 TABLET, EXTENDED RELEASE 24 HR ORAL DAILY
Refills: 0 | Status: DISCONTINUED | OUTPATIENT
Start: 2022-07-13 | End: 2022-07-21

## 2022-07-13 RX ORDER — CARVEDILOL PHOSPHATE 80 MG/1
25 CAPSULE, EXTENDED RELEASE ORAL
Refills: 0 | Status: DISCONTINUED | OUTPATIENT
Start: 2022-07-13 | End: 2022-07-14

## 2022-07-13 RX ORDER — FUROSEMIDE 40 MG
80 TABLET ORAL ONCE
Refills: 0 | Status: COMPLETED | OUTPATIENT
Start: 2022-07-13 | End: 2022-07-14

## 2022-07-13 RX ORDER — PREGABALIN 225 MG/1
1000 CAPSULE ORAL DAILY
Refills: 0 | Status: DISCONTINUED | OUTPATIENT
Start: 2022-07-13 | End: 2022-07-22

## 2022-07-13 RX ORDER — HYDRALAZINE HCL 50 MG
50 TABLET ORAL EVERY 8 HOURS
Refills: 0 | Status: DISCONTINUED | OUTPATIENT
Start: 2022-07-13 | End: 2022-07-22

## 2022-07-13 RX ORDER — PANTOPRAZOLE SODIUM 20 MG/1
40 TABLET, DELAYED RELEASE ORAL
Refills: 0 | Status: DISCONTINUED | OUTPATIENT
Start: 2022-07-13 | End: 2022-07-22

## 2022-07-13 RX ORDER — ATORVASTATIN CALCIUM 80 MG/1
80 TABLET, FILM COATED ORAL AT BEDTIME
Refills: 0 | Status: DISCONTINUED | OUTPATIENT
Start: 2022-07-13 | End: 2022-07-22

## 2022-07-13 RX ORDER — IPRATROPIUM/ALBUTEROL SULFATE 18-103MCG
3 AEROSOL WITH ADAPTER (GRAM) INHALATION EVERY 6 HOURS
Refills: 0 | Status: DISCONTINUED | OUTPATIENT
Start: 2022-07-13 | End: 2022-07-19

## 2022-07-13 RX ORDER — ACETAMINOPHEN 500 MG
650 TABLET ORAL EVERY 6 HOURS
Refills: 0 | Status: DISCONTINUED | OUTPATIENT
Start: 2022-07-13 | End: 2022-07-22

## 2022-07-13 RX ADMIN — Medication 3 MILLILITER(S): at 23:47

## 2022-07-13 NOTE — H&P ADULT - HISTORY OF PRESENT ILLNESS
78 y/o F wP of HTN, glaucoma, depression, CHF and ESRD (TTS) presents to the ED for sob x3-4 days.  Patient states that she missed 2 dialysis sessions due to having diarrhea and vomiting since last week.  Patient states she also has been having a headache and feeling dizzy.  Denies any chest pain or discomfort, denies any fevers, chills, cough. Per EMS patient baseline of 3L O2 at home. 78 y/o F w PMH of HTN, glaucoma, depression, on home oxygen 2L and ESRD (TTS) presents to the ED for sob x3-4 days.  Patient states that she missed her last 2 dialysis sessions due to having diarrhea and vomiting since last week.  Patient states she also has been having a headache and feeling dizzy.  Denies any focal motor deficits or slurred speech. She fell yesterday without LOC or hitting her head.  Denies any chest pain or discomfort, denies any fevers, chills, cough or headache. Per EMS patient baseline of 3L O2 at home.  Upon arrival in ED, she has been put on BiPAP due to hypoxia.    CXR shows fluid overload, but better than her last CXR per ED reading. Official report pending.    Significant labs: K5.2, HCO3 21, Cr 13.4, Hb 7.5, BNP 30031.    EK bpm, no acute ST-T changes.

## 2022-07-13 NOTE — ED ADULT NURSE NOTE - ED STAT RN HANDOFF DETAILS
Report received from PEÑA Cesar at 7pm. Assessment available on [facility]. Pt A&Ox3, resting in bed, maintaining on BIPAP, family at bedside. Pending Ct scan. will continue to monitor.

## 2022-07-13 NOTE — ED PROVIDER NOTE - NS ED ATTENDING STATEMENT MOD
This was a shared visit with the DIOGO. I reviewed and verified the documentation and independently performed the documented:

## 2022-07-13 NOTE — ED PROVIDER NOTE - OBJECTIVE STATEMENT
76 y/o F wP of HTN, glaucoma, depression, CHF and ESRD (TTS) presents to the ED for sob x3-4 days.  Patient states that she missed 2 dialysis sessions due to having diarrhea and vomiting since last week.  Patient states she also has been having a headache and feeling dizzy.  Denies any chest pain or discomfort, denies any fevers, chills, cough. Per EMS patient baseline of 3L O2 at home.

## 2022-07-13 NOTE — ED PROVIDER NOTE - PROGRESS NOTE DETAILS
Patient resting comfortably, no acute distress.  Patient signed out to Dr. Rodriguez, hospitalist including incidental findings on CT.

## 2022-07-13 NOTE — ED PROVIDER NOTE - ATTENDING APP SHARED VISIT CONTRIBUTION OF CARE
78 y/o F with PMH HTN, glaucoma, depression, CHF and ESRD (TTS), on 3L O2 NC at baseline presents to the ED for SOB. Patient reportedly missed her last 2 dialysis sessions because she was feeling unwell with diarrhea and vomiting last week. She no longer wants to go to dialysis and therefore often misses sessions. She was recently admitted earlier this month for similar. Now endorsing feeling SOB x3-4 days. No CP, fever, chills, N/V. Daughter is trying to get patient into palliative care/hospice to discuss options.    ROS:  CONST: no fevers, no chills  EYES: no pain, no vision changes  ENT: no sore throat, no ear pain, no change in hearing  CV: no chest pain, no leg swelling  RESP: + shortness of breath, no cough  ABD: no abdominal pain, no nausea, + vomiting, + diarrhea  : no dysuria, no flank pain, no hematuria  MSK: no back pain, no extremity pain  NEURO: no headache or additional neurologic complaints  HEME: no easy bleeding  SKIN:  no rash    GENERAL: Awake, alert, +mild respiratory distress  HEENT: NC/AT, moist mucous membranes  LUNGS: crackles b/l lung fields, tachypneic   CARDIAC: RRR, no m/r/g  ABDOMEN: Soft, normal BS, non tender, non distended, no rebound, no guarding  EXT: no calf tenderness, 2+ DP pulses bilaterally, no deformities.  NEURO: A&Ox2-3. Moving all extremities.  SKIN: Warm and dry. No rash.  PSYCH: Normal affect.    MDM:  78 y/o F presenting to the ED with respiratory distress in setting of missed dialysis. Patient is tachypneic upon arrival. Will initiate BIPAP in setting of likely fluid overload. Plan for labs, CXR, EKG. She will need admission for dialysis.

## 2022-07-13 NOTE — ED ADULT TRIAGE NOTE - CHIEF COMPLAINT QUOTE
c/o shortness of breath over past few days worse today missed dialysis sat and tues last dialysis last thurs uses home o2 3l/min hx esrd asthma htn diabetes chf denies chest pain

## 2022-07-13 NOTE — ED PROVIDER NOTE - NSICDXPASTMEDICALHX_GEN_ALL_CORE_FT
PAST MEDICAL HISTORY:  Asthma     DM (diabetes mellitus)     ESRD on dialysis Tu, Thurs, Sat    HTN (hypertension)

## 2022-07-13 NOTE — H&P ADULT - NSHPLABSRESULTS_GEN_ALL_CORE
LABS:                        7.5    9.53  )-----------( 245      ( 13 Jul 2022 15:57 )             22.8     07-13    138  |  102  |  80<H>  ----------------------------<  87  5.2   |  21<L>  |  13.40<H>    Ca    8.9      13 Jul 2022 15:57    TPro  8.0  /  Alb  3.0<L>  /  TBili  0.6  /  DBili  x   /  AST  17  /  ALT  15  /  AlkPhos  58  07-13

## 2022-07-13 NOTE — ED ADULT NURSE NOTE - ED STAT RN HANDOFF DETAILS 2
Report given to receiving Tele RN Jayde, pts history, current condition and reason for admission discussed, safety concerns addressed and reviewed, pt currently in stable condition, IV flushes for patency and site shows no signs or symptoms of infiltrate, dressing is clean dry and intact, pt is aware of plan of care. Pt education deemed successful at time of report after patient demonstrates successful teach back for proficiency. Pt A&Ox3, maintaining on BIPAP, appears in NAD.

## 2022-07-13 NOTE — H&P ADULT - NSHPPHYSICALEXAM_GEN_ALL_CORE
PHYSICAL EXAM:  GENERAL: NAD, lying in bed comfortably  HEAD:  Atraumatic, Normocephalic  EYES: EOMI, PERRLA, conjunctiva and sclera clear  ENT: Moist mucous membranes  NECK: Supple, No JVD  CHEST/LUNG: B/l few crepitations present.  HEART: Regular rate and rhythm; No murmurs, rubs, or gallops  ABDOMEN: Bowel sounds present; Soft, Nontender, Nondistended. No hepatomegaly  EXTREMITIES:  2+ Peripheral Pulses, brisk capillary refill. No clubbing, cyanosis. B/l 1 +pitting edema present.  NERVOUS SYSTEM:  Alert & Oriented X3, speech clear. No deficits   MSK: FROM all 4 extremities, full and equal strength  SKIN: No rashes or lesions

## 2022-07-13 NOTE — ED PROVIDER NOTE - CLINICAL SUMMARY MEDICAL DECISION MAKING FREE TEXT BOX
76 y/o F wPMH of HTN, glaucoma, depression, CHF and ESRD (TTS) presents to the ED for sob x3-4 days, worse today.  Upon presentation, patient tachypneic, uncomfortable appearing, patient placed on bipap.  Will check labs, EKG, likely admission.

## 2022-07-13 NOTE — ED PROVIDER NOTE - PHYSICAL EXAMINATION
GEN:    tachypnic, able to talk 3-5 word sentences, AOx3  EYES:   PERRL, extra-occular movements intact  HEENT:   airway patent, moist mucosal membranes, uvula midline  CV:  RRR, Pulses- Radial: 2+ bilateral and equal  RESP:   clear to auscultation bilaterally, tachypneic.   ABD:   soft, non tender, no guarding  :   no cva tenderness  MSK:   no musculoskeletal tenderness, 5/5 strength, moving all extremities  SKIN:   dry, intact, no rash  NEURO:   AOx3, no focal weakness or loss of sensation, GCS 15  PSYCH: calm, cooperative, no apparent risk to self and others

## 2022-07-13 NOTE — ED ADULT NURSE NOTE - OBJECTIVE STATEMENT
as per patient " I missed Diaylsis last Thursday( wasn't feeling well, shortness of breath since then. Noted to have upper left arm AV fistula( bruit/thrill present).

## 2022-07-13 NOTE — H&P ADULT - NSICDXPASTMEDICALHX_GEN_ALL_CORE_FT
PAST MEDICAL HISTORY:  Asthma     DM (diabetes mellitus)     Dyslipidemia     ESRD on dialysis Tu, Thurs, Sat    HTN (hypertension)     Noncompliance with treatment     On home oxygen therapy     Severe anemia

## 2022-07-13 NOTE — H&P ADULT - ASSESSMENT
76 y/o F wP of HTN, glaucoma, depression, CHF and ESRD (TTS) presents to the ED for sob x3-4 days.  Patient states that she missed 2 dialysis sessions due to having diarrhea and vomiting since last week.  Patient states she also has been having a headache and feeling dizzy.  Denies any chest pain or discomfort, denies any fevers, chills, cough. Per EMS patient baseline of 3L O2 at home. 78 y/o F w PMH of HTN, glaucoma, depression, on home oxygen 2L and ESRD (TTS) presents to the ED for sob x3-4 days.  Patient states that she missed her last 2 dialysis sessions due to having diarrhea and vomiting since last week.  Patient states she also has been having a headache and feeling dizzy.  Denies any focal motor deficits or slurred speech. She fell yesterday without LOC or hitting her head.  Denies any chest pain or discomfort, denies any fevers, chills, cough or headache. Per EMS patient baseline of 3L O2 at home.  Upon arrival in ED, she has been put on BiPAP due to hypoxia.  -CXR shows fluid overload, but better than her last CXR per ED reading. Official report pending.  -Significant labs: K5.2, HCO3 21, Cr 13.4, Hb 7.5, BNP 20026.  -EK bpm, no acute ST-T changes.    1. Acute on chronic respiratory failure due to fluid overload in the setting of missed HD.  Requiring BiPAP support now, will try to wean it off.  Lasix 80 mg IV x 1 dose now.  Duoneb neb q6h.  Nephrology consult requested (Dr Osborn) via call center: patient should get HD tomorrow morning.    2. ESRD on HD on TThS.  She has missed her last 2 runs of HD.  Management as outlined above in #1.    3. Abnormal CT head s/p fall today without LOC.  CT head: Slight decreased attenuation in left frontal brain parenchyma. CT C-spine unremarkable.  MRI brain without contrast tomorrow for further characterization.    4. HTN and Dyslipidemia.  Continue her ASA, Coreg, Clonidine, Hydralazine, Nifedipine, and Atorvastatin.    5. Severe anemia due to ESRD.  Hb 7.5, close to her baseline.  Denies any overt bleeding.  Continue to monitor H/H daily.    6. DVT prophylaxis: Heparin sq.    CODE STATUS: DNI/DNR. D/w the patient, and she confirmed DNI/DNR status.

## 2022-07-14 LAB
ANION GAP SERPL CALC-SCNC: 16 MMOL/L — SIGNIFICANT CHANGE UP (ref 5–17)
BASE EXCESS BLDA CALC-SCNC: -8.3 MMOL/L — LOW (ref -2–3)
BLOOD GAS COMMENTS ARTERIAL: SIGNIFICANT CHANGE UP
BUN SERPL-MCNC: 87 MG/DL — HIGH (ref 7–23)
CALCIUM SERPL-MCNC: 9.4 MG/DL — SIGNIFICANT CHANGE UP (ref 8.5–10.1)
CHLORIDE SERPL-SCNC: 100 MMOL/L — SIGNIFICANT CHANGE UP (ref 96–108)
CK MB BLD-MCNC: 1 % — SIGNIFICANT CHANGE UP (ref 0–3.5)
CK MB CFR SERPL CALC: 1.1 NG/ML — SIGNIFICANT CHANGE UP (ref 0.5–3.6)
CK SERPL-CCNC: 105 U/L — SIGNIFICANT CHANGE UP (ref 26–192)
CO2 BLDA-SCNC: 22 MMOL/L — SIGNIFICANT CHANGE UP (ref 19–24)
CO2 SERPL-SCNC: 21 MMOL/L — LOW (ref 22–31)
CREAT SERPL-MCNC: 14 MG/DL — HIGH (ref 0.5–1.3)
EGFR: 2 ML/MIN/1.73M2 — LOW
GAS PNL BLDA: SIGNIFICANT CHANGE UP
GAS PNL BLDA: SIGNIFICANT CHANGE UP
GLUCOSE BLDC GLUCOMTR-MCNC: 122 MG/DL — HIGH (ref 70–99)
GLUCOSE SERPL-MCNC: 117 MG/DL — HIGH (ref 70–99)
HCO3 BLDA-SCNC: 20 MMOL/L — LOW (ref 21–28)
HCT VFR BLD CALC: 27.2 % — LOW (ref 34.5–45)
HGB BLD-MCNC: 8.8 G/DL — LOW (ref 11.5–15.5)
HOROWITZ INDEX BLDA+IHG-RTO: 100 — SIGNIFICANT CHANGE UP
MAGNESIUM SERPL-MCNC: 2.6 MG/DL — SIGNIFICANT CHANGE UP (ref 1.6–2.6)
MCHC RBC-ENTMCNC: 27.8 PG — SIGNIFICANT CHANGE UP (ref 27–34)
MCHC RBC-ENTMCNC: 32.4 G/DL — SIGNIFICANT CHANGE UP (ref 32–36)
MCV RBC AUTO: 85.8 FL — SIGNIFICANT CHANGE UP (ref 80–100)
NRBC # BLD: 0 /100 WBCS — SIGNIFICANT CHANGE UP (ref 0–0)
NT-PROBNP SERPL-SCNC: HIGH PG/ML (ref 0–450)
PCO2 BLDA: 57 MMHG — HIGH (ref 32–46)
PH BLDA: 7.16 — CRITICAL LOW (ref 7.35–7.45)
PLATELET # BLD AUTO: 339 K/UL — SIGNIFICANT CHANGE UP (ref 150–400)
PO2 BLDA: 74 MMHG — LOW (ref 83–108)
POTASSIUM SERPL-MCNC: 5.2 MMOL/L — SIGNIFICANT CHANGE UP (ref 3.5–5.3)
POTASSIUM SERPL-SCNC: 5.2 MMOL/L — SIGNIFICANT CHANGE UP (ref 3.5–5.3)
RBC # BLD: 3.17 M/UL — LOW (ref 3.8–5.2)
RBC # FLD: 15.3 % — HIGH (ref 10.3–14.5)
SAO2 % BLDA: 94 % — SIGNIFICANT CHANGE UP (ref 94–98)
SODIUM SERPL-SCNC: 137 MMOL/L — SIGNIFICANT CHANGE UP (ref 135–145)
TROPONIN I, HIGH SENSITIVITY RESULT: 141.3 NG/L — HIGH
TROPONIN I, HIGH SENSITIVITY RESULT: 54.2 NG/L — HIGH
WBC # BLD: 16.98 K/UL — HIGH (ref 3.8–10.5)
WBC # FLD AUTO: 16.98 K/UL — HIGH (ref 3.8–10.5)

## 2022-07-14 PROCEDURE — 71045 X-RAY EXAM CHEST 1 VIEW: CPT | Mod: 26

## 2022-07-14 PROCEDURE — 99291 CRITICAL CARE FIRST HOUR: CPT

## 2022-07-14 RX ORDER — METOPROLOL TARTRATE 50 MG
5 TABLET ORAL ONCE
Refills: 0 | Status: COMPLETED | OUTPATIENT
Start: 2022-07-14 | End: 2022-07-14

## 2022-07-14 RX ORDER — DIPHENHYDRAMINE HCL 50 MG
25 CAPSULE ORAL ONCE
Refills: 0 | Status: COMPLETED | OUTPATIENT
Start: 2022-07-14 | End: 2022-07-14

## 2022-07-14 RX ORDER — CARVEDILOL PHOSPHATE 80 MG/1
50 CAPSULE, EXTENDED RELEASE ORAL ONCE
Refills: 0 | Status: DISCONTINUED | OUTPATIENT
Start: 2022-07-14 | End: 2022-07-14

## 2022-07-14 RX ORDER — CHLORHEXIDINE GLUCONATE 213 G/1000ML
1 SOLUTION TOPICAL
Refills: 0 | Status: DISCONTINUED | OUTPATIENT
Start: 2022-07-14 | End: 2022-07-22

## 2022-07-14 RX ORDER — HYDRALAZINE HCL 50 MG
10 TABLET ORAL ONCE
Refills: 0 | Status: COMPLETED | OUTPATIENT
Start: 2022-07-14 | End: 2022-07-14

## 2022-07-14 RX ORDER — DEXMEDETOMIDINE HYDROCHLORIDE IN 0.9% SODIUM CHLORIDE 4 UG/ML
0.2 INJECTION INTRAVENOUS
Qty: 200 | Refills: 0 | Status: DISCONTINUED | OUTPATIENT
Start: 2022-07-14 | End: 2022-07-14

## 2022-07-14 RX ORDER — PIPERACILLIN AND TAZOBACTAM 4; .5 G/20ML; G/20ML
3.38 INJECTION, POWDER, LYOPHILIZED, FOR SOLUTION INTRAVENOUS EVERY 12 HOURS
Refills: 0 | Status: DISCONTINUED | OUTPATIENT
Start: 2022-07-14 | End: 2022-07-16

## 2022-07-14 RX ORDER — CARVEDILOL PHOSPHATE 80 MG/1
25 CAPSULE, EXTENDED RELEASE ORAL EVERY 12 HOURS
Refills: 0 | Status: DISCONTINUED | OUTPATIENT
Start: 2022-07-14 | End: 2022-07-22

## 2022-07-14 RX ORDER — PIPERACILLIN AND TAZOBACTAM 4; .5 G/20ML; G/20ML
3.38 INJECTION, POWDER, LYOPHILIZED, FOR SOLUTION INTRAVENOUS ONCE
Refills: 0 | Status: COMPLETED | OUTPATIENT
Start: 2022-07-14 | End: 2022-07-14

## 2022-07-14 RX ADMIN — Medication 5 MILLIGRAM(S): at 00:37

## 2022-07-14 RX ADMIN — Medication 1 MILLIGRAM(S): at 03:53

## 2022-07-14 RX ADMIN — PIPERACILLIN AND TAZOBACTAM 25 GRAM(S): 4; .5 INJECTION, POWDER, LYOPHILIZED, FOR SOLUTION INTRAVENOUS at 17:50

## 2022-07-14 RX ADMIN — Medication 0.2 MILLIGRAM(S): at 17:50

## 2022-07-14 RX ADMIN — DEXMEDETOMIDINE HYDROCHLORIDE IN 0.9% SODIUM CHLORIDE 4.04 MICROGRAM(S)/KG/HR: 4 INJECTION INTRAVENOUS at 13:15

## 2022-07-14 RX ADMIN — CARVEDILOL PHOSPHATE 25 MILLIGRAM(S): 80 CAPSULE, EXTENDED RELEASE ORAL at 17:50

## 2022-07-14 RX ADMIN — Medication 3 MILLILITER(S): at 05:50

## 2022-07-14 RX ADMIN — ATORVASTATIN CALCIUM 80 MILLIGRAM(S): 80 TABLET, FILM COATED ORAL at 21:19

## 2022-07-14 RX ADMIN — Medication 50 MILLIGRAM(S): at 21:19

## 2022-07-14 RX ADMIN — Medication 50 MILLIGRAM(S): at 16:06

## 2022-07-14 RX ADMIN — Medication 80 MILLIGRAM(S): at 03:43

## 2022-07-14 RX ADMIN — PREGABALIN 1000 MICROGRAM(S): 225 CAPSULE ORAL at 16:06

## 2022-07-14 RX ADMIN — CHLORHEXIDINE GLUCONATE 1 APPLICATION(S): 213 SOLUTION TOPICAL at 04:51

## 2022-07-14 RX ADMIN — DEXMEDETOMIDINE HYDROCHLORIDE IN 0.9% SODIUM CHLORIDE 4.04 MICROGRAM(S)/KG/HR: 4 INJECTION INTRAVENOUS at 05:05

## 2022-07-14 RX ADMIN — Medication 81 MILLIGRAM(S): at 16:06

## 2022-07-14 RX ADMIN — Medication 3 MILLILITER(S): at 23:50

## 2022-07-14 RX ADMIN — Medication 3 MILLILITER(S): at 11:11

## 2022-07-14 RX ADMIN — DEXMEDETOMIDINE HYDROCHLORIDE IN 0.9% SODIUM CHLORIDE 4.04 MICROGRAM(S)/KG/HR: 4 INJECTION INTRAVENOUS at 10:24

## 2022-07-14 RX ADMIN — Medication 3 MILLIGRAM(S): at 02:15

## 2022-07-14 RX ADMIN — Medication 3 MILLILITER(S): at 17:29

## 2022-07-14 RX ADMIN — Medication 10 MILLIGRAM(S): at 04:43

## 2022-07-14 RX ADMIN — PIPERACILLIN AND TAZOBACTAM 25 GRAM(S): 4; .5 INJECTION, POWDER, LYOPHILIZED, FOR SOLUTION INTRAVENOUS at 06:10

## 2022-07-14 RX ADMIN — Medication 25 MILLIGRAM(S): at 03:28

## 2022-07-14 NOTE — PATIENT PROFILE ADULT - NSPROGENSOURCEINFO_GEN_A_NUR
patient Implemented All Universal Safety Interventions:  [place] to call system. Call bell, personal items and telephone within reach. Instruct patient to call for assistance. Room bathroom lighting operational. Non-slip footwear when patient is off stretcher. Physically safe environment: no spills, clutter or unnecessary equipment. Stretcher in lowest position, wheels locked, appropriate side rails in place.

## 2022-07-14 NOTE — PROVIDER CONTACT NOTE (EICU) - RECOMMENDATIONS
Case discussed with ICU PA  THERESA. Pt reportedly able to tolerate as mental status appropriate and pt responsive  Pt is con coreg 25 mg bid, clonidine 0.2 mg bid, hydralazine 50 mg po tid, coreg 25 mg bid.   Recommended increased dose of coreg now at 50 mg. HR >100 and hydralazine 10 mg iv q6 prn   Check pro BNP, EKG, troponin x3. r/o ischemia  CXR with b/l infiltrates, worsened at the bases  Cont BIPAP 14/7 60%, titrating down FiO2 as tolerated and repeat abg in am  HD if pt willing, C will have it    On evaluation in ICU, BP improved to 177/85  Case discussed with ICU PA  THERESA. Pt reportedly able to tolerate as mental status appropriate and pt responsive  Pt is con coreg 25 mg bid, clonidine 0.2 mg bid, hydralazine 50 mg po tid, coreg 25 mg bid.   Recommended increased dose of coreg now at 50 mg. HR >100 and hydralazine 10 mg iv q6 prn   Check pro BNP, EKG, troponin x3. r/o ischemia  CXR with b/l infiltrates, worsened at the bases. ?RML infiltrate with leukocytosis. zosyn pending cx, renal dosing  Cont BIPAP 14/7 60%, titrating down FiO2 as tolerated and repeat abg in am  HD if pt willing, C will have it    On evaluation in ICU, BP improved to 177/85

## 2022-07-14 NOTE — CONSULT NOTE ADULT - CRITICAL CARE ATTENDING COMMENT
Pt is a 76 yo BF  with h/o asthma on home o2, HTN ,CKD stage 5, glaucoma, depression and DNR/DNI status with frequent hospitalizations admitted to [facility] 2 missed HD sessions last 2 dialysis sessions due to having diarrhea and vomiting since last week and SOB; acute on chronic hypoxic resp failure 2 to fluid overload. While on the floor pt was agitated and non-compliant with NIV. Pt was given Benadryl and Ativan. During RRT pt noted to be hypertensive. ICU dx: 1) acute on chronic hypoxic resp failure 2 to fluid overload 2) HTN 2 to fluid overload 3) Agitation    Resp: Trial off BiPAP post HD  CVS:  HEME:  FEN:  GI:  Neuro: Pt is a 76 yo BF  with h/o asthma on home o2, HTN ,CKD stage 5, glaucoma, depression and DNR/DNI status with frequent hospitalizations admitted to [facility] 2 missed HD sessions last 2 dialysis sessions due to having diarrhea and vomiting since last week and SOB; acute on chronic hypoxic resp failure 2 to fluid overload. While on the floor pt was agitated and non-compliant with NIV. Pt was given Benadryl and Ativan. During RRT pt noted to be hypertensive. ICU dx: 1) acute on chronic hypoxic resp failure 2 to fluid overload 2) HTN 2 to fluid overload 3) Agitation    Resp: Trial off BiPAP post HD; if resp status remains stable possible transfer to [facility]  ID: Repeat CXR if improved will dc Zosyn  CVS: Resume pt's antiHTN meds  Heme: DVT prophylaxis   FEN: NPO except for meds  Renal: HD as per Renal  Neuro/Psych: Wean off Precedex/ Avoid Benzos  Social: Pt is DNR/DNI Pt is a 76 yo BF  with h/o asthma on home o2, HTN ,CKD stage 5, glaucoma, depression and DNR/DNI status with frequent hospitalizations admitted to [facility] 2 missed HD sessions last 2 dialysis sessions due to having diarrhea and vomiting since last week and SOB; acute on chronic hypoxic resp failure 2 to fluid overload. While on the floor pt was agitated and non-compliant with NIV. Pt was given Benadryl and Ativan. During RRT pt noted to be hypertensive. ICU dx: 1) acute on chronic hypoxic resp failure 2 to fluid overload 2) Uncontrolled HTN 2 to fluid overload 3) Agitation    Resp: Trial off BiPAP post HD; if resp status remains stable possible transfer to [facility]  ID: Repeat CXR if improved will dc Zosyn  CVS: Resume pt's antiHTN meds  Heme: DVT prophylaxis   FEN: NPO except for meds  Renal: HD as per Renal  Neuro/Psych: Wean off Precedex/ Avoid Benzos  Social: Pt is DNR/DNI    CCT: 40 min not in conjunction with the NP Pt is a 76 yo BF  with h/o asthma on home o2, HTN ,CKD stage 5, glaucoma, depression and DNR/DNI status with frequent hospitalizations admitted to [facility] 2 missed HD sessions last 2 dialysis sessions due to having diarrhea and vomiting since last week and SOB; acute on chronic hypoxic resp failure 2 to fluid overload. While on the floor pt was agitated and non-compliant with NIV. Pt was given Benadryl and Ativan. During RRT pt noted to be hypertensive. ICU dx: 1) acute on chronic hypoxic resp failure 2 to fluid overload 2) Uncontrolled HTN 2 to fluid overload 3) Agitation    Resp: Trial off BiPAP post HD; if resp status remains stable possible transfer to [facility]  ID: Repeat CXR if improved will dc Zosyn  CVS: Resume pt's antiHTN meds/ Pt with probable demand ischemia  Heme: DVT prophylaxis   FEN: NPO except for meds  Renal: HD as per Renal  Neuro/Psych: Wean off Precedex/ Avoid Benzos  Social: Pt is DNR/DNI    CCT: 40 min not in conjunction with the NP Pt is a 78 yo BF  with h/o asthma on home o2, HTN ,CKD stage 5, glaucoma, depression and DNR/DNI status with frequent hospitalizations admitted to [facility] 2 missed HD sessions last 2 dialysis sessions due to having diarrhea and vomiting since last week and SOB; acute on chronic hypoxic resp failure 2 to fluid overload. While on the floor pt was agitated and non-compliant with NIV. Pt was given Benadryl and Ativan. During RRT pt noted to be hypertensive. ICU dx: 1) acute on chronic hypoxic resp failure 2 to fluid overload 2) Uncontrolled HTN 2 to fluid overload 3) Agitation    Resp: Trial off BiPAP post HD; if resp status remains stable transfer to [facility]  ID: Repeat CXR if improved will dc Zosyn  CVS: Resume pt's antiHTN meds/ Pt with probable demand ischemia  Heme: DVT prophylaxis   FEN: NPO except for meds  Renal: HD as per Renal  Neuro/Psych: Wean off Precedex/ Avoid Benzos  Social: Pt is DNR/DNI    CCT: 40 min not in conjunction with the NP

## 2022-07-14 NOTE — PROGRESS NOTE ADULT - SUBJECTIVE AND OBJECTIVE BOX
INTERVAL HPI/OVERNIGHT EVENTS:    SUBJECTIVE: Patient seen and examined at bedside.       VITAL SIGNS:  ICU Vital Signs Last 24 Hrs  T(C): 36.8 (14 Jul 2022 04:35), Max: 37.4 (13 Jul 2022 15:31)  T(F): 98.3 (14 Jul 2022 04:35), Max: 99.4 (13 Jul 2022 15:31)  HR: 73 (14 Jul 2022 07:00) (73 - 129)  BP: 139/64 (14 Jul 2022 07:00) (139/64 - 195/93)  BP(mean): 86 (14 Jul 2022 07:00) (86 - 172)  ABP: --  ABP(mean): --  RR: 21 (14 Jul 2022 07:00) (12 - 36)  SpO2: 100% (14 Jul 2022 07:00) (91% - 100%)    O2 Parameters below as of 14 Jul 2022 06:39  Patient On (Oxygen Delivery Method): BiPAP/CPAP,60 %             Plateau pressure:   P/F ratio:     07-13 @ 07:01  -  07-14 @ 07:00  --------------------------------------------------------  IN: 129.5 mL / OUT: 0 mL / NET: 129.5 mL      CAPILLARY BLOOD GLUCOSE      POCT Blood Glucose.: 122 mg/dL (14 Jul 2022 03:36)    ECG:    PHYSICAL EXAM:    General:   HEENT:   Neck:   Respiratory:   Cardiovascular:   Abdomen:   Extremities:  Neurological:    MEDICATIONS:  MEDICATIONS  (STANDING):  albuterol/ipratropium for Nebulization 3 milliLiter(s) Nebulizer every 6 hours  aspirin  chewable 81 milliGRAM(s) Oral daily  atorvastatin 80 milliGRAM(s) Oral at bedtime  carvedilol 50 milliGRAM(s) Oral once  carvedilol Oral Tab/Cap - Peds 25 milliGRAM(s) Oral two times a day  chlorhexidine 2% Cloths 1 Application(s) Topical <User Schedule>  cloNIDine 0.2 milliGRAM(s) Oral two times a day  cyanocobalamin 1000 MICROGram(s) Oral daily  dexMEDEtomidine Infusion 0.2 MICROgram(s)/kG/Hr (4.04 mL/Hr) IV Continuous <Continuous>  hydrALAZINE 50 milliGRAM(s) Oral every 8 hours  NIFEdipine XL 60 milliGRAM(s) Oral daily  pantoprazole    Tablet 40 milliGRAM(s) Oral before breakfast  piperacillin/tazobactam IVPB.. 3.375 Gram(s) IV Intermittent every 12 hours    MEDICATIONS  (PRN):  acetaminophen     Tablet .. 650 milliGRAM(s) Oral every 6 hours PRN Temp greater or equal to 38C (100.4F), Mild Pain (1 - 3)  aluminum hydroxide/magnesium hydroxide/simethicone Suspension 30 milliLiter(s) Oral every 4 hours PRN Dyspepsia  melatonin 3 milliGRAM(s) Oral at bedtime PRN Insomnia  ondansetron Injectable 4 milliGRAM(s) IV Push every 8 hours PRN Nausea and/or Vomiting      ALLERGIES:  Allergies    No Known Drug Allergies  shellfish (Swelling)    Intolerances        LABS:                        8.8    16.98 )-----------( 339      ( 14 Jul 2022 03:59 )             27.2     07-14    137  |  100  |  87<H>  ----------------------------<  117<H>  5.2   |  21<L>  |  14.00<H>    Ca    9.4      14 Jul 2022 03:59  Mg     2.6     07-14    TPro  8.0  /  Alb  3.0<L>  /  TBili  0.6  /  DBili  x   /  AST  17  /  ALT  15  /  AlkPhos  58  07-13          RADIOLOGY & ADDITIONAL TESTS: Reviewed.   INTERVAL HPI/OVERNIGHT EVENTS: RRT called for patient being agitated and hypertensive. Patient was admitted to ICU for further management.     SUBJECTIVE: Patient seen and examined at bedside.       VITAL SIGNS:  ICU Vital Signs Last 24 Hrs  T(C): 36.8 (14 Jul 2022 04:35), Max: 37.4 (13 Jul 2022 15:31)  T(F): 98.3 (14 Jul 2022 04:35), Max: 99.4 (13 Jul 2022 15:31)  HR: 73 (14 Jul 2022 07:00) (73 - 129)  BP: 139/64 (14 Jul 2022 07:00) (139/64 - 195/93)  BP(mean): 86 (14 Jul 2022 07:00) (86 - 172)  ABP: --  ABP(mean): --  RR: 21 (14 Jul 2022 07:00) (12 - 36)  SpO2: 100% (14 Jul 2022 07:00) (91% - 100%)    O2 Parameters below as of 14 Jul 2022 06:39  Patient On (Oxygen Delivery Method): BiPAP/CPAP,60 %             Plateau pressure:   P/F ratio:     07-13 @ 07:01  -  07-14 @ 07:00  --------------------------------------------------------  IN: 129.5 mL / OUT: 0 mL / NET: 129.5 mL      CAPILLARY BLOOD GLUCOSE      POCT Blood Glucose.: 122 mg/dL (14 Jul 2022 03:36)    ECG:    PHYSICAL EXAM:    Gen: Patient is well-appearing, NAD  HEENT: NCAT, normal conjunctiva, tongue midline, oral mucosa moist  Lung: CTAB, no respiratory distress, no wheezes/rhonchi/rales B/L, speaking in full sentences  CV: RRR, no murmurs, rubs or gallops, distal pulses 2+ b/l  Abd: soft, NT, ND, no guarding, no rigidity, no rebound tenderness  MSK: no visible deformities, ROM normal in UE/LE  Neuro: No focal sensory or motor deficits  Skin: Warm, well perfused, no leg swelling  Psych: normal affect, calm      MEDICATIONS:  MEDICATIONS  (STANDING):  albuterol/ipratropium for Nebulization 3 milliLiter(s) Nebulizer every 6 hours  aspirin  chewable 81 milliGRAM(s) Oral daily  atorvastatin 80 milliGRAM(s) Oral at bedtime  carvedilol 50 milliGRAM(s) Oral once  carvedilol Oral Tab/Cap - Peds 25 milliGRAM(s) Oral two times a day  chlorhexidine 2% Cloths 1 Application(s) Topical <User Schedule>  cloNIDine 0.2 milliGRAM(s) Oral two times a day  cyanocobalamin 1000 MICROGram(s) Oral daily  dexMEDEtomidine Infusion 0.2 MICROgram(s)/kG/Hr (4.04 mL/Hr) IV Continuous <Continuous>  hydrALAZINE 50 milliGRAM(s) Oral every 8 hours  NIFEdipine XL 60 milliGRAM(s) Oral daily  pantoprazole    Tablet 40 milliGRAM(s) Oral before breakfast  piperacillin/tazobactam IVPB.. 3.375 Gram(s) IV Intermittent every 12 hours    MEDICATIONS  (PRN):  acetaminophen     Tablet .. 650 milliGRAM(s) Oral every 6 hours PRN Temp greater or equal to 38C (100.4F), Mild Pain (1 - 3)  aluminum hydroxide/magnesium hydroxide/simethicone Suspension 30 milliLiter(s) Oral every 4 hours PRN Dyspepsia  melatonin 3 milliGRAM(s) Oral at bedtime PRN Insomnia  ondansetron Injectable 4 milliGRAM(s) IV Push every 8 hours PRN Nausea and/or Vomiting      ALLERGIES:  Allergies    No Known Drug Allergies  shellfish (Swelling)    Intolerances        LABS:                        8.8    16.98 )-----------( 339      ( 14 Jul 2022 03:59 )             27.2     07-14    137  |  100  |  87<H>  ----------------------------<  117<H>  5.2   |  21<L>  |  14.00<H>    Ca    9.4      14 Jul 2022 03:59  Mg     2.6     07-14    TPro  8.0  /  Alb  3.0<L>  /  TBili  0.6  /  DBili  x   /  AST  17  /  ALT  15  /  AlkPhos  58  07-13          RADIOLOGY & ADDITIONAL TESTS: Reviewed.

## 2022-07-14 NOTE — PROVIDER CONTACT NOTE (EICU) - ASSESSMENT
Acute hypoxic and hypercarbic respiratory failure req NIV with BIPAP  Hypertensive urgency  Respiratory distress  DNR/DNI

## 2022-07-14 NOTE — PROGRESS NOTE ADULT - ASSESSMENT
78 y/o F w PMH of HTN, glaucoma, depression, on home oxygen 2L and ESRD (TTS) presents to the ED for sob after missing 2 sessions of dialysis. Patient was treated with BiPap and initially admitted to the floor for HD. At 3 am, rapid was called for agitation and hypertension. Patient was treated with ativan. Patient now admitted to ICU for airway monitoring and agitation.     #Neuro    #Cardiology     #Pulmonary     #Renal    #GI    #Endo    #ID    #Heme    #Ethics   - DNR/DNI  78 y/o F w PMH of HTN, glaucoma, depression, on home oxygen 2L and ESRD (TTS) presents to the ED for sob after missing 2 sessions of dialysis. Patient was treated with BiPap and initially admitted to the floor for HD. At 3 am, rapid was called for agitation and hypertension. Patient was treated with ativan. Patient now admitted to ICU for airway monitoring and agitation.     #Neuro  Agitation in the setting of hypertensive encephalopathy vs     #Cardiology     #Pulmonary     #Renal    #GI    #Endo    #ID    #Heme    #Ethics   - DNR/DNI  76 y/o F w PMH of HTN, glaucoma, depression, on home oxygen 2L and ESRD (TTS) presents to the ED for sob after missing 2 sessions of dialysis. Patient was treated with BiPap and initially admitted to the floor for HD. At 3 am, rapid was called for agitation and hypertension. Patient was treated with ativan. Patient now admitted to ICU for airway monitoring and agitation.     #Neuro  Agitation likely due to hypertensive encephalopathy vs metabolic encephalopathy vs hypoxia in the setting of     #Cardiology     #Pulmonary     #Renal    #GI    #Endo    #ID    #Heme    #Ethics   - DNR/DNI  78 y/o F w PMH of HTN, glaucoma, depression, on home oxygen 2L and ESRD (TTS) presents to the ED for sob after missing 2 sessions of dialysis. Patient was treated with BiPap and initially admitted to the floor for HD. At 3 am, rapid was called for agitation and hypertension. Patient was treated with ativan. Patient now admitted to ICU for airway monitoring and agitation.     #Neuro  Agitation likely due to dementia vs hypertensive encephalopathy vs metabolic encephalopathy vs hypoxia in the setting of respiratory distress  - hx of dementia   - s/p 1 mg ativan  - CT head scan done 07/13  - patient calm and cooperative at bedside     #Cardiology  Will consider getting an echocardiogram     Hypertension  - Continue home meds (coreg, hydralazine, nifedipine, clonidine)  - Continue home meds aspirin, atorvastatin     Troponemia likely due to demand ischemia vs renal dysfunction in the setting of ESRD   - Trend troponin and ekg     #Pulmonary   Acute respiratory distress likely due to fluid overload from missing dialysis   - HD today   - Patient on BiPaP   - Wean NIV as tolerated     #Renal  ESRD on HD  - HD today   - Monitor electrolytes   - Strict Is and Os     #GI  Advance to regular diet as tolerated     #Endo  No active issue     #ID  - on 7-days course of Zosyn in the setting of concerns for aspiration penumonia   - Will collect sputum culture  - Will follow up blood and urine cultures     #Heme  - No active issue   - DVT PPx: on SCD     #Ethics   - DNR/DNI

## 2022-07-14 NOTE — CHART NOTE - NSCHARTNOTEFT_GEN_A_CORE
RRT called on this pt for agitation and SOB .   HPI:  76 y/o F w PMH of HTN, glaucoma, depression, on home oxygen 2L and ESRD (TTS) presents to the ED for sob x3-4 days.  Patient states that she missed her last 2 dialysis sessions due to having diarrhea and vomiting since last week.  Patient states she also has been having a headache and feeling dizzy.  Denies any focal motor deficits or slurred speech. She fell yesterday without LOC or hitting her head.  Denies any chest pain or discomfort, denies any fevers, chills, cough or headache. Per EMS patient baseline of 3L O2 at home.  Upon arrival in ED, she has been put on BiPAP due to hypoxia.    CXR shows fluid overload, but better than her last CXR per ED reading. Official report pending.    Pt had already received 5 mg Lopressor for tachycardia and htn and benadryl for sleep as per patient's request . Pt was on BIPAP but pt keeps taking the bipap off . Upon arrival at RRT pt extremely agitated and in  % , Unable to obtain vital signs .     PHYSICAL EXAM:  GENERAL: agitated   HEAD:  Atraumatic, Normocephalic  NECK: Supple, No JVD  CHEST/LUNG: B/L diminished   HEART: Irregular       Assessment and plan     SOB   Agitated   Hypertensive crisis with acute encephalopathy     ICU consult   lopressor 5 mg IVP x 1   Ativan 1 mg IVP x 1   Lasix 80 mg IVP x 1   Psych for competency   Palliative care Vs ethics as per Dr Michael Rodriguez spoke to the daughter , Socorro [phone] As per daughter patient has been refusing HD and she is DNR/DNI ,  Pt to ICU for further care . RRT called on this pt for agitation and SOB .   HPI:  76 y/o F w PMH of HTN, glaucoma, depression, on home oxygen 2L and ESRD (TTS) presents to the ED for sob x3-4 days.  Patient states that she missed her last 2 dialysis sessions due to having diarrhea and vomiting since last week.  Patient states she also has been having a headache and feeling dizzy.  Denies any focal motor deficits or slurred speech. She fell yesterday without LOC or hitting her head.  Denies any chest pain or discomfort, denies any fevers, chills, cough or headache. Per EMS patient baseline of 3L O2 at home.  Upon arrival in ED, she has been put on BiPAP due to hypoxia.    CXR shows fluid overload, but better than her last CXR per ED reading. Official report pending.    Pt had already received 5 mg Lopressor for tachycardia and htn and benadryl for sleep as per patient's request . Pt was on BIPAP but pt keeps taking the bipap off . Upon arrival at RRT pt extremely agitated and in  % , Unable to obtain vital signs .     PHYSICAL EXAM:  GENERAL: agitated   HEAD:  Atraumatic, Normocephalic  NECK: Supple, No JVD  CHEST/LUNG: B/L diminished   HEART: Irregular       Assessment and plan     SOB   Agitated   Hypertensive crisis with acute encephalopathy     ABG   ICU consult   lopressor 5 mg IVP x 1   Ativan 1 mg IVP x 1   Lasix 80 mg IVP x 1   Psych for competency   Palliative care Vs ethics as per Dr Michael Rodriguez spoke to the daughter , Socorro [phone] As per daughter patient has been refusing HD and she is DNR/DNI ,  Pt to ICU for further care .

## 2022-07-14 NOTE — CHART NOTE - NSCHARTNOTEFT_GEN_A_CORE
Ms. Knight is a 77 year old female with a PMH of depression, glaucoma, HTN, ESRD on HD (non-compliant), Asthma on home o2, known DNR/DNI status with frequent hospitalizations who was admitted to medicine service for missed HD sessions and SOB. While on the floor pt was agitated and non-compliant with NIV. Pt was given benadryl prior by PA and now Ativan. During RRT pt noted to be hypertensive, hospitalist team requesting for pt to go to ICU. Patient transferred to ICU Continued ith   continue BiPAP  and  to reduce afterload given hydralazine. This am HD session this am nephrology Dr. Osborn called. Now off Precedex. Restarted on home medications for hypertension - coreg, hydralazine, clonidine and nifedipine.  Patient was placed on Precedex initially for agitation likely due to dementia.  Precedex now dc'd. CT head scan done 07/13. No acute pathology.   Troponemia likely due to demand ischemia vs renal dysfunction in the setting of ESRD.  Patient started on PO diet. Would continue  Antibiotics  7-days course of Zosyn in the setting of concerns for aspiration pneumonia .  DVT PPx: on SCD .  DNR/DNI in place. Patient awake and cooperative.  Patient seen and examined by ICU attending and stable for transfer to general medicine service.     Report given to Dr. Lizarraga and placed on Dr. Rodriguez' service    NELSON Gonzales  critical care Ms. Knight is a 77 year old female with a PMH of depression, glaucoma, HTN, ESRD on HD (non-compliant), Asthma on home o2, known DNR/DNI status with frequent hospitalizations who was admitted to medicine service for missed HD sessions and SOB. While on the floor pt was agitated and non-compliant with NIV. Pt was given benadryl prior by PA and now Ativan. During RRT pt noted to be hypertensive, hospitalist team requesting for pt to go to ICU. Patient transferred to ICU Continued ith   continue BiPAP  and  to reduce afterload given hydralazine. This am HD session this am nephrology Dr. Osborn called. Now off Precedex. Restarted on home medications for hypertension - coreg, hydralazine, clonidine and nifedipine.  Patient was placed on Precedex initially for agitation likely due to dementia.  Precedex now dc'd. CT head scan done 07/13. Head CT:"  1. Subtle decreased attenuation involving the left frontal brain parenchyma, which may represent beam hardening artifact versus an age-indeterminate ischemic event. Consider further evaluation to include  MRI with DWI and ADC mapping techniques, as clinically indicated, provided no contraindications.  2. Small left frontal subcutaneous focus of increased attenuation, which  may represent a small hematoma or dermal lesion. Correlate with clinical evaluation/direct visualization.  3. Age-appropriate involutional changes and atherosclerotic changes. No CT evidence of acute intracranial hemorrhage. Atherosclerotic changes.. Troponemia likely due to demand ischemia vs renal dysfunction in the setting of ESRD.  Patient started on PO diet. Would continue  Antibiotics  7-days course of Zosyn in the setting of concerns for aspiration pneumonia .  DVT PPx: on SCD .  DNR/DNI in place. Patient awake and cooperative.  Patient seen and examined by ICU attending and stable for transfer to general medicine service.     Report given to Dr. Lizarraga and placed on Dr. Rodriguez' service    NELSON Gonzales  critical care

## 2022-07-14 NOTE — PATIENT PROFILE ADULT - NSPROGENOTHERPROVIDER_GEN_A_NUR
outpatient care Azathioprine Counseling:  I discussed with the patient the risks of azathioprine including but not limited to myelosuppression, immunosuppression, hepatotoxicity, lymphoma, and infections.  The patient understands that monitoring is required including baseline LFTs, Creatinine, possible TPMP genotyping and weekly CBCs for the first month and then every 2 weeks thereafter.  The patient verbalized understanding of the proper use and possible adverse effects of azathioprine.  All of the patient's questions and concerns were addressed.

## 2022-07-14 NOTE — PATIENT PROFILE ADULT - NSPROHMDIABETMGMTSTRAT_GEN_A_NUR
activity/adequate rest/blood glucose testing/coping strategies/diet modification/exercise/insulin therapy/medication therapy/routine screenings/weight management

## 2022-07-14 NOTE — CONSULT NOTE ADULT - SUBJECTIVE AND OBJECTIVE BOX
Ms. Knight is a 77 year old female with a PMH of depression, glaucoma, HTN, ESRD on HD (non-compliant), Asthma on home o2, known DNR/DNI status with frequent hospitalizations who was admitted to medicine service for missed HD sessions and SOB. While on the floor pt was agitated and non-compliant with NIV. Pt was given benadryl prior by PA and now Ativan. During RRT pt noted to be hypertensive, hospitalist team requesting for pt to go to ICU.       Allergies  No Known Drug Allergies  shellfish (Swelling)        MEDICATIONS  (STANDING):  albuterol/ipratropium for Nebulization 3 milliLiter(s) Nebulizer every 6 hours  aspirin  chewable 81 milliGRAM(s) Oral daily  atorvastatin 80 milliGRAM(s) Oral at bedtime  carvedilol 50 milliGRAM(s) Oral once  carvedilol Oral Tab/Cap - Peds 25 milliGRAM(s) Oral two times a day  chlorhexidine 2% Cloths 1 Application(s) Topical <User Schedule>  cloNIDine 0.2 milliGRAM(s) Oral two times a day  cyanocobalamin 1000 MICROGram(s) Oral daily  hydrALAZINE 50 milliGRAM(s) Oral every 8 hours  hydrALAZINE Injectable 10 milliGRAM(s) IV Push once  NIFEdipine XL 60 milliGRAM(s) Oral daily  pantoprazole    Tablet 40 milliGRAM(s) Oral before breakfast        MEDICATIONS  (PRN):  acetaminophen     Tablet .. 650 milliGRAM(s) Oral every 6 hours PRN Temp greater or equal to 38C (100.4F), Mild Pain (1 - 3)  aluminum hydroxide/magnesium hydroxide/simethicone Suspension 30 milliLiter(s) Oral every 4 hours PRN Dyspepsia  melatonin 3 milliGRAM(s) Oral at bedtime PRN Insomnia  ondansetron Injectable 4 milliGRAM(s) IV Push every 8 hours PRN Nausea and/or Vomiting      Daily Height in cm: 154.94 (2022 15:31)    Daily Weight in k.2 (2022 23:30)        Drug Dosing Weight  Height (cm): 154.9 (2022 15:31)  Weight (kg): 80.7 (2022 15:31)  BMI (kg/m2): 33.6 (2022 15:31)  BSA (m2): 1.8 (2022 15:31)      PAST MEDICAL & SURGICAL HISTORY:  HTN (hypertension)  ESRD on dialysis  , Thurs, Sat  Asthma  DM (diabetes mellitus)  Dyslipidemia  Noncompliance with treatment  Severe anemia  On home oxygen therapy  History of cholecystectomy  History of left knee replacement      ADVANCE DIRECTIVES: DNR/DNI      REVIEW OF SYSTEMS: Unable to obtain ROS at this time.      Vital Signs Last 24 Hrs  T(C): 36.6 (2022 22:57), Max: 37.4 (2022 15:31)  T(F): 97.9 (2022 22:57), Max: 99.4 (2022 15:31)  HR: 129 (2022 04:33) (85 - 129)  BP: 158/95 (2022 01:00) (158/95 - 180/100)  RR: 12 (2022 23:30) (12 - 22)  SpO2: 100% (2022 04:33) (94% - 100%)      O2 Parameters below as of 2022 23:59  Patient On (Oxygen Delivery Method): BiPAP/CPAP      ABG - ( 2022 03:57 )  pH, Arterial: 7.16  pH, Blood: x     /  pCO2: 57    /  pO2: 74    / HCO3: 20    / Base Excess: -8.3  /  SaO2: 94.0        PHYSICAL EXAM:  GENERAL: Agitated, not cooperative  HEAD:  Atraumatic, Normocephalic  EYES: EOMI, PERRL  NECK: Supple  NERVOUS SYSTEM: Awake  CHEST/LUNG: Coarse to ascultation bilaterally  HEART: Regular rate and rhythm; No murmurs, rubs, or gallops, s1/s2 noted  ABDOMEN: Soft, Nontender, Nondistended; Bowel sounds present  EXTREMITIES:  2+ Peripheral Pulses  SKIN: No rashes or lesions        LABS:  CBC Full  -  ( 2022 03:59 )  WBC Count : 16.98 K/uL  RBC Count : 3.17 M/uL  Hemoglobin : 8.8 g/dL  Hematocrit : 27.2 %  Platelet Count - Automated : 339 K/uL  Mean Cell Volume : 85.8 fl  Mean Cell Hemoglobin : 27.8 pg  Mean Cell Hemoglobin Concentration : 32.4 g/dL  Auto Neutrophil # : x  Auto Lymphocyte # : x  Auto Monocyte # : x  Auto Eosinophil # : x  Auto Basophil # : x  Auto Neutrophil % : x  Auto Lymphocyte % : x  Auto Monocyte % : x  Auto Eosinophil % : x  Auto Basophil % : x        138  |  102  |  80<H>  ----------------------------<  87  5.2   |  21<L>  |  13.40<H>    Ca    8.9      2022 15:57    TPro  8.0  /  Alb  3.0<L>  /  TBili  0.6  /  DBili  x   /  AST  17  /  ALT  15  /  AlkPhos  58  07      CAPILLARY BLOOD GLUCOSE  POCT Blood Glucose.: 122 mg/dL (2022 03:36)      EKG: Sinus tach        CRITICAL CARE TIME SPENT: 60 minutes

## 2022-07-14 NOTE — CONSULT NOTE ADULT - ASSESSMENT
77 year old female with a PMH of depression, glaucoma, HTN, ESRD on HD (non-compliant), Asthma on home o2 presenting to ICU for missed HD session x2, non-compliance with tx, SOB 2/2 pulmonary edema, HTN urgency. Plan as follows:    --admit to ICU, monitor vitals, o2 sat, neuro status, tele rhythm  --continue NIV, increase i/e, abg prn  --reduce afterload, hydralazine ivp for now, assess pt response; pt to take PO antihypertensives when alert (s/p benadryl/ativan by hospitalist PA)  --HD session this AM per nephro  --s/p lasix 80 ivp given at time of RRT, assess response  --labs  --NPO for now  --dvt prophylaxis  --palliative vs ethics  --DNR/DNI status  --pt and family education    discussed above with eicu physician 77 year old female with a PMH of depression, glaucoma, HTN, ESRD on HD (non-compliant), Asthma on home o2 presenting to ICU for missed HD session x2, non-compliance with tx, SOB 2/2 pulmonary edema, HTN urgency. Plan as follows:    --admit to ICU, monitor vitals, o2 sat, neuro status, tele rhythm  --continue NIV, increase i/e, abg prn  --reduce afterload, hydralazine ivp for now, assess pt response; pt to take PO antihypertensives when alert (s/p benadryl/ativan by hospitalist PA)  --HD session this AM per nephro  --s/p lasix 80 ivp given at time of RRT, assess response  --labs  --empiric zosyn for ?asp pna, can deescalate if not req  --NPO for now  --dvt prophylaxis  --palliative vs ethics  --DNR/DNI status  --pt and family education    discussed above with eicu physician

## 2022-07-14 NOTE — PATIENT PROFILE ADULT - FALL HARM RISK - HARM RISK INTERVENTIONS

## 2022-07-14 NOTE — PROVIDER CONTACT NOTE (EICU) - BACKGROUND
76 y/o female with h/o HTN, asthma, on home O2, depression, ESRD on HD who has missed her last few HD sessions due to non compliance.   Critical care called for  and worsening respiratory status.

## 2022-07-14 NOTE — CONSULT NOTE ADULT - SUBJECTIVE AND OBJECTIVE BOX
Patient chart reviewed, full consult to follow.     Patient known to mattie non compliant with HD regimen, missed last 2 treatments    Will arrange for HD today and tomorrow.    Thank you for the courtesy of this consultation.   [facility] NEPHROLOG[facility], [facility]  NEPHROLOGY AND HYPERTENSION  [address]  [address]  [place] [address]  [phone]    MD MATTHEW PATIÑO MD ANDREY GONCHARUK, MD MADHU KORRAPATI, MD YELENA ROSENBERG, MD BINNY KOSHY, MD CHRISTOPHER CAPUTO, MD EDWARD BOVER, MD      Information from chart:  "Patient is a 77y old  Female who presents with a chief complaint of SOB. (2022 08:29)    HPI:  76 y/o F w PMH of HTN, glaucoma, depression, on home oxygen 2L and ESRD (TTS) presents to the ED for sob x3-4 days.  Patient states that she missed her last 2 dialysis sessions due to having diarrhea and vomiting since last week.  Patient states she also has been having a headache and feeling dizzy.  Denies any focal motor deficits or slurred speech. She fell yesterday without LOC or hitting her head.  Denies any chest pain or discomfort, denies any fevers, chills, cough or headache. Per EMS patient baseline of 3L O2 at home.  Upon arrival in ED, she has been put on BiPAP due to hypoxia.    CXR shows fluid overload, but better than her last CXR    Significant labs: K5.2, HCO3 21, Cr 13.4, Hb 7.5, BNP 84866.    EK bpm, no acute ST-T changes.     (2022 22:06)   "      Patient non compliant with treatments; hx of withdrawing from dialysis recently but resumed treatments under duress;       PAST MEDICAL & SURGICAL HISTORY:  HTN (hypertension)      ESRD on dialysis  , Thurs, Sat      Asthma      DM (diabetes mellitus)      Dyslipidemia      Noncompliance with treatment      Severe anemia      On home oxygen therapy      History of cholecystectomy      History of left knee replacement        FAMILY HISTORY:    Allergies    No Known Drug Allergies  shellfish (Swelling)    Intolerances      Home Medications:  aspirin 81 mg oral tablet, chewable: 1 tab(s) orally once a day (2022 16:36)  atorvastatin 80 mg oral tablet: 1 tab(s) orally once a day (2022 16:36)  carvedilol 25 mg oral tablet: 1 tab(s) orally 2 times a day (2022 16:36)  cloNIDine 0.2 mg oral tablet: 1 tab(s) orally 2 times a day (2022 16:36)  loperamide 2 mg oral capsule: 1 day(s) orally once a day (2022 18:14)  NIFEdipine 60 mg oral tablet, extended release: 1 tab(s) orally once a day (2022 18:26)  senna leaf extract oral tablet: 2 tab(s) orally once a day (at bedtime) (2022 11:49)  Zofran ODT 4 mg oral tablet, disintegratin tab(s) orally 3 times a day (2022 18:26)    MEDICATIONS  (STANDING):  albuterol/ipratropium for Nebulization 3 milliLiter(s) Nebulizer every 6 hours  aspirin  chewable 81 milliGRAM(s) Oral daily  atorvastatin 80 milliGRAM(s) Oral at bedtime  carvedilol 25 milliGRAM(s) Oral every 12 hours  chlorhexidine 2% Cloths 1 Application(s) Topical <User Schedule>  cloNIDine 0.2 milliGRAM(s) Oral two times a day  cyanocobalamin 1000 MICROGram(s) Oral daily  hydrALAZINE 50 milliGRAM(s) Oral every 8 hours  NIFEdipine XL 60 milliGRAM(s) Oral daily  pantoprazole    Tablet 40 milliGRAM(s) Oral before breakfast  piperacillin/tazobactam IVPB.. 3.375 Gram(s) IV Intermittent every 12 hours    MEDICATIONS  (PRN):  acetaminophen     Tablet .. 650 milliGRAM(s) Oral every 6 hours PRN Temp greater or equal to 38C (100.4F), Mild Pain (1 - 3)  aluminum hydroxide/magnesium hydroxide/simethicone Suspension 30 milliLiter(s) Oral every 4 hours PRN Dyspepsia  melatonin 3 milliGRAM(s) Oral at bedtime PRN Insomnia  ondansetron Injectable 4 milliGRAM(s) IV Push every 8 hours PRN Nausea and/or Vomiting    Vital Signs Last 24 Hrs  T(C): 36.3 (2022 14:00), Max: 37.2 (2022 08:00)  T(F): 97.3 (2022 14:00), Max: 98.9 (2022 08:00)  HR: 83 (2022 16:52) (70 - 129)  BP: 159/82 (2022 15:00) (135/88 - 195/93)  BP(mean): 104 (2022 15:00) (86 - 172)  RR: 23 (2022 15:00) (12 - 36)  SpO2: 97% (2022 16:52) (89% - 100%)    Parameters below as of 2022 11:23  Patient On (Oxygen Delivery Method): BiPAP/CPAP        Daily     Daily Weight in k.7 (2022 04:35)    22 @ 07:01  -  22 @ 07:00  --------------------------------------------------------  IN: 129.5 mL / OUT: 0 mL / NET: 129.5 mL    22 @ 07:01  -  22 @ 17:07  --------------------------------------------------------  IN: 99.4 mL / OUT: 3000 mL / NET: -2900.6 mL      CAPILLARY BLOOD GLUCOSE      POCT Blood Glucose.: 122 mg/dL (2022 03:36)    PHYSICAL EXAM:      T(C): 36.3 (22 @ 14:00), Max: 37.2 (22 @ 08:00)  HR: 83 (22 @ 16:52) (70 - 129)  BP: 159/82 (22 @ 15:00) (135/88 - 195/93)  RR: 23 (22 @ 15:00) (12 - 36)  SpO2: 97% (22 @ 16:52) (89% - 100%)  Wt(kg): --  Lungs decreased BS  on bipap  Heart S1S2  Abd soft NT ND  Extremities:   tr edema                  137  |  100  |  87<H>  ----------------------------<  117<H>  5.2   |  21<L>  |  14.00<H>    Ca    9.4      2022 03:59  Mg     2.6     07-14    TPro  8.0  /  Alb  3.0<L>  /  TBili  0.6  /  DBili  x   /  AST  17  /  ALT  15  /  AlkPhos  58  07-13                          8.8    16.98 )-----------( 339      ( 2022 03:59 )             27.2     Creatinine Trend: 14.00<--, 13.40<--, 4.87<--, 4.56<--, 6.40<--, 5.29<--    ABG - ( 2022 09:42 )  pH, Arterial: 7.35  pH, Blood: x     /  pCO2: 37    /  pO2: 168   / HCO3: 20    / Base Excess: -4.8  /  SaO2: 99.8                  Assessment   ESRD, non compliant; fluid overloaded    Plan  HD fdr today and tomorrow  Discussed with daughter, GOC for patient; to decide on remaining HD treatments or hospice.     Harry Osborn MD

## 2022-07-15 LAB
ALBUMIN SERPL ELPH-MCNC: 2.5 G/DL — LOW (ref 3.3–5)
ALP SERPL-CCNC: 57 U/L — SIGNIFICANT CHANGE UP (ref 40–120)
ALT FLD-CCNC: 14 U/L — SIGNIFICANT CHANGE UP (ref 12–78)
ANION GAP SERPL CALC-SCNC: 12 MMOL/L — SIGNIFICANT CHANGE UP (ref 5–17)
AST SERPL-CCNC: 13 U/L — LOW (ref 15–37)
BILIRUB SERPL-MCNC: 0.5 MG/DL — SIGNIFICANT CHANGE UP (ref 0.2–1.2)
BLD GP AB SCN SERPL QL: SIGNIFICANT CHANGE UP
BUN SERPL-MCNC: 44 MG/DL — HIGH (ref 7–23)
CALCIUM SERPL-MCNC: 8.6 MG/DL — SIGNIFICANT CHANGE UP (ref 8.5–10.1)
CHLORIDE SERPL-SCNC: 100 MMOL/L — SIGNIFICANT CHANGE UP (ref 96–108)
CO2 SERPL-SCNC: 27 MMOL/L — SIGNIFICANT CHANGE UP (ref 22–31)
CREAT SERPL-MCNC: 7.75 MG/DL — HIGH (ref 0.5–1.3)
EGFR: 5 ML/MIN/1.73M2 — LOW
GLUCOSE SERPL-MCNC: 95 MG/DL — SIGNIFICANT CHANGE UP (ref 70–99)
HCT VFR BLD CALC: 19.6 % — CRITICAL LOW (ref 34.5–45)
HCT VFR BLD CALC: 21.1 % — LOW (ref 34.5–45)
HGB BLD-MCNC: 6.4 G/DL — CRITICAL LOW (ref 11.5–15.5)
HGB BLD-MCNC: 6.9 G/DL — CRITICAL LOW (ref 11.5–15.5)
MAGNESIUM SERPL-MCNC: 2.3 MG/DL — SIGNIFICANT CHANGE UP (ref 1.6–2.6)
MCHC RBC-ENTMCNC: 27.4 PG — SIGNIFICANT CHANGE UP (ref 27–34)
MCHC RBC-ENTMCNC: 27.5 PG — SIGNIFICANT CHANGE UP (ref 27–34)
MCHC RBC-ENTMCNC: 32.7 G/DL — SIGNIFICANT CHANGE UP (ref 32–36)
MCHC RBC-ENTMCNC: 32.7 G/DL — SIGNIFICANT CHANGE UP (ref 32–36)
MCV RBC AUTO: 83.8 FL — SIGNIFICANT CHANGE UP (ref 80–100)
MCV RBC AUTO: 84.1 FL — SIGNIFICANT CHANGE UP (ref 80–100)
NRBC # BLD: 0 /100 WBCS — SIGNIFICANT CHANGE UP (ref 0–0)
NRBC # BLD: 0 /100 WBCS — SIGNIFICANT CHANGE UP (ref 0–0)
PHOSPHATE SERPL-MCNC: 5.7 MG/DL — HIGH (ref 2.5–4.5)
PLATELET # BLD AUTO: 230 K/UL — SIGNIFICANT CHANGE UP (ref 150–400)
PLATELET # BLD AUTO: 243 K/UL — SIGNIFICANT CHANGE UP (ref 150–400)
POTASSIUM SERPL-MCNC: 3.8 MMOL/L — SIGNIFICANT CHANGE UP (ref 3.5–5.3)
POTASSIUM SERPL-SCNC: 3.8 MMOL/L — SIGNIFICANT CHANGE UP (ref 3.5–5.3)
PROT SERPL-MCNC: 6.6 GM/DL — SIGNIFICANT CHANGE UP (ref 6–8.3)
RBC # BLD: 2.34 M/UL — LOW (ref 3.8–5.2)
RBC # BLD: 2.51 M/UL — LOW (ref 3.8–5.2)
RBC # FLD: 15 % — HIGH (ref 10.3–14.5)
RBC # FLD: 15.1 % — HIGH (ref 10.3–14.5)
SODIUM SERPL-SCNC: 139 MMOL/L — SIGNIFICANT CHANGE UP (ref 135–145)
WBC # BLD: 7.7 K/UL — SIGNIFICANT CHANGE UP (ref 3.8–10.5)
WBC # BLD: 7.86 K/UL — SIGNIFICANT CHANGE UP (ref 3.8–10.5)
WBC # FLD AUTO: 7.7 K/UL — SIGNIFICANT CHANGE UP (ref 3.8–10.5)
WBC # FLD AUTO: 7.86 K/UL — SIGNIFICANT CHANGE UP (ref 3.8–10.5)

## 2022-07-15 PROCEDURE — 99233 SBSQ HOSP IP/OBS HIGH 50: CPT

## 2022-07-15 RX ORDER — ERYTHROPOIETIN 10000 [IU]/ML
10000 INJECTION, SOLUTION INTRAVENOUS; SUBCUTANEOUS
Refills: 0 | Status: DISCONTINUED | OUTPATIENT
Start: 2022-07-15 | End: 2022-07-22

## 2022-07-15 RX ADMIN — Medication 0.2 MILLIGRAM(S): at 18:03

## 2022-07-15 RX ADMIN — Medication 0.2 MILLIGRAM(S): at 05:58

## 2022-07-15 RX ADMIN — CHLORHEXIDINE GLUCONATE 1 APPLICATION(S): 213 SOLUTION TOPICAL at 05:58

## 2022-07-15 RX ADMIN — Medication 50 MILLIGRAM(S): at 05:58

## 2022-07-15 RX ADMIN — Medication 3 MILLIGRAM(S): at 00:00

## 2022-07-15 RX ADMIN — ATORVASTATIN CALCIUM 80 MILLIGRAM(S): 80 TABLET, FILM COATED ORAL at 21:23

## 2022-07-15 RX ADMIN — PANTOPRAZOLE SODIUM 40 MILLIGRAM(S): 20 TABLET, DELAYED RELEASE ORAL at 08:02

## 2022-07-15 RX ADMIN — Medication 50 MILLIGRAM(S): at 14:11

## 2022-07-15 RX ADMIN — Medication 50 MILLIGRAM(S): at 21:22

## 2022-07-15 RX ADMIN — PIPERACILLIN AND TAZOBACTAM 25 GRAM(S): 4; .5 INJECTION, POWDER, LYOPHILIZED, FOR SOLUTION INTRAVENOUS at 06:07

## 2022-07-15 RX ADMIN — CARVEDILOL PHOSPHATE 25 MILLIGRAM(S): 80 CAPSULE, EXTENDED RELEASE ORAL at 17:07

## 2022-07-15 RX ADMIN — CARVEDILOL PHOSPHATE 25 MILLIGRAM(S): 80 CAPSULE, EXTENDED RELEASE ORAL at 04:41

## 2022-07-15 RX ADMIN — Medication 60 MILLIGRAM(S): at 05:58

## 2022-07-15 RX ADMIN — PIPERACILLIN AND TAZOBACTAM 25 GRAM(S): 4; .5 INJECTION, POWDER, LYOPHILIZED, FOR SOLUTION INTRAVENOUS at 17:10

## 2022-07-15 NOTE — PROGRESS NOTE ADULT - SUBJECTIVE AND OBJECTIVE BOX
Patient is a 77y old  Female who presents with a chief complaint of SOB. (14 Jul 2022 08:29)      INTERVAL HPI/ OVERNIGHT EVENTS: Pt was seen and examined at bedside today, No significant overnight events, pt admits to breathing better with Dialysis, pt denies any other complaints, pt express wishes that she wants to stop doing HD      MEDICATIONS  (STANDING):  albuterol/ipratropium for Nebulization 3 milliLiter(s) Nebulizer every 6 hours  aspirin  chewable 81 milliGRAM(s) Oral daily  atorvastatin 80 milliGRAM(s) Oral at bedtime  carvedilol 25 milliGRAM(s) Oral every 12 hours  chlorhexidine 2% Cloths 1 Application(s) Topical <User Schedule>  cloNIDine 0.2 milliGRAM(s) Oral two times a day  cyanocobalamin 1000 MICROGram(s) Oral daily  epoetin sergio-epbx (RETACRIT) Injectable 28496 Unit(s) IV Push <User Schedule>  hydrALAZINE 50 milliGRAM(s) Oral every 8 hours  NIFEdipine XL 60 milliGRAM(s) Oral daily  pantoprazole    Tablet 40 milliGRAM(s) Oral before breakfast  piperacillin/tazobactam IVPB.. 3.375 Gram(s) IV Intermittent every 12 hours    MEDICATIONS  (PRN):  acetaminophen     Tablet .. 650 milliGRAM(s) Oral every 6 hours PRN Temp greater or equal to 38C (100.4F), Mild Pain (1 - 3)  aluminum hydroxide/magnesium hydroxide/simethicone Suspension 30 milliLiter(s) Oral every 4 hours PRN Dyspepsia  melatonin 3 milliGRAM(s) Oral at bedtime PRN Insomnia  ondansetron Injectable 4 milliGRAM(s) IV Push every 8 hours PRN Nausea and/or Vomiting      Allergies    No Known Drug Allergies  shellfish (Swelling)    Intolerances        REVIEW OF SYSTEMS:    Unable to examine due to [ ] Encephalopathy [ ] Advanced Dementia [ ] Expressive Aphasia [ ] Non-verbal patient    CONSTITUTIONAL: No fever, NO generalized weakness/Fatigue, No weight loss  EYES: No eye pain, visual disturbances, or discharge  ENMT:  No difficulty hearing, tinnitus, vertigo; No sinus or throat pain  NECK: No pain or stiffness  RESPIRATORY: positive shortness of breath, no cough, wheezing, sputum or hemoptysis   CARDIOVASCULAR: No chest pain, palpitations, or leg swelling  GASTROINTESTINAL: No abdominal pain. No nausea, vomiting, diarrhea or constipation. No melena or hematochezia.  GENITOURINARY: No dysuria, frequency, hematuria, or incontinence  NEUROLOGICAL: No headaches, Dizziness, memory loss, loss of strength, numbness, or tremors  SKIN: No itching, burning, rashes, or lesions   MUSCULOSKELETAL: No joint pain or swelling; No muscle, back, or extremity pain  PSYCHIATRIC: No depression, anxiety, mood swings, or difficulty sleeping  HEME/LYMPH: No easy bruising, or bleeding gums      Vital Signs Last 24 Hrs  T(C): 37.2 (15 Jul 2022 18:00), Max: 37.2 (14 Jul 2022 23:52)  T(F): 98.9 (15 Jul 2022 18:00), Max: 98.9 (14 Jul 2022 23:52)  HR: 91 (15 Jul 2022 18:00) (73 - 91)  BP: 152/65 (15 Jul 2022 18:00) (144/64 - 194/78)  BP(mean): 112 (14 Jul 2022 23:00) (101 - 112)  RR: 18 (15 Jul 2022 18:00) (16 - 23)  SpO2: 99% (15 Jul 2022 18:00) (93% - 100%)    Parameters below as of 15 Jul 2022 18:00  Patient On (Oxygen Delivery Method): nasal cannula,3        PHYSICAL EXAM:  GENERAL: NAD on NC, Obese   HEAD:  Atraumatic, Normocephalic  EYES: conjunctiva and sclera clear  ENMT: Moist mucous membranes  NECK: Supple, No JVD, Normal thyroid  CHEST/LUNG: Clear to Auscultation bilaterally; No rales, rhonchi, wheezing, or rubs  HEART: Regular rate and rhythm; No murmurs, rubs, or gallops  ABDOMEN: Soft, Nontender, Nondistended; Bowel sounds present  EXTREMITIES:  2+ Peripheral Pulses, No clubbing, cyanosis, or edema  SKIN: No rashes or lesions  NERVOUS SYSTEM:  Alert & Oriented X2-3, Good concentration; Motor Strength 5/5 B/L upper and lower extremities    LABS:                        6.9    7.86  )-----------( 243      ( 15 Jul 2022 09:30 )             21.1     07-15    139  |  100  |  44<H>  ----------------------------<  95  3.8   |  27  |  7.75<H>    Ca    8.6      15 Jul 2022 07:15  Phos  5.7     07-15  Mg     2.3     07-15    TPro  6.6  /  Alb  2.5<L>  /  TBili  0.5  /  DBili  x   /  AST  13<L>  /  ALT  14  /  AlkPhos  57  07-15        CAPILLARY BLOOD GLUCOSE              RADIOLOGY & ADDITIONAL TESTS:          Imaging Personally Reviewed:  [ ] YES  [ ] NO    Consultant(s) Notes Reviewed:  [ ] YES  [ ] NO    Care Discussed with Consultants/Other Providers [x ] YES  [ ] NO

## 2022-07-15 NOTE — PROGRESS NOTE ADULT - SUBJECTIVE AND OBJECTIVE BOX
S/p stable HD today    Vital Signs Last 24 Hrs  T(C): 37.2 (07-15-22 @ 18:00), Max: 37.2 (07-14-22 @ 23:52)  T(F): 98.9 (07-15-22 @ 18:00), Max: 98.9 (07-14-22 @ 23:52)  HR: 91 (07-15-22 @ 18:00) (73 - 91)  BP: 152/65 (07-15-22 @ 18:00) (144/64 - 186/81)  RR: 18 (07-15-22 @ 18:00) (16 - 20)  SpO2: 99% (07-15-22 @ 18:00) (93% - 100%)    I&O's Detail    14 Jul 2022 07:01  -  15 Jul 2022 07:00  --------------------------------------------------------  IN:    Dexmedetomidine: 99.4 mL    IV PiggyBack: 100 mL    Oral Fluid: 300 mL  Total IN: 499.4 mL    OUT:    Other (mL): 3000 mL  Total OUT: 3000 mL    Total NET: -2500.6 mL    15 Jul 2022 07:01  -  15 Jul 2022 23:10  --------------------------------------------------------  IN:    Oral Fluid: 880 mL  Total IN: 880 mL    OUT:    Other (mL): 2500 mL  Total OUT: 2500 mL    Total NET: -1620 mL    Lungs decreased BS b/l bases  Heart S1S2  Abd soft NT ND  Extremities:   tr edema                        6.9    7.86  )-----------( 243      ( 15 Jul 2022 09:30 )             21.1     15 Jul 2022 07:15    139    |  100    |  44     ----------------------------<  95     3.8     |  27     |  7.75     Ca    8.6        15 Jul 2022 07:15  Phos  5.7       15 Jul 2022 07:15  Mg     2.3       15 Jul 2022 07:15    TPro  6.6    /  Alb  2.5    /  TBili  0.5    /  DBili  x      /  AST  13     /  ALT  14     /  AlkPhos  57     15 Jul 2022 07:15    LIVER FUNCTIONS - ( 15 Jul 2022 07:15 )  Alb: 2.5 g/dL / Pro: 6.6 gm/dL / ALK PHOS: 57 U/L / ALT: 14 U/L / AST: 13 U/L / GGT: x           CARDIAC MARKERS ( 14 Jul 2022 05:30 )  x     / x     / 105 U/L / x     / 1.1 ng/mL    acetaminophen     Tablet .. 650 milliGRAM(s) Oral every 6 hours PRN  albuterol/ipratropium for Nebulization 3 milliLiter(s) Nebulizer every 6 hours  aluminum hydroxide/magnesium hydroxide/simethicone Suspension 30 milliLiter(s) Oral every 4 hours PRN  aspirin  chewable 81 milliGRAM(s) Oral daily  atorvastatin 80 milliGRAM(s) Oral at bedtime  carvedilol 25 milliGRAM(s) Oral every 12 hours  chlorhexidine 2% Cloths 1 Application(s) Topical <User Schedule>  cloNIDine 0.2 milliGRAM(s) Oral two times a day  cyanocobalamin 1000 MICROGram(s) Oral daily  epoetin sergio-epbx (RETACRIT) Injectable 06648 Unit(s) IV Push <User Schedule>  hydrALAZINE 50 milliGRAM(s) Oral every 8 hours  melatonin 3 milliGRAM(s) Oral at bedtime PRN  NIFEdipine XL 60 milliGRAM(s) Oral daily  ondansetron Injectable 4 milliGRAM(s) IV Push every 8 hours PRN  pantoprazole    Tablet 40 milliGRAM(s) Oral before breakfast  piperacillin/tazobactam IVPB.. 3.375 Gram(s) IV Intermittent every 12 hours    Assessment/Plan:    ESRD on HD  HD today w/2.5L fluid removal  Renal diet  Epo w/HD  Will f/u CBC in am  Consider bld transfusion if Hgb is trending down  D/w medicine    [phone]

## 2022-07-15 NOTE — PROGRESS NOTE ADULT - ASSESSMENT
78 y/o F w PMH of HTN, glaucoma, depression, on home oxygen 2L and ESRD (TTS) presents to the ED for sob x3-4 days.  Patient states that she missed her last 2 dialysis sessions due to having diarrhea and vomiting since last week.  Patient states she also has been having a headache and feeling dizzy.  Denies any focal motor deficits or slurred speech. She fell yesterday without LOC or hitting her head.  Denies any chest pain or discomfort, denies any fevers, chills, cough or headache. Per EMS patient baseline of 3L O2 at home.  Upon arrival in ED, she has been put on BiPAP due to hypoxia.  -CXR shows fluid overload, but better than her last CXR per ED reading. Official report pending.  -Significant labs: K5.2, HCO3 21, Cr 13.4, Hb 7.5, BNP 07732.  -EK bpm, no acute ST-T changes.    Acute on chronic respiratory failure w/ hypoxia   secondary to fluid overload; missed HD   s/p ICU course w/ Bipap and IV Lasix   volume maintenance with HD today    ESRD on HD on TThS.  She has missed her last 2 runs of HD.  Pt wants to be taken off of dialysis; she understand that she will not survive without it, this has been discussed with the patient and her daughter  will get Hospice referral     s/p fall today without LOC.  CT head: Slight decreased attenuation in left frontal brain parenchyma.   CT C-spine unremarkable.    HTN and Dyslipidemia.  Continue with Coreg, Clonidine, Hydralazine, Nifedipine, and Atorvastatin.    Severe anemia due to ESRD.  cont to monitor h/h   Transfuse prn, if pt allows     DVT prophylaxis: Heparin sq.    CODE STATUS: DNI/DNR.   Disposition: f/u Hospice evaluation

## 2022-07-16 LAB
ANION GAP SERPL CALC-SCNC: 9 MMOL/L — SIGNIFICANT CHANGE UP (ref 5–17)
BUN SERPL-MCNC: 26 MG/DL — HIGH (ref 7–23)
CALCIUM SERPL-MCNC: 8.4 MG/DL — LOW (ref 8.5–10.1)
CHLORIDE SERPL-SCNC: 101 MMOL/L — SIGNIFICANT CHANGE UP (ref 96–108)
CO2 SERPL-SCNC: 30 MMOL/L — SIGNIFICANT CHANGE UP (ref 22–31)
CREAT SERPL-MCNC: 5.3 MG/DL — HIGH (ref 0.5–1.3)
EGFR: 8 ML/MIN/1.73M2 — LOW
GLUCOSE SERPL-MCNC: 144 MG/DL — HIGH (ref 70–99)
HCT VFR BLD CALC: 19.9 % — CRITICAL LOW (ref 34.5–45)
HGB BLD-MCNC: 6.4 G/DL — CRITICAL LOW (ref 11.5–15.5)
MCHC RBC-ENTMCNC: 27.7 PG — SIGNIFICANT CHANGE UP (ref 27–34)
MCHC RBC-ENTMCNC: 32.2 G/DL — SIGNIFICANT CHANGE UP (ref 32–36)
MCV RBC AUTO: 86.1 FL — SIGNIFICANT CHANGE UP (ref 80–100)
NRBC # BLD: 0 /100 WBCS — SIGNIFICANT CHANGE UP (ref 0–0)
PLATELET # BLD AUTO: 259 K/UL — SIGNIFICANT CHANGE UP (ref 150–400)
POTASSIUM SERPL-MCNC: 3.2 MMOL/L — LOW (ref 3.5–5.3)
POTASSIUM SERPL-SCNC: 3.2 MMOL/L — LOW (ref 3.5–5.3)
RBC # BLD: 2.31 M/UL — LOW (ref 3.8–5.2)
RBC # FLD: 14.7 % — HIGH (ref 10.3–14.5)
SODIUM SERPL-SCNC: 140 MMOL/L — SIGNIFICANT CHANGE UP (ref 135–145)
WBC # BLD: 8.33 K/UL — SIGNIFICANT CHANGE UP (ref 3.8–10.5)
WBC # FLD AUTO: 8.33 K/UL — SIGNIFICANT CHANGE UP (ref 3.8–10.5)

## 2022-07-16 PROCEDURE — 99232 SBSQ HOSP IP/OBS MODERATE 35: CPT

## 2022-07-16 RX ORDER — POTASSIUM CHLORIDE 20 MEQ
20 PACKET (EA) ORAL ONCE
Refills: 0 | Status: COMPLETED | OUTPATIENT
Start: 2022-07-16 | End: 2022-07-16

## 2022-07-16 RX ADMIN — Medication 20 MILLIEQUIVALENT(S): at 10:17

## 2022-07-16 RX ADMIN — Medication 50 MILLIGRAM(S): at 23:03

## 2022-07-16 RX ADMIN — Medication 3 MILLILITER(S): at 11:28

## 2022-07-16 RX ADMIN — Medication 0.2 MILLIGRAM(S): at 17:33

## 2022-07-16 RX ADMIN — Medication 81 MILLIGRAM(S): at 11:08

## 2022-07-16 RX ADMIN — CARVEDILOL PHOSPHATE 25 MILLIGRAM(S): 80 CAPSULE, EXTENDED RELEASE ORAL at 15:43

## 2022-07-16 RX ADMIN — CHLORHEXIDINE GLUCONATE 1 APPLICATION(S): 213 SOLUTION TOPICAL at 05:08

## 2022-07-16 RX ADMIN — Medication 50 MILLIGRAM(S): at 05:07

## 2022-07-16 RX ADMIN — PREGABALIN 1000 MICROGRAM(S): 225 CAPSULE ORAL at 11:08

## 2022-07-16 RX ADMIN — Medication 0.2 MILLIGRAM(S): at 05:07

## 2022-07-16 RX ADMIN — Medication 60 MILLIGRAM(S): at 05:07

## 2022-07-16 RX ADMIN — PANTOPRAZOLE SODIUM 40 MILLIGRAM(S): 20 TABLET, DELAYED RELEASE ORAL at 07:45

## 2022-07-16 RX ADMIN — ATORVASTATIN CALCIUM 80 MILLIGRAM(S): 80 TABLET, FILM COATED ORAL at 23:03

## 2022-07-16 RX ADMIN — CARVEDILOL PHOSPHATE 25 MILLIGRAM(S): 80 CAPSULE, EXTENDED RELEASE ORAL at 05:07

## 2022-07-16 RX ADMIN — PIPERACILLIN AND TAZOBACTAM 25 GRAM(S): 4; .5 INJECTION, POWDER, LYOPHILIZED, FOR SOLUTION INTRAVENOUS at 05:06

## 2022-07-16 RX ADMIN — Medication 3 MILLILITER(S): at 17:23

## 2022-07-16 RX ADMIN — Medication 50 MILLIGRAM(S): at 13:37

## 2022-07-16 NOTE — PROGRESS NOTE ADULT - ASSESSMENT
76 y/o F w PMH of HTN, glaucoma, depression, on home oxygen 2L and ESRD (TTS) presents to the ED for sob x3-4 days.  Patient states that she missed her last 2 dialysis sessions due to having diarrhea and vomiting since last week.  Patient states she also has been having a headache and feeling dizzy.  Denies any focal motor deficits or slurred speech. She fell yesterday without LOC or hitting her head.  Denies any chest pain or discomfort, denies any fevers, chills, cough or headache. Per EMS patient baseline of 3L O2 at home.  Upon arrival in ED, she has been put on BiPAP due to hypoxia.  -CXR shows fluid overload, but better than her last CXR per ED reading. Official report pending.  -Significant labs: K5.2, HCO3 21, Cr 13.4, Hb 7.5, BNP 87852.  -EK bpm, no acute ST-T changes.    Acute on chronic respiratory failure w/ hypoxia   secondary to fluid overload; missed HD   s/p ICU course w/ Bipap and IV Lasix   cont w/ volume maintenance with HD     ESRD on HD on TThS.  She has missed her last 2 runs of HD.  Pt is now agreeable to continue with HD, will  continue as scheduled     s/p fall today without LOC.  CT head: Slight decreased attenuation in left frontal brain parenchyma.   CT C-spine unremarkable.    HTN and Dyslipidemia.  Continue with Coreg, Clonidine, Hydralazine, Nifedipine, and Atorvastatin.    Severe anemia due to ESRD.  will transfuse 1U pRBC today  cont to monitor h/h Transfuse prn    DVT prophylaxis: Heparin sq.    CODE STATUS: DNI/DNR.   Disposition: will get PT evaluation

## 2022-07-16 NOTE — PROGRESS NOTE ADULT - SUBJECTIVE AND OBJECTIVE BOX
Patient is a 77y old  Female who presents with a chief complaint of SOB. (15 Jul 2022 23:09)      INTERVAL HPI/ OVERNIGHT EVENTS: Pt was seen and examined at bedside today, No significant overnight events, pt admits that her breathing has improved since her HD session, she denies any other complaints at this time      MEDICATIONS  (STANDING):  albuterol/ipratropium for Nebulization 3 milliLiter(s) Nebulizer every 6 hours  aspirin  chewable 81 milliGRAM(s) Oral daily  atorvastatin 80 milliGRAM(s) Oral at bedtime  carvedilol 25 milliGRAM(s) Oral every 12 hours  chlorhexidine 2% Cloths 1 Application(s) Topical <User Schedule>  cloNIDine 0.2 milliGRAM(s) Oral two times a day  cyanocobalamin 1000 MICROGram(s) Oral daily  epoetin sergio-epbx (RETACRIT) Injectable 27596 Unit(s) IV Push <User Schedule>  hydrALAZINE 50 milliGRAM(s) Oral every 8 hours  NIFEdipine XL 60 milliGRAM(s) Oral daily  pantoprazole    Tablet 40 milliGRAM(s) Oral before breakfast  piperacillin/tazobactam IVPB.. 3.375 Gram(s) IV Intermittent every 12 hours    MEDICATIONS  (PRN):  acetaminophen     Tablet .. 650 milliGRAM(s) Oral every 6 hours PRN Temp greater or equal to 38C (100.4F), Mild Pain (1 - 3)  aluminum hydroxide/magnesium hydroxide/simethicone Suspension 30 milliLiter(s) Oral every 4 hours PRN Dyspepsia  melatonin 3 milliGRAM(s) Oral at bedtime PRN Insomnia  ondansetron Injectable 4 milliGRAM(s) IV Push every 8 hours PRN Nausea and/or Vomiting      Allergies    No Known Drug Allergies  shellfish (Swelling)    Intolerances        REVIEW OF SYSTEMS:    Unable to examine due to [ ] Encephalopathy [ ] Advanced Dementia [ ] Expressive Aphasia [ ] Non-verbal patient    CONSTITUTIONAL: No fever, NO generalized weakness/Fatigue, No weight loss  EYES: No eye pain, visual disturbances, or discharge  ENMT:  No difficulty hearing, tinnitus, vertigo; No sinus or throat pain  NECK: No pain or stiffness  RESPIRATORY: positive shortness of breath, no cough, wheezing, sputum or hemoptysis   CARDIOVASCULAR: No chest pain, palpitations, or leg swelling  GASTROINTESTINAL: No abdominal pain. No nausea, vomiting, diarrhea or constipation. No melena or hematochezia.  GENITOURINARY: No dysuria, frequency, hematuria, or incontinence  NEUROLOGICAL: No headaches, Dizziness, memory loss, loss of strength, numbness, or tremors  SKIN: No itching, burning, rashes, or lesions   MUSCULOSKELETAL: No joint pain or swelling; No muscle, back, or extremity pain  PSYCHIATRIC: No depression, anxiety, mood swings, or difficulty sleeping  HEME/LYMPH: No easy bruising, or bleeding gums      Vital Signs Last 24 Hrs  T(C): 36.7 (16 Jul 2022 11:16), Max: 37.2 (15 Jul 2022 18:00)  T(F): 98.1 (16 Jul 2022 11:16), Max: 98.9 (15 Jul 2022 18:00)  HR: 91 (16 Jul 2022 11:31) (62 - 94)  BP: 143/74 (16 Jul 2022 11:16) (122/63 - 152/65)  BP(mean): --  RR: 18 (16 Jul 2022 11:16) (16 - 18)  SpO2: 98% (16 Jul 2022 11:31) (95% - 100%)    Parameters below as of 16 Jul 2022 11:31  Patient On (Oxygen Delivery Method): nasal cannula        PHYSICAL EXAM:  GENERAL: NAD on NC, Obese   HEAD:  Atraumatic, Normocephalic  EYES: conjunctiva and sclera clear  ENMT: Moist mucous membranes  NECK: Supple, No JVD, Normal thyroid  CHEST/LUNG: Clear to Auscultation bilaterally; No rales, rhonchi, wheezing, or rubs  HEART: Regular rate and rhythm; No murmurs, rubs, or gallops  ABDOMEN: Soft, Nontender, Nondistended; Bowel sounds present  EXTREMITIES:  2+ Peripheral Pulses, No clubbing, cyanosis, or edema  SKIN: No rashes or lesions  NERVOUS SYSTEM:  Alert & Oriented X3, Good concentration; Motor Strength 5/5 B/L upper and lower extremities    LABS:                        6.4    8.33  )-----------( 259      ( 16 Jul 2022 06:10 )             19.9     07-16    140  |  101  |  26<H>  ----------------------------<  144<H>  3.2<L>   |  30  |  5.30<H>    Ca    8.4<L>      16 Jul 2022 06:10  Phos  5.7     07-15  Mg     2.3     07-15    TPro  6.6  /  Alb  2.5<L>  /  TBili  0.5  /  DBili  x   /  AST  13<L>  /  ALT  14  /  AlkPhos  57  07-15        CAPILLARY BLOOD GLUCOSE              RADIOLOGY & ADDITIONAL TESTS:          Imaging Personally Reviewed:  [ ] YES  [ ] NO    Consultant(s) Notes Reviewed:  [ ] YES  [ ] NO    Care Discussed with Consultants/Other Providers [x ] YES  [ ] NO

## 2022-07-16 NOTE — PROGRESS NOTE ADULT - SUBJECTIVE AND OBJECTIVE BOX
Denies complaints    Vital Signs Last 24 Hrs  T(C): 36.8 (07-16-22 @ 17:20), Max: 36.8 (07-16-22 @ 00:01)  T(F): 98.3 (07-16-22 @ 17:20), Max: 98.3 (07-16-22 @ 17:20)  HR: 79 (07-16-22 @ 21:42) (62 - 94)  BP: 163/64 (07-16-22 @ 17:20) (122/63 - 163/64)  RR: 18 (07-16-22 @ 17:20) (16 - 18)  SpO2: 98% (07-16-22 @ 21:42) (95% - 100%)    I&O's Detail    15 Jul 2022 07:01  -  16 Jul 2022 07:00  --------------------------------------------------------  IN:    Oral Fluid: 880 mL  Total IN: 880 mL    OUT:    Other (mL): 2500 mL  Total OUT: 2500 mL    Total NET: -1620 mL    Lungs b/l air entry  Heart S1S2  Abd soft NT ND  Extremities:   tr edema                                6.4    8.33  )-----------( 259      ( 16 Jul 2022 06:10 )             19.9     16 Jul 2022 06:10    140    |  101    |  26     ----------------------------<  144    3.2     |  30     |  5.30     Ca    8.4        16 Jul 2022 06:10  Phos  5.7       15 Jul 2022 07:15  Mg     2.3       15 Jul 2022 07:15    TPro  6.6    /  Alb  2.5    /  TBili  0.5    /  DBili  x      /  AST  13     /  ALT  14     /  AlkPhos  57     15 Jul 2022 07:15    LIVER FUNCTIONS - ( 15 Jul 2022 07:15 )  Alb: 2.5 g/dL / Pro: 6.6 gm/dL / ALK PHOS: 57 U/L / ALT: 14 U/L / AST: 13 U/L / GGT: x           acetaminophen     Tablet .. 650 milliGRAM(s) Oral every 6 hours PRN  albuterol/ipratropium for Nebulization 3 milliLiter(s) Nebulizer every 6 hours  aluminum hydroxide/magnesium hydroxide/simethicone Suspension 30 milliLiter(s) Oral every 4 hours PRN  aspirin  chewable 81 milliGRAM(s) Oral daily  atorvastatin 80 milliGRAM(s) Oral at bedtime  carvedilol 25 milliGRAM(s) Oral every 12 hours  chlorhexidine 2% Cloths 1 Application(s) Topical <User Schedule>  cloNIDine 0.2 milliGRAM(s) Oral two times a day  cyanocobalamin 1000 MICROGram(s) Oral daily  epoetin sergio-epbx (RETACRIT) Injectable 66053 Unit(s) IV Push <User Schedule>  hydrALAZINE 50 milliGRAM(s) Oral every 8 hours  melatonin 3 milliGRAM(s) Oral at bedtime PRN  NIFEdipine XL 60 milliGRAM(s) Oral daily  ondansetron Injectable 4 milliGRAM(s) IV Push every 8 hours PRN  pantoprazole    Tablet 40 milliGRAM(s) Oral before breakfast    Assessment/Plan:    ESRD on HD MWF  Renal diet  Epo w/HD as BP permits   S/p 1u PRBCs transfusion today  F/u CBC  Next HD on Monday    [phone]

## 2022-07-16 NOTE — PROVIDER CONTACT NOTE (CRITICAL VALUE NOTIFICATION) - TEST AND RESULT REPORTED:
hemoglobin 6.9 hematocrit 21.1
hemoglobin 6.4 and hematocrit 19.6
HB 6.4,HCT 19.9
no chest pain and no edema.

## 2022-07-17 LAB
HCT VFR BLD CALC: 22.3 % — LOW (ref 34.5–45)
HGB BLD-MCNC: 7.3 G/DL — LOW (ref 11.5–15.5)
MCHC RBC-ENTMCNC: 27.4 PG — SIGNIFICANT CHANGE UP (ref 27–34)
MCHC RBC-ENTMCNC: 32.7 G/DL — SIGNIFICANT CHANGE UP (ref 32–36)
MCV RBC AUTO: 83.8 FL — SIGNIFICANT CHANGE UP (ref 80–100)
NRBC # BLD: 0 /100 WBCS — SIGNIFICANT CHANGE UP (ref 0–0)
PLATELET # BLD AUTO: 262 K/UL — SIGNIFICANT CHANGE UP (ref 150–400)
RBC # BLD: 2.66 M/UL — LOW (ref 3.8–5.2)
RBC # FLD: 14.6 % — HIGH (ref 10.3–14.5)
WBC # BLD: 8.66 K/UL — SIGNIFICANT CHANGE UP (ref 3.8–10.5)
WBC # FLD AUTO: 8.66 K/UL — SIGNIFICANT CHANGE UP (ref 3.8–10.5)

## 2022-07-17 PROCEDURE — 99232 SBSQ HOSP IP/OBS MODERATE 35: CPT

## 2022-07-17 RX ORDER — LOPERAMIDE HCL 2 MG
2 TABLET ORAL THREE TIMES A DAY
Refills: 0 | Status: DISCONTINUED | OUTPATIENT
Start: 2022-07-17 | End: 2022-07-22

## 2022-07-17 RX ADMIN — Medication 81 MILLIGRAM(S): at 12:58

## 2022-07-17 RX ADMIN — Medication 650 MILLIGRAM(S): at 17:43

## 2022-07-17 RX ADMIN — Medication 650 MILLIGRAM(S): at 10:37

## 2022-07-17 RX ADMIN — CARVEDILOL PHOSPHATE 25 MILLIGRAM(S): 80 CAPSULE, EXTENDED RELEASE ORAL at 16:40

## 2022-07-17 RX ADMIN — Medication 50 MILLIGRAM(S): at 14:53

## 2022-07-17 RX ADMIN — Medication 2 MILLIGRAM(S): at 21:14

## 2022-07-17 RX ADMIN — Medication 650 MILLIGRAM(S): at 11:37

## 2022-07-17 RX ADMIN — Medication 50 MILLIGRAM(S): at 21:13

## 2022-07-17 RX ADMIN — Medication 3 MILLIGRAM(S): at 02:52

## 2022-07-17 RX ADMIN — PREGABALIN 1000 MICROGRAM(S): 225 CAPSULE ORAL at 12:58

## 2022-07-17 RX ADMIN — Medication 3 MILLILITER(S): at 05:30

## 2022-07-17 RX ADMIN — CHLORHEXIDINE GLUCONATE 1 APPLICATION(S): 213 SOLUTION TOPICAL at 05:42

## 2022-07-17 RX ADMIN — Medication 0.2 MILLIGRAM(S): at 17:41

## 2022-07-17 RX ADMIN — ATORVASTATIN CALCIUM 80 MILLIGRAM(S): 80 TABLET, FILM COATED ORAL at 21:13

## 2022-07-17 RX ADMIN — Medication 650 MILLIGRAM(S): at 18:43

## 2022-07-17 RX ADMIN — Medication 3 MILLILITER(S): at 17:13

## 2022-07-17 RX ADMIN — PANTOPRAZOLE SODIUM 40 MILLIGRAM(S): 20 TABLET, DELAYED RELEASE ORAL at 08:30

## 2022-07-17 NOTE — PROGRESS NOTE ADULT - SUBJECTIVE AND OBJECTIVE BOX
Patient is a 77y old  Female who presents with a chief complaint of SOB. (16 Jul 2022 22:05)      INTERVAL HPI/ OVERNIGHT EVENTS: Pt was seen and examined at bedside today, No significant overnight events, pt admits to feeling better, denies SOB w/ NC     MEDICATIONS  (STANDING):  albuterol/ipratropium for Nebulization 3 milliLiter(s) Nebulizer every 6 hours  aspirin  chewable 81 milliGRAM(s) Oral daily  atorvastatin 80 milliGRAM(s) Oral at bedtime  carvedilol 25 milliGRAM(s) Oral every 12 hours  chlorhexidine 2% Cloths 1 Application(s) Topical <User Schedule>  cloNIDine 0.2 milliGRAM(s) Oral two times a day  cyanocobalamin 1000 MICROGram(s) Oral daily  epoetin sergio-epbx (RETACRIT) Injectable 35751 Unit(s) IV Push <User Schedule>  hydrALAZINE 50 milliGRAM(s) Oral every 8 hours  NIFEdipine XL 60 milliGRAM(s) Oral daily  pantoprazole    Tablet 40 milliGRAM(s) Oral before breakfast    MEDICATIONS  (PRN):  acetaminophen     Tablet .. 650 milliGRAM(s) Oral every 6 hours PRN Temp greater or equal to 38C (100.4F), Mild Pain (1 - 3)  aluminum hydroxide/magnesium hydroxide/simethicone Suspension 30 milliLiter(s) Oral every 4 hours PRN Dyspepsia  loperamide 2 milliGRAM(s) Oral three times a day PRN Diarrhea  melatonin 3 milliGRAM(s) Oral at bedtime PRN Insomnia  ondansetron Injectable 4 milliGRAM(s) IV Push every 8 hours PRN Nausea and/or Vomiting      Allergies    No Known Drug Allergies  shellfish (Swelling)    Intolerances        REVIEW OF SYSTEMS:    Unable to examine due to [ ] Encephalopathy [ ] Advanced Dementia [ ] Expressive Aphasia [ ] Non-verbal patient    CONSTITUTIONAL: No fever, NO generalized weakness/Fatigue, No weight loss  EYES: No eye pain, visual disturbances, or discharge  ENMT:  No difficulty hearing, tinnitus, vertigo; No sinus or throat pain  NECK: No pain or stiffness  RESPIRATORY: positive shortness of breath, no cough, wheezing, sputum or hemoptysis   CARDIOVASCULAR: No chest pain, palpitations, or leg swelling  GASTROINTESTINAL: No abdominal pain. No nausea, vomiting, diarrhea or constipation. No melena or hematochezia.  GENITOURINARY: No dysuria, frequency, hematuria, or incontinence  NEUROLOGICAL: No headaches, Dizziness, memory loss, loss of strength, numbness, or tremors  SKIN: No itching, burning, rashes, or lesions   MUSCULOSKELETAL: No joint pain or swelling; No muscle, back, or extremity pain  PSYCHIATRIC: No depression, anxiety, mood swings, or difficulty sleeping  HEME/LYMPH: No easy bruising, or bleeding gums      Vital Signs Last 24 Hrs  T(C): 36.6 (17 Jul 2022 11:39), Max: 36.8 (16 Jul 2022 17:20)  T(F): 97.8 (17 Jul 2022 11:39), Max: 98.3 (16 Jul 2022 17:20)  HR: 67 (17 Jul 2022 11:39) (67 - 90)  BP: 167/65 (17 Jul 2022 11:39) (110/62 - 167/65)  BP(mean): --  RR: 17 (17 Jul 2022 11:39) (17 - 18)  SpO2: 100% (17 Jul 2022 11:39) (95% - 100%)    Parameters below as of 17 Jul 2022 11:39  Patient On (Oxygen Delivery Method): nasal cannula        PHYSICAL EXAM:  GENERAL: NAD on NC, Obese   HEAD:  Atraumatic, Normocephalic  EYES: conjunctiva and sclera clear  ENMT: Moist mucous membranes  NECK: Supple, No JVD, Normal thyroid  CHEST/LUNG: Clear to Auscultation bilaterally; No rales, rhonchi, wheezing, or rubs  HEART: Regular rate and rhythm; No murmurs, rubs, or gallops  ABDOMEN: Soft, Nontender, Nondistended; Bowel sounds present  EXTREMITIES:  2+ Peripheral Pulses, No clubbing, cyanosis, or edema  SKIN: No rashes or lesions  NERVOUS SYSTEM:  Alert & Oriented X3, Good concentration; Motor Strength 5/5 B/L upper and lower extremities    LABS:                        7.3    8.66  )-----------( 262      ( 17 Jul 2022 07:20 )             22.3     07-16    140  |  101  |  26<H>  ----------------------------<  144<H>  3.2<L>   |  30  |  5.30<H>    Ca    8.4<L>      16 Jul 2022 06:10          CAPILLARY BLOOD GLUCOSE              RADIOLOGY & ADDITIONAL TESTS:          Imaging Personally Reviewed:  [ ] YES  [ ] NO    Consultant(s) Notes Reviewed:  [ ] YES  [ ] NO    Care Discussed with Consultants/Other Providers [x ] YES  [ ] NO

## 2022-07-17 NOTE — PROGRESS NOTE ADULT - ASSESSMENT
78 y/o F w PMH of HTN, glaucoma, depression, on home oxygen 2L and ESRD (TTS) presents to the ED for sob x3-4 days.  Patient states that she missed her last 2 dialysis sessions due to having diarrhea and vomiting since last week.  Patient states she also has been having a headache and feeling dizzy.  Denies any focal motor deficits or slurred speech. She fell yesterday without LOC or hitting her head.  Denies any chest pain or discomfort, denies any fevers, chills, cough or headache. Per EMS patient baseline of 3L O2 at home.  Upon arrival in ED, she has been put on BiPAP due to hypoxia.  -CXR shows fluid overload, but better than her last CXR per ED reading. Official report pending.  -Significant labs: K5.2, HCO3 21, Cr 13.4, Hb 7.5, BNP 08578.  -EK bpm, no acute ST-T changes.    Acute on chronic respiratory failure w/ hypoxia   secondary to fluid overload; missed HD   s/p ICU course w/ Bipap and IV Lasix   cont w/ volume maintenance with HD     ESRD on HD on TThS.  She has missed her last 2 runs of HD.  Pt is now agreeable to continue with HD, will  continue as scheduled     s/p fall today without LOC.  CT head: Slight decreased attenuation in left frontal brain parenchyma.   CT C-spine unremarkable.    HTN and Dyslipidemia.  Continue with Coreg, Clonidine, Hydralazine, Nifedipine, and Atorvastatin.    Severe anemia due to ESRD.  will transfuse 1U pRBC today  cont to monitor h/h Transfuse prn    DVT prophylaxis: Heparin sq.    CODE STATUS: DNI/DNR.   Disposition: follow up with Hospice consult              76 y/o F w PMH of HTN, glaucoma, depression, on home oxygen 2L and ESRD (TTS) presents to the ED for sob x3-4 days.  Patient states that she missed her last 2 dialysis sessions due to having diarrhea and vomiting since last week.  Patient states she also has been having a headache and feeling dizzy.  Denies any focal motor deficits or slurred speech. She fell yesterday without LOC or hitting her head.  Denies any chest pain or discomfort, denies any fevers, chills, cough or headache. Per EMS patient baseline of 3L O2 at home.  Upon arrival in ED, she has been put on BiPAP due to hypoxia.  -CXR shows fluid overload, but better than her last CXR per ED reading. Official report pending.  -Significant labs: K5.2, HCO3 21, Cr 13.4, Hb 7.5, BNP 59808.  -EK bpm, no acute ST-T changes.    Acute on chronic respiratory failure w/ hypoxia   secondary to fluid overload; missed HD   s/p ICU course w/ Bipap and IV Lasix   cont w/ volume maintenance with HD     ESRD on HD on TThS.  She has missed her last 2 runs of HD.  Pt is now agreeable to continue with HD, will  continue as scheduled     Anemia due to ESRD.  s/p 1U pRBC today  cont to monitor h/h Transfuse prn    HTN and Dyslipidemia.  Continue with Coreg, Clonidine, Hydralazine, Nifedipine, and Atorvastatin.    Dementia:   continue supportive care.     DVT prophylaxis: Heparin sq.    CODE STATUS: DNI/DNR.   Disposition: follow up with Hospice consult     Plan discussed with DTR

## 2022-07-18 LAB
ANION GAP SERPL CALC-SCNC: 12 MMOL/L — SIGNIFICANT CHANGE UP (ref 5–17)
BUN SERPL-MCNC: 47 MG/DL — HIGH (ref 7–23)
CALCIUM SERPL-MCNC: 8.7 MG/DL — SIGNIFICANT CHANGE UP (ref 8.5–10.1)
CHLORIDE SERPL-SCNC: 100 MMOL/L — SIGNIFICANT CHANGE UP (ref 96–108)
CO2 SERPL-SCNC: 26 MMOL/L — SIGNIFICANT CHANGE UP (ref 22–31)
CREAT SERPL-MCNC: 8.67 MG/DL — HIGH (ref 0.5–1.3)
EGFR: 4 ML/MIN/1.73M2 — LOW
GLUCOSE BLDC GLUCOMTR-MCNC: 121 MG/DL — HIGH (ref 70–99)
GLUCOSE SERPL-MCNC: 117 MG/DL — HIGH (ref 70–99)
HCT VFR BLD CALC: 21.2 % — LOW (ref 34.5–45)
HGB BLD-MCNC: 7.3 G/DL — LOW (ref 11.5–15.5)
MCHC RBC-ENTMCNC: 28.5 PG — SIGNIFICANT CHANGE UP (ref 27–34)
MCHC RBC-ENTMCNC: 34.4 G/DL — SIGNIFICANT CHANGE UP (ref 32–36)
MCV RBC AUTO: 82.8 FL — SIGNIFICANT CHANGE UP (ref 80–100)
NRBC # BLD: 0 /100 WBCS — SIGNIFICANT CHANGE UP (ref 0–0)
PLATELET # BLD AUTO: 315 K/UL — SIGNIFICANT CHANGE UP (ref 150–400)
POTASSIUM SERPL-MCNC: 3.6 MMOL/L — SIGNIFICANT CHANGE UP (ref 3.5–5.3)
POTASSIUM SERPL-SCNC: 3.6 MMOL/L — SIGNIFICANT CHANGE UP (ref 3.5–5.3)
RBC # BLD: 2.56 M/UL — LOW (ref 3.8–5.2)
RBC # FLD: 14.5 % — SIGNIFICANT CHANGE UP (ref 10.3–14.5)
SODIUM SERPL-SCNC: 138 MMOL/L — SIGNIFICANT CHANGE UP (ref 135–145)
WBC # BLD: 6.89 K/UL — SIGNIFICANT CHANGE UP (ref 3.8–10.5)
WBC # FLD AUTO: 6.89 K/UL — SIGNIFICANT CHANGE UP (ref 3.8–10.5)

## 2022-07-18 PROCEDURE — 99232 SBSQ HOSP IP/OBS MODERATE 35: CPT

## 2022-07-18 RX ADMIN — PANTOPRAZOLE SODIUM 40 MILLIGRAM(S): 20 TABLET, DELAYED RELEASE ORAL at 07:48

## 2022-07-18 RX ADMIN — ERYTHROPOIETIN 10000 UNIT(S): 10000 INJECTION, SOLUTION INTRAVENOUS; SUBCUTANEOUS at 12:13

## 2022-07-18 RX ADMIN — Medication 50 MILLIGRAM(S): at 14:47

## 2022-07-18 RX ADMIN — Medication 3 MILLILITER(S): at 23:07

## 2022-07-18 RX ADMIN — Medication 0.2 MILLIGRAM(S): at 18:58

## 2022-07-18 RX ADMIN — Medication 50 MILLIGRAM(S): at 21:29

## 2022-07-18 RX ADMIN — PREGABALIN 1000 MICROGRAM(S): 225 CAPSULE ORAL at 14:44

## 2022-07-18 RX ADMIN — CHLORHEXIDINE GLUCONATE 1 APPLICATION(S): 213 SOLUTION TOPICAL at 05:43

## 2022-07-18 RX ADMIN — Medication 50 MILLIGRAM(S): at 05:43

## 2022-07-18 RX ADMIN — CARVEDILOL PHOSPHATE 25 MILLIGRAM(S): 80 CAPSULE, EXTENDED RELEASE ORAL at 05:43

## 2022-07-18 RX ADMIN — Medication 0.2 MILLIGRAM(S): at 00:30

## 2022-07-18 RX ADMIN — ATORVASTATIN CALCIUM 80 MILLIGRAM(S): 80 TABLET, FILM COATED ORAL at 21:28

## 2022-07-18 RX ADMIN — CARVEDILOL PHOSPHATE 25 MILLIGRAM(S): 80 CAPSULE, EXTENDED RELEASE ORAL at 17:01

## 2022-07-18 RX ADMIN — Medication 60 MILLIGRAM(S): at 05:43

## 2022-07-18 RX ADMIN — Medication 81 MILLIGRAM(S): at 14:47

## 2022-07-18 NOTE — PHYSICAL THERAPY INITIAL EVALUATION ADULT - PERTINENT HX OF CURRENT PROBLEM, REHAB EVAL
Patient is a 77y old  Female who presents with a chief complaint of SOB, s/p fall, admitted with acute on chronic respiratory failure w/ hypoxia.

## 2022-07-18 NOTE — PROGRESS NOTE ADULT - ASSESSMENT
78 y/o F w PMH of HTN, glaucoma, depression, on home oxygen 2L and ESRD (TTS) presents to the ED for sob x3-4 days.  Patient states that she missed her last 2 dialysis sessions due to having diarrhea and vomiting since last week.  Patient states she also has been having a headache and feeling dizzy.  Denies any focal motor deficits or slurred speech. She fell yesterday without LOC or hitting her head.  Denies any chest pain or discomfort, denies any fevers, chills, cough or headache. Per EMS patient baseline of 3L O2 at home.  Upon arrival in ED, she has been put on BiPAP due to hypoxia.  -CXR shows fluid overload, but better than her last CXR per ED reading. Official report pending.  -Significant labs: K5.2, HCO3 21, Cr 13.4, Hb 7.5, BNP 25071.  -EK bpm, no acute ST-T changes.    Acute on chronic respiratory failure w/ hypoxia   secondary to fluid overload; missed HD   s/p ICU course w/ Bipap and IV Lasix   cont w/ volume maintenance with HD     ESRD on HD on TThS.  She has missed her last 2 runs of HD.  Pt is now agreeable to continue with HD, will  continue as scheduled     Anemia due to ESRD.  s/p 1U pRBC   cont to monitor h/h Transfuse prn    HTN and Dyslipidemia.  Continue with Coreg, Clonidine, Hydralazine, Nifedipine, and Atorvastatin.    Dementia:   continue supportive care.     DVT prophylaxis: Heparin sq.    CODE STATUS: DNI/DNR.   Disposition: follow up with Hospice consult

## 2022-07-18 NOTE — PHYSICAL THERAPY INITIAL EVALUATION ADULT - TRANSFER TRAINING, PT EVAL
Independent in  transfer ability bed to chair and vice versa using appropriate assistive device  and prevent falls.,

## 2022-07-18 NOTE — PROGRESS NOTE ADULT - SUBJECTIVE AND OBJECTIVE BOX
Patient is a 77y old  Female who presents with a chief complaint of SOB. (17 Jul 2022 12:50)      INTERVAL HPI/ OVERNIGHT EVENTS: Pt was seen and examined at bedside today, No significant overnight events     MEDICATIONS  (STANDING):  albuterol/ipratropium for Nebulization 3 milliLiter(s) Nebulizer every 6 hours  aspirin  chewable 81 milliGRAM(s) Oral daily  atorvastatin 80 milliGRAM(s) Oral at bedtime  carvedilol 25 milliGRAM(s) Oral every 12 hours  chlorhexidine 2% Cloths 1 Application(s) Topical <User Schedule>  cloNIDine 0.2 milliGRAM(s) Oral two times a day  cyanocobalamin 1000 MICROGram(s) Oral daily  epoetin sergio-epbx (RETACRIT) Injectable 33651 Unit(s) IV Push <User Schedule>  hydrALAZINE 50 milliGRAM(s) Oral every 8 hours  NIFEdipine XL 60 milliGRAM(s) Oral daily  pantoprazole    Tablet 40 milliGRAM(s) Oral before breakfast    MEDICATIONS  (PRN):  acetaminophen     Tablet .. 650 milliGRAM(s) Oral every 6 hours PRN Temp greater or equal to 38C (100.4F), Mild Pain (1 - 3)  aluminum hydroxide/magnesium hydroxide/simethicone Suspension 30 milliLiter(s) Oral every 4 hours PRN Dyspepsia  loperamide 2 milliGRAM(s) Oral three times a day PRN Diarrhea  melatonin 3 milliGRAM(s) Oral at bedtime PRN Insomnia  ondansetron Injectable 4 milliGRAM(s) IV Push every 8 hours PRN Nausea and/or Vomiting      Allergies    No Known Drug Allergies  shellfish (Swelling)    Intolerances        REVIEW OF SYSTEMS:    Unable to examine due to [ ] Encephalopathy [ ] Advanced Dementia [ ] Expressive Aphasia [ ] Non-verbal patient    CONSTITUTIONAL: No fever, NO generalized weakness/Fatigue, No weight loss  EYES: No eye pain, visual disturbances, or discharge  ENMT:  No difficulty hearing, tinnitus, vertigo; No sinus or throat pain  NECK: No pain or stiffness  RESPIRATORY: NO shortness of breath, no cough, wheezing, sputum or hemoptysis   CARDIOVASCULAR: No chest pain, palpitations, or leg swelling  GASTROINTESTINAL: No abdominal pain. No nausea, vomiting, diarrhea or constipation. No melena or hematochezia.  GENITOURINARY: No dysuria, frequency, hematuria, or incontinence  NEUROLOGICAL: No headaches, Dizziness, memory loss, loss of strength, numbness, or tremors  SKIN: No itching, burning, rashes, or lesions   MUSCULOSKELETAL: No joint pain or swelling; No muscle, back, or extremity pain  PSYCHIATRIC: No depression, anxiety, mood swings, or difficulty sleeping  HEME/LYMPH: No easy bruising, or bleeding gums      Vital Signs Last 24 Hrs  T(C): 36.7 (18 Jul 2022 09:35), Max: 36.7 (18 Jul 2022 00:18)  T(F): 98 (18 Jul 2022 09:35), Max: 98.1 (18 Jul 2022 00:18)  HR: 60 (18 Jul 2022 09:35) (60 - 81)  BP: 168/90 (18 Jul 2022 09:35) (158/66 - 202/87)  BP(mean): --  RR: 16 (18 Jul 2022 09:35) (16 - 18)  SpO2: 98% (18 Jul 2022 09:35) (96% - 100%)    Parameters below as of 18 Jul 2022 09:35  Patient On (Oxygen Delivery Method): nasal cannula  O2 Flow (L/min): 3      PHYSICAL EXAM:  GENERAL: NAD, well-developed, well-groomed  HEAD:  Atraumatic, Normocephalic  EYES: conjunctiva and sclera clear  ENMT: Moist mucous membranes  NECK: Supple, No JVD, Normal thyroid  CHEST/LUNG: Clear to Auscultation bilaterally; No rales, rhonchi, wheezing, or rubs  HEART: Regular rate and rhythm; No murmurs, rubs, or gallops  ABDOMEN: Soft, Nontender, Nondistended; Bowel sounds present  EXTREMITIES:  2+ Peripheral Pulses, No clubbing, cyanosis, or edema  SKIN: No rashes or lesions  NERVOUS SYSTEM:  Alert & Oriented X3, Good concentration; Motor Strength 5/5 B/L upper and lower extremities    LABS:                        7.3    6.89  )-----------( 315      ( 18 Jul 2022 09:25 )             21.2     07-18    138  |  100  |  47<H>  ----------------------------<  117<H>  3.6   |  26  |  8.67<H>    Ca    8.7      18 Jul 2022 09:25          CAPILLARY BLOOD GLUCOSE              RADIOLOGY & ADDITIONAL TESTS:          Imaging Personally Reviewed:  [ ] YES  [ ] NO    Consultant(s) Notes Reviewed:  [ ] YES  [ ] NO    Care Discussed with Consultants/Other Providers [x ] YES  [ ] NO Patient is a 77y old  Female who presents with a chief complaint of SOB. (17 Jul 2022 12:50)      INTERVAL HPI/ OVERNIGHT EVENTS: Pt was seen and examined at bedside today, No significant overnight events, pt admits to feeling comfortable with her breathing and nasal canula, she denies any acute complaints      MEDICATIONS  (STANDING):  albuterol/ipratropium for Nebulization 3 milliLiter(s) Nebulizer every 6 hours  aspirin  chewable 81 milliGRAM(s) Oral daily  atorvastatin 80 milliGRAM(s) Oral at bedtime  carvedilol 25 milliGRAM(s) Oral every 12 hours  chlorhexidine 2% Cloths 1 Application(s) Topical <User Schedule>  cloNIDine 0.2 milliGRAM(s) Oral two times a day  cyanocobalamin 1000 MICROGram(s) Oral daily  epoetin sergio-epbx (RETACRIT) Injectable 51825 Unit(s) IV Push <User Schedule>  hydrALAZINE 50 milliGRAM(s) Oral every 8 hours  NIFEdipine XL 60 milliGRAM(s) Oral daily  pantoprazole    Tablet 40 milliGRAM(s) Oral before breakfast    MEDICATIONS  (PRN):  acetaminophen     Tablet .. 650 milliGRAM(s) Oral every 6 hours PRN Temp greater or equal to 38C (100.4F), Mild Pain (1 - 3)  aluminum hydroxide/magnesium hydroxide/simethicone Suspension 30 milliLiter(s) Oral every 4 hours PRN Dyspepsia  loperamide 2 milliGRAM(s) Oral three times a day PRN Diarrhea  melatonin 3 milliGRAM(s) Oral at bedtime PRN Insomnia  ondansetron Injectable 4 milliGRAM(s) IV Push every 8 hours PRN Nausea and/or Vomiting      Allergies    No Known Drug Allergies  shellfish (Swelling)    Intolerances        REVIEW OF SYSTEMS:    Unable to examine due to [ ] Encephalopathy [ ] Advanced Dementia [ ] Expressive Aphasia [ ] Non-verbal patient    CONSTITUTIONAL: No fever, NO generalized weakness/Fatigue, No weight loss  EYES: No eye pain, visual disturbances, or discharge  ENMT:  No difficulty hearing, tinnitus, vertigo; No sinus or throat pain  NECK: No pain or stiffness  RESPIRATORY: NO shortness of breath (on Nasal canula), no cough, wheezing, sputum or hemoptysis   CARDIOVASCULAR: No chest pain, palpitations, or leg swelling  GASTROINTESTINAL: No abdominal pain. No nausea, vomiting, diarrhea or constipation. No melena or hematochezia.  GENITOURINARY: No dysuria, frequency, hematuria, or incontinence  NEUROLOGICAL: No headaches, Dizziness, memory loss, loss of strength, numbness, or tremors  SKIN: No itching, burning, rashes, or lesions   MUSCULOSKELETAL: No joint pain or swelling; No muscle, back, or extremity pain  PSYCHIATRIC: No depression, anxiety, mood swings, or difficulty sleeping  HEME/LYMPH: No easy bruising, or bleeding gums      Vital Signs Last 24 Hrs  T(C): 36.7 (18 Jul 2022 09:35), Max: 36.7 (18 Jul 2022 00:18)  T(F): 98 (18 Jul 2022 09:35), Max: 98.1 (18 Jul 2022 00:18)  HR: 60 (18 Jul 2022 09:35) (60 - 81)  BP: 168/90 (18 Jul 2022 09:35) (158/66 - 202/87)  BP(mean): --  RR: 16 (18 Jul 2022 09:35) (16 - 18)  SpO2: 98% (18 Jul 2022 09:35) (96% - 100%)    Parameters below as of 18 Jul 2022 09:35  Patient On (Oxygen Delivery Method): nasal cannula  O2 Flow (L/min): 3      PHYSICAL EXAM:  GENERAL: NAD on NC, Obese   HEAD:  Atraumatic, Normocephalic  EYES: conjunctiva and sclera clear  ENMT: Moist mucous membranes  NECK: Supple, No JVD, Normal thyroid  CHEST/LUNG: Clear to Auscultation bilaterally; No rales, rhonchi, wheezing, or rubs  HEART: Regular rate and rhythm; No murmurs, rubs, or gallops  ABDOMEN: Soft, Nontender, Nondistended; Bowel sounds present  EXTREMITIES:  2+ Peripheral Pulses, No clubbing, cyanosis, or edema  SKIN: No rashes or lesions  NERVOUS SYSTEM:  Alert & Oriented X3, Good concentration; Motor Strength 5/5 B/L upper and lower extremities    LABS:                        7.3    6.89  )-----------( 315      ( 18 Jul 2022 09:25 )             21.2     07-18    138  |  100  |  47<H>  ----------------------------<  117<H>  3.6   |  26  |  8.67<H>    Ca    8.7      18 Jul 2022 09:25          CAPILLARY BLOOD GLUCOSE              RADIOLOGY & ADDITIONAL TESTS:          Imaging Personally Reviewed:  [ ] YES  [ ] NO    Consultant(s) Notes Reviewed:  [ ] YES  [ ] NO    Care Discussed with Consultants/Other Providers [x ] YES  [ ] NO

## 2022-07-18 NOTE — PHYSICAL THERAPY INITIAL EVALUATION ADULT - GENERAL OBSERVATIONS, REHAB EVAL
Pt found semi supine in bed in NAD, +hep lock, +L UE av fistula, +2LO2 via NC, agreeable to PT Bel and PEÑA Tanner aware.

## 2022-07-18 NOTE — PHYSICAL THERAPY INITIAL EVALUATION ADULT - ADDITIONAL COMMENTS
As per pt, lives in a pvt house with family, 4 [address] with bilateral hand rails, bed room at the main level of the house, Reports to use a cane and rollator as needed for community ambulation, reports to utilizes furniture and wall support to ambulate inside the house. Pt states her daughter and family members can assist her as needed.

## 2022-07-18 NOTE — PROGRESS NOTE ADULT - SUBJECTIVE AND OBJECTIVE BOX
[facility] NEPHROLOGY SERVICES, [facility]  NEPHROLOGY AND HYPERTENSION  [address]  [address]  [place] [address]  [phone]    MD MATTHEW PATIÑO MD ANDREY GONCHARUK, MD MADHU KORRAPATI, MD YELENA ROSENBERG, MD RON BRANCH, MD COLBY RODRIGUEZ MD          Patient events noted  No distress    MEDICATIONS  (STANDING):  albuterol/ipratropium for Nebulization 3 milliLiter(s) Nebulizer every 6 hours  aspirin  chewable 81 milliGRAM(s) Oral daily  atorvastatin 80 milliGRAM(s) Oral at bedtime  carvedilol 25 milliGRAM(s) Oral every 12 hours  chlorhexidine 2% Cloths 1 Application(s) Topical <User Schedule>  cloNIDine 0.2 milliGRAM(s) Oral two times a day  cyanocobalamin 1000 MICROGram(s) Oral daily  epoetin sergio-epbx (RETACRIT) Injectable 80295 Unit(s) IV Push <User Schedule>  hydrALAZINE 50 milliGRAM(s) Oral every 8 hours  NIFEdipine XL 60 milliGRAM(s) Oral daily  pantoprazole    Tablet 40 milliGRAM(s) Oral before breakfast    MEDICATIONS  (PRN):  acetaminophen     Tablet .. 650 milliGRAM(s) Oral every 6 hours PRN Temp greater or equal to 38C (100.4F), Mild Pain (1 - 3)  aluminum hydroxide/magnesium hydroxide/simethicone Suspension 30 milliLiter(s) Oral every 4 hours PRN Dyspepsia  loperamide 2 milliGRAM(s) Oral three times a day PRN Diarrhea  melatonin 3 milliGRAM(s) Oral at bedtime PRN Insomnia  ondansetron Injectable 4 milliGRAM(s) IV Push every 8 hours PRN Nausea and/or Vomiting      07-17-22 @ 07:01  -  07-18-22 @ 07:00  --------------------------------------------------------  IN: 720 mL / OUT: 0 mL / NET: 720 mL    07-18-22 @ 07:01  -  07-18-22 @ 23:21  --------------------------------------------------------  IN: 360 mL / OUT: 2000 mL / NET: -1640 mL      PHYSICAL EXAM:      T(C): 36.1 (07-18-22 @ 16:26), Max: 36.9 (07-18-22 @ 13:05)  HR: 65 (07-18-22 @ 18:45) (60 - 67)  BP: 135/70 (07-18-22 @ 21:29) (135/70 - 202/87)  RR: 18 (07-18-22 @ 16:26) (16 - 18)  SpO2: 95% (07-18-22 @ 18:45) (95% - 100%)  Wt(kg): --  Lungs clear  Heart S1S2  Abd soft NT ND  Extremities:   tr edema                                    7.3    6.89  )-----------( 315      ( 18 Jul 2022 09:25 )             21.2     07-18    138  |  100  |  47<H>  ----------------------------<  117<H>  3.6   |  26  |  8.67<H>    Ca    8.7      18 Jul 2022 09:25          Creatinine Trend: 8.67<--, 5.30<--, 7.75<--, 14.00<--, 13.40<--, 4.87<--      Assessment   ESRD; fluid overload   Anemia     Plan:  Maintenance HD  UF as tolerated  Discharge planning     Harry Osborn MD

## 2022-07-18 NOTE — PHYSICAL THERAPY INITIAL EVALUATION ADULT - IMPAIRMENTS FOUND, PT EVAL
aerobic capacity/endurance/decreased midline orientation/ergonomics and body mechanics/gait, locomotion, and balance/joint integrity and mobility/muscle strength/posture/ventilation and respiration/gas exchange

## 2022-07-19 PROCEDURE — 99232 SBSQ HOSP IP/OBS MODERATE 35: CPT

## 2022-07-19 RX ORDER — IPRATROPIUM/ALBUTEROL SULFATE 18-103MCG
3 AEROSOL WITH ADAPTER (GRAM) INHALATION EVERY 6 HOURS
Refills: 0 | Status: DISCONTINUED | OUTPATIENT
Start: 2022-07-19 | End: 2022-07-22

## 2022-07-19 RX ADMIN — CHLORHEXIDINE GLUCONATE 1 APPLICATION(S): 213 SOLUTION TOPICAL at 05:33

## 2022-07-19 RX ADMIN — ATORVASTATIN CALCIUM 80 MILLIGRAM(S): 80 TABLET, FILM COATED ORAL at 22:06

## 2022-07-19 RX ADMIN — Medication 50 MILLIGRAM(S): at 05:20

## 2022-07-19 RX ADMIN — CARVEDILOL PHOSPHATE 25 MILLIGRAM(S): 80 CAPSULE, EXTENDED RELEASE ORAL at 15:27

## 2022-07-19 RX ADMIN — Medication 50 MILLIGRAM(S): at 14:01

## 2022-07-19 RX ADMIN — Medication 0.2 MILLIGRAM(S): at 17:30

## 2022-07-19 RX ADMIN — Medication 650 MILLIGRAM(S): at 10:04

## 2022-07-19 RX ADMIN — Medication 50 MILLIGRAM(S): at 22:06

## 2022-07-19 RX ADMIN — Medication 0.2 MILLIGRAM(S): at 05:20

## 2022-07-19 RX ADMIN — ONDANSETRON 4 MILLIGRAM(S): 8 TABLET, FILM COATED ORAL at 15:34

## 2022-07-19 RX ADMIN — Medication 60 MILLIGRAM(S): at 05:20

## 2022-07-19 RX ADMIN — PREGABALIN 1000 MICROGRAM(S): 225 CAPSULE ORAL at 11:33

## 2022-07-19 RX ADMIN — Medication 81 MILLIGRAM(S): at 11:33

## 2022-07-19 RX ADMIN — PANTOPRAZOLE SODIUM 40 MILLIGRAM(S): 20 TABLET, DELAYED RELEASE ORAL at 08:03

## 2022-07-19 RX ADMIN — CARVEDILOL PHOSPHATE 25 MILLIGRAM(S): 80 CAPSULE, EXTENDED RELEASE ORAL at 05:20

## 2022-07-19 RX ADMIN — Medication 650 MILLIGRAM(S): at 11:04

## 2022-07-19 NOTE — PROGRESS NOTE ADULT - ASSESSMENT
78 y/o F w PMH of HTN, glaucoma, depression, on home oxygen 2L and ESRD (TTS) presents to the ED for sob x3-4 days.  Patient states that she missed her last 2 dialysis sessions due to having diarrhea and vomiting since last week.  Patient states she also has been having a headache and feeling dizzy.  Denies any focal motor deficits or slurred speech. She fell yesterday without LOC or hitting her head.  Denies any chest pain or discomfort, denies any fevers, chills, cough or headache. Per EMS patient baseline of 3L O2 at home.  Upon arrival in ED, she has been put on BiPAP due to hypoxia.  -CXR shows fluid overload, but better than her last CXR per ED reading. Official report pending.  -Significant labs: K5.2, HCO3 21, Cr 13.4, Hb 7.5, BNP 70927.  -EK bpm, no acute ST-T changes.    Acute on chronic respiratory failure w/ hypoxia   secondary to fluid overload; missed HD   s/p ICU course w/ Bipap and IV Lasix   cont w/ volume maintenance with HD     ESRD on HD on TThS.  She has missed her last 2 runs of HD.  Pt is now agreeable to continue with HD inpatient, will continue as scheduled     Anemia due to ESRD.  s/p 1U pRBC   cont to monitor h/h Transfuse prn    HTN and Dyslipidemia.  Continue with Coreg, Clonidine, Hydralazine, Nifedipine, and Atorvastatin.    Dementia:   continue supportive care.     DVT prophylaxis: Heparin sq.    CODE STATUS: DNI/DNR.   Disposition: The patient and daughter have made the decision to discontinue HD on discharge, DTR will like Home with Hospice   f/u w/ Hospice consult      Plan discussed with DTR at the bedside.

## 2022-07-19 NOTE — PROGRESS NOTE ADULT - SUBJECTIVE AND OBJECTIVE BOX
Patient is a 77y old  Female who presents with a chief complaint of SOB. (18 Jul 2022 23:19)      INTERVAL HPI/ OVERNIGHT EVENTS: Pt was seen and examined at bedside today, No significant overnight events, pt denies any SOB, pain or any acute complaints.      MEDICATIONS  (STANDING):  albuterol/ipratropium for Nebulization 3 milliLiter(s) Nebulizer every 6 hours  aspirin  chewable 81 milliGRAM(s) Oral daily  atorvastatin 80 milliGRAM(s) Oral at bedtime  carvedilol 25 milliGRAM(s) Oral every 12 hours  chlorhexidine 2% Cloths 1 Application(s) Topical <User Schedule>  cloNIDine 0.2 milliGRAM(s) Oral two times a day  cyanocobalamin 1000 MICROGram(s) Oral daily  epoetin sergio-epbx (RETACRIT) Injectable 81487 Unit(s) IV Push <User Schedule>  hydrALAZINE 50 milliGRAM(s) Oral every 8 hours  NIFEdipine XL 60 milliGRAM(s) Oral daily  pantoprazole    Tablet 40 milliGRAM(s) Oral before breakfast    MEDICATIONS  (PRN):  acetaminophen     Tablet .. 650 milliGRAM(s) Oral every 6 hours PRN Temp greater or equal to 38C (100.4F), Mild Pain (1 - 3)  aluminum hydroxide/magnesium hydroxide/simethicone Suspension 30 milliLiter(s) Oral every 4 hours PRN Dyspepsia  loperamide 2 milliGRAM(s) Oral three times a day PRN Diarrhea  melatonin 3 milliGRAM(s) Oral at bedtime PRN Insomnia  ondansetron Injectable 4 milliGRAM(s) IV Push every 8 hours PRN Nausea and/or Vomiting      Allergies    No Known Drug Allergies  shellfish (Swelling)    Intolerances        REVIEW OF SYSTEMS:    Unable to examine due to [ ] Encephalopathy [ ] Advanced Dementia [ ] Expressive Aphasia [ ] Non-verbal patient    CONSTITUTIONAL: No fever, NO generalized weakness/Fatigue, No weight loss  EYES: No eye pain, visual disturbances, or discharge  ENMT:  No difficulty hearing, tinnitus, vertigo; No sinus or throat pain  NECK: No pain or stiffness  RESPIRATORY: NO shortness of breath (on Nasal canula), no cough, wheezing, sputum or hemoptysis   CARDIOVASCULAR: No chest pain, palpitations, or leg swelling  GASTROINTESTINAL: No abdominal pain. No nausea, vomiting, diarrhea or constipation. No melena or hematochezia.  GENITOURINARY: No dysuria, frequency, hematuria, or incontinence  NEUROLOGICAL: No headaches, Dizziness, memory loss, loss of strength, numbness, or tremors  SKIN: No itching, burning, rashes, or lesions   MUSCULOSKELETAL: No joint pain or swelling; No muscle, back, or extremity pain  PSYCHIATRIC: No depression, anxiety, mood swings, or difficulty sleeping  HEME/LYMPH: No easy bruising, or bleeding gums    Vital Signs Last 24 Hrs  T(C): 37.1 (19 Jul 2022 11:52), Max: 37.1 (18 Jul 2022 23:44)  T(F): 98.8 (19 Jul 2022 11:52), Max: 98.8 (18 Jul 2022 23:44)  HR: 66 (19 Jul 2022 11:52) (65 - 72)  BP: 133/79 (19 Jul 2022 11:52) (129/65 - 165/75)  BP(mean): --  RR: 17 (19 Jul 2022 11:52) (17 - 18)  SpO2: 98% (19 Jul 2022 11:52) (95% - 99%)    Parameters below as of 19 Jul 2022 11:52  Patient On (Oxygen Delivery Method): nasal cannula        PHYSICAL EXAM:  GENERAL: NAD on NC, Obese   HEAD:  Atraumatic, Normocephalic  EYES: conjunctiva and sclera clear  ENMT: Moist mucous membranes  NECK: Supple, No JVD, Normal thyroid  CHEST/LUNG: Clear to Auscultation bilaterally; No rales, rhonchi, wheezing, or rubs  HEART: Regular rate and rhythm; No murmurs, rubs, or gallops  ABDOMEN: Soft, Nontender, Nondistended; Bowel sounds present  EXTREMITIES:  2+ Peripheral Pulses, No clubbing, cyanosis, or edema  SKIN: No rashes or lesions  NERVOUS SYSTEM:  Alert & Oriented X3, Good concentration; Motor Strength 5/5 B/L upper and lower extremities    LABS:                        7.3    6.89  )-----------( 315      ( 18 Jul 2022 09:25 )             21.2     07-18    138  |  100  |  47<H>  ----------------------------<  117<H>  3.6   |  26  |  8.67<H>    Ca    8.7      18 Jul 2022 09:25          CAPILLARY BLOOD GLUCOSE              RADIOLOGY & ADDITIONAL TESTS:          Imaging Personally Reviewed:  [ ] YES  [ ] NO    Consultant(s) Notes Reviewed:  [ ] YES  [ ] NO    Care Discussed with Consultants/Other Providers [x ] YES  [ ] NO

## 2022-07-19 NOTE — PROGRESS NOTE ADULT - SUBJECTIVE AND OBJECTIVE BOX
[facility] NEPHROLOGY SERVICES, [facility]  NEPHROLOGY AND HYPERTENSION  [address]  [address]  [place] [address]  [phone]    MD MATTHEW PATIÑO MD ANDREY GONCHARUK, MD MADHU KORRAPATI, MD YELENA ROSENBERG, MD RON BRANCH, MD COLBY RODRIGUEZ MD          Patient events noted    MEDICATIONS  (STANDING):  aspirin  chewable 81 milliGRAM(s) Oral daily  atorvastatin 80 milliGRAM(s) Oral at bedtime  carvedilol 25 milliGRAM(s) Oral every 12 hours  chlorhexidine 2% Cloths 1 Application(s) Topical <User Schedule>  cloNIDine 0.2 milliGRAM(s) Oral two times a day  cyanocobalamin 1000 MICROGram(s) Oral daily  epoetin sergio-epbx (RETACRIT) Injectable 41215 Unit(s) IV Push <User Schedule>  hydrALAZINE 50 milliGRAM(s) Oral every 8 hours  NIFEdipine XL 60 milliGRAM(s) Oral daily  pantoprazole    Tablet 40 milliGRAM(s) Oral before breakfast    MEDICATIONS  (PRN):  acetaminophen     Tablet .. 650 milliGRAM(s) Oral every 6 hours PRN Temp greater or equal to 38C (100.4F), Mild Pain (1 - 3)  albuterol/ipratropium for Nebulization 3 milliLiter(s) Nebulizer every 6 hours PRN Shortness of Breath and/or Wheezing  aluminum hydroxide/magnesium hydroxide/simethicone Suspension 30 milliLiter(s) Oral every 4 hours PRN Dyspepsia  loperamide 2 milliGRAM(s) Oral three times a day PRN Diarrhea  melatonin 3 milliGRAM(s) Oral at bedtime PRN Insomnia  ondansetron Injectable 4 milliGRAM(s) IV Push every 8 hours PRN Nausea and/or Vomiting      07-18-22 @ 07:01  -  07-19-22 @ 07:00  --------------------------------------------------------  IN: 360 mL / OUT: 2000 mL / NET: -1640 mL      PHYSICAL EXAM:      T(C): 36.7 (07-19-22 @ 23:42), Max: 37.1 (07-19-22 @ 11:52)  HR: 60 (07-19-22 @ 23:42) (60 - 95)  BP: 183/76 (07-19-22 @ 23:42) (129/65 - 196/75)  RR: 17 (07-19-22 @ 23:42) (17 - 18)  SpO2: 100% (07-19-22 @ 23:42) (98% - 100%)  Wt(kg): --  Lungs clear  Heart S1S2  Abd soft NT ND  Extremities:   tr edema                                    7.3    6.89  )-----------( 315      ( 18 Jul 2022 09:25 )             21.2     07-18    138  |  100  |  47<H>  ----------------------------<  117<H>  3.6   |  26  |  8.67<H>    Ca    8.7      18 Jul 2022 09:25          Creatinine Trend: 8.67<--, 5.30<--, 7.75<--, 14.00<--, 13.40<--, 4.87<--      Assessment   ESRD: maintenance HD    Plan:  HD for tomorrow  UF as tolerated   Outpatient follow up at [facility] HD center       Harry Osborn MD

## 2022-07-20 LAB
ANION GAP SERPL CALC-SCNC: 8 MMOL/L — SIGNIFICANT CHANGE UP (ref 5–17)
BUN SERPL-MCNC: 33 MG/DL — HIGH (ref 7–23)
CALCIUM SERPL-MCNC: 9.1 MG/DL — SIGNIFICANT CHANGE UP (ref 8.5–10.1)
CHLORIDE SERPL-SCNC: 101 MMOL/L — SIGNIFICANT CHANGE UP (ref 96–108)
CO2 SERPL-SCNC: 29 MMOL/L — SIGNIFICANT CHANGE UP (ref 22–31)
CREAT SERPL-MCNC: 6.49 MG/DL — HIGH (ref 0.5–1.3)
EGFR: 6 ML/MIN/1.73M2 — LOW
FLUAV AG NPH QL: SIGNIFICANT CHANGE UP
FLUBV AG NPH QL: SIGNIFICANT CHANGE UP
GLUCOSE SERPL-MCNC: 107 MG/DL — HIGH (ref 70–99)
HCT VFR BLD CALC: 22 % — LOW (ref 34.5–45)
HGB BLD-MCNC: 7.2 G/DL — LOW (ref 11.5–15.5)
MCHC RBC-ENTMCNC: 28.2 PG — SIGNIFICANT CHANGE UP (ref 27–34)
MCHC RBC-ENTMCNC: 32.7 G/DL — SIGNIFICANT CHANGE UP (ref 32–36)
MCV RBC AUTO: 86.3 FL — SIGNIFICANT CHANGE UP (ref 80–100)
NRBC # BLD: 0 /100 WBCS — SIGNIFICANT CHANGE UP (ref 0–0)
PLATELET # BLD AUTO: 286 K/UL — SIGNIFICANT CHANGE UP (ref 150–400)
POTASSIUM SERPL-MCNC: 4.4 MMOL/L — SIGNIFICANT CHANGE UP (ref 3.5–5.3)
POTASSIUM SERPL-SCNC: 4.4 MMOL/L — SIGNIFICANT CHANGE UP (ref 3.5–5.3)
RBC # BLD: 2.55 M/UL — LOW (ref 3.8–5.2)
RBC # FLD: 14.2 % — SIGNIFICANT CHANGE UP (ref 10.3–14.5)
SARS-COV-2 RNA SPEC QL NAA+PROBE: SIGNIFICANT CHANGE UP
SODIUM SERPL-SCNC: 138 MMOL/L — SIGNIFICANT CHANGE UP (ref 135–145)
WBC # BLD: 6.84 K/UL — SIGNIFICANT CHANGE UP (ref 3.8–10.5)
WBC # FLD AUTO: 6.84 K/UL — SIGNIFICANT CHANGE UP (ref 3.8–10.5)

## 2022-07-20 PROCEDURE — 99233 SBSQ HOSP IP/OBS HIGH 50: CPT

## 2022-07-20 RX ADMIN — Medication 81 MILLIGRAM(S): at 14:11

## 2022-07-20 RX ADMIN — Medication 60 MILLIGRAM(S): at 05:21

## 2022-07-20 RX ADMIN — PREGABALIN 1000 MICROGRAM(S): 225 CAPSULE ORAL at 14:11

## 2022-07-20 RX ADMIN — CHLORHEXIDINE GLUCONATE 1 APPLICATION(S): 213 SOLUTION TOPICAL at 06:06

## 2022-07-20 RX ADMIN — PANTOPRAZOLE SODIUM 40 MILLIGRAM(S): 20 TABLET, DELAYED RELEASE ORAL at 07:52

## 2022-07-20 RX ADMIN — ERYTHROPOIETIN 10000 UNIT(S): 10000 INJECTION, SOLUTION INTRAVENOUS; SUBCUTANEOUS at 10:45

## 2022-07-20 RX ADMIN — Medication 50 MILLIGRAM(S): at 21:19

## 2022-07-20 RX ADMIN — Medication 0.2 MILLIGRAM(S): at 17:19

## 2022-07-20 RX ADMIN — Medication 0.2 MILLIGRAM(S): at 05:21

## 2022-07-20 RX ADMIN — CARVEDILOL PHOSPHATE 25 MILLIGRAM(S): 80 CAPSULE, EXTENDED RELEASE ORAL at 17:19

## 2022-07-20 RX ADMIN — Medication 50 MILLIGRAM(S): at 05:21

## 2022-07-20 RX ADMIN — Medication 100 MILLIGRAM(S): at 21:19

## 2022-07-20 RX ADMIN — CARVEDILOL PHOSPHATE 25 MILLIGRAM(S): 80 CAPSULE, EXTENDED RELEASE ORAL at 05:21

## 2022-07-20 RX ADMIN — Medication 50 MILLIGRAM(S): at 14:11

## 2022-07-20 RX ADMIN — ATORVASTATIN CALCIUM 80 MILLIGRAM(S): 80 TABLET, FILM COATED ORAL at 21:19

## 2022-07-20 NOTE — PROGRESS NOTE ADULT - ASSESSMENT
76 y/o F w PMH of HTN, glaucoma, depression, on home oxygen 2L and ESRD (TTS) presents to the ED for sob x3-4 days.  Patient states that she missed her last 2 dialysis sessions due to having diarrhea and vomiting since last week.  Patient states she also has been having a headache and feeling dizzy.  Denies any focal motor deficits or slurred speech. She fell yesterday without LOC or hitting her head.  Denies any chest pain or discomfort, denies any fevers, chills, cough or headache. Per EMS patient baseline of 3L O2 at home.  Upon arrival in ED, she has been put on BiPAP due to hypoxia.  -CXR shows fluid overload, but better than her last CXR per ED reading. Official report pending.  -Significant labs: K5.2, HCO3 21, Cr 13.4, Hb 7.5, BNP 55922.  -EK bpm, no acute ST-T changes.    Acute on chronic respiratory failure w/ hypoxia   secondary to fluid overload; missed HD   s/p ICU course w/ Bipap and IV Lasix   cont w/ volume maintenance with HD   on current baseline oxygen     ESRD on HD on TThS.  continue per renal    Anemia due to ESRD.  s/p 1U pRBC  on 2022   hgb appears live around 7/8  cont to monitor h/h Transfuse prn    HTN and Dyslipidemia.  Continue with Coreg, Clonidine, Hydralazine, Nifedipine, and Atorvastatin.    Dementia:   continue supportive care.     DVT prophylaxis: Heparin sq.    CODE STATUS: DNI/DNR.   Disposition: per my colleagues documentation the patient and daughter have made the decision to discontinue HD on discharge, DTR will like Home with Hospice   f/u w/ Hospice consult  -->pending               78 y/o F w PMH of HTN, glaucoma, depression, on home oxygen 2L and ESRD (TTS) presents to the ED for sob x3-4 days.  Patient states that she missed her last 2 dialysis sessions due to having diarrhea and vomiting since last week.  Patient states she also has been having a headache and feeling dizzy.  Denies any focal motor deficits or slurred speech. She fell yesterday without LOC or hitting her head.  Denies any chest pain or discomfort, denies any fevers, chills, cough or headache. Per EMS patient baseline of 3L O2 at home.  Upon arrival in ED, she has been put on BiPAP due to hypoxia.  -CXR shows fluid overload, but better than her last CXR per ED reading. Official report pending.  -Significant labs: K5.2, HCO3 21, Cr 13.4, Hb 7.5, BNP 80464.  -EK bpm, no acute ST-T changes.    Acute on chronic respiratory failure w/ hypoxia   secondary to fluid overload; missed HD   s/p ICU course w/ Bipap and IV Lasix   cont w/ volume maintenance with HD   on current baseline oxygen     ESRD on HD on TThS.  continue per renal    Anemia due to ESRD.  s/p 1U pRBC  on 2022   hgb appears live around 7/8  cont to monitor h/h Transfuse prn continue with epogen     HTN and Dyslipidemia.  Continue with Coreg, Clonidine, Hydralazine, Nifedipine, and Atorvastatin.    Dementia:   continue supportive care.     DVT prophylaxis: Heparin sq.    CODE STATUS: DNI/DNR.   Disposition: per my colleagues documentation the patient and daughter have made the decision to discontinue HD on discharge, DTR will like Home with Hospice   f/u w/ Hospice consult  -->pending               negative...

## 2022-07-20 NOTE — PROGRESS NOTE ADULT - SUBJECTIVE AND OBJECTIVE BOX
[facility] NEPHROLOGY SERVICES, [facility]  NEPHROLOGY AND HYPERTENSION  [address]  [address]  [place] [address]  [phone]    MD MATTHEW PATIÑO MD ANDREY GONCHARUK, MD MADHU KORRAPATI, MD YELENA ROSENBERG, MD RON BRANCH, MD COLBY RODRIGUEZ MD          Patient events noted    MEDICATIONS  (STANDING):  aspirin  chewable 81 milliGRAM(s) Oral daily  atorvastatin 80 milliGRAM(s) Oral at bedtime  carvedilol 25 milliGRAM(s) Oral every 12 hours  chlorhexidine 2% Cloths 1 Application(s) Topical <User Schedule>  cloNIDine 0.2 milliGRAM(s) Oral two times a day  cyanocobalamin 1000 MICROGram(s) Oral daily  epoetin sergio-epbx (RETACRIT) Injectable 70532 Unit(s) IV Push <User Schedule>  hydrALAZINE 50 milliGRAM(s) Oral every 8 hours  NIFEdipine XL 60 milliGRAM(s) Oral daily  pantoprazole    Tablet 40 milliGRAM(s) Oral before breakfast    MEDICATIONS  (PRN):  acetaminophen     Tablet .. 650 milliGRAM(s) Oral every 6 hours PRN Temp greater or equal to 38C (100.4F), Mild Pain (1 - 3)  albuterol/ipratropium for Nebulization 3 milliLiter(s) Nebulizer every 6 hours PRN Shortness of Breath and/or Wheezing  aluminum hydroxide/magnesium hydroxide/simethicone Suspension 30 milliLiter(s) Oral every 4 hours PRN Dyspepsia  loperamide 2 milliGRAM(s) Oral three times a day PRN Diarrhea  melatonin 3 milliGRAM(s) Oral at bedtime PRN Insomnia  ondansetron Injectable 4 milliGRAM(s) IV Push every 8 hours PRN Nausea and/or Vomiting      07-20-22 @ 07:01  -  07-20-22 @ 18:34  --------------------------------------------------------  IN: 240 mL / OUT: 2500 mL / NET: -2260 mL      PHYSICAL EXAM:      T(C): 36.8 (07-20-22 @ 17:21), Max: 37 (07-20-22 @ 13:08)  HR: 66 (07-20-22 @ 17:21) (57 - 82)  BP: 114/64 (07-20-22 @ 17:21) (114/64 - 183/76)  RR: 18 (07-20-22 @ 17:21) (16 - 20)  SpO2: 97% (07-20-22 @ 17:21) (97% - 100%)  Wt(kg): --  Lungs clear  Heart S1S2  Abd soft NT ND  Extremities:   tr edema                                    7.2    6.84  )-----------( 286      ( 20 Jul 2022 07:20 )             22.0     07-20    138  |  101  |  33<H>  ----------------------------<  107<H>  4.4   |  29  |  6.49<H>    Ca    9.1      20 Jul 2022 07:20          Creatinine Trend: 6.49<--, 8.67<--, 5.30<--, 7.75<--, 14.00<--, 13.40<--      Assessment   ESRD, HTN; fluid overload; HD nonadherence   Better    Plan:  HD for today  UF 3 kg  Retacit support     Harry Osborn MD

## 2022-07-20 NOTE — DIETITIAN INITIAL EVALUATION ADULT - PERTINENT LABORATORY DATA
07-20    138  |  101  |  33<H>  ----------------------------<  107<H>  4.4   |  29  |  6.49<H>    Ca    9.1      20 Jul 2022 07:20

## 2022-07-20 NOTE — DIETITIAN INITIAL EVALUATION ADULT - PERTINENT MEDS FT
MEDICATIONS  (STANDING):  aspirin  chewable 81 milliGRAM(s) Oral daily  atorvastatin 80 milliGRAM(s) Oral at bedtime  carvedilol 25 milliGRAM(s) Oral every 12 hours  chlorhexidine 2% Cloths 1 Application(s) Topical <User Schedule>  cloNIDine 0.2 milliGRAM(s) Oral two times a day  cyanocobalamin 1000 MICROGram(s) Oral daily  epoetin sergio-epbx (RETACRIT) Injectable 02093 Unit(s) IV Push <User Schedule>  hydrALAZINE 50 milliGRAM(s) Oral every 8 hours  NIFEdipine XL 60 milliGRAM(s) Oral daily  pantoprazole    Tablet 40 milliGRAM(s) Oral before breakfast    MEDICATIONS  (PRN):  acetaminophen     Tablet .. 650 milliGRAM(s) Oral every 6 hours PRN Temp greater or equal to 38C (100.4F), Mild Pain (1 - 3)  albuterol/ipratropium for Nebulization 3 milliLiter(s) Nebulizer every 6 hours PRN Shortness of Breath and/or Wheezing  aluminum hydroxide/magnesium hydroxide/simethicone Suspension 30 milliLiter(s) Oral every 4 hours PRN Dyspepsia  loperamide 2 milliGRAM(s) Oral three times a day PRN Diarrhea  melatonin 3 milliGRAM(s) Oral at bedtime PRN Insomnia  ondansetron Injectable 4 milliGRAM(s) IV Push every 8 hours PRN Nausea and/or Vomiting

## 2022-07-20 NOTE — DIETITIAN NUTRITION RISK NOTIFICATION - TREATMENT: THE FOLLOWING DIET HAS BEEN RECOMMENDED
Diet, Renal Restrictions:   For patients receiving Renal Replacement - No Protein Restr, No Conc K, No Conc Phos, Low Sodium  No Shellfish  Supplement Feeding Modality:  Oral  Ensure Enlive Cans or Servings Per Day:  1       Frequency:  Daily (07-20-22 @ 10:54) [Pending Verification By Attending]

## 2022-07-20 NOTE — DIETITIAN INITIAL EVALUATION ADULT - OTHER INFO
Pt seen laying in bed, appears obese with BMI 30. Pt lives with daughter who does food shopping/cooking. Pt reports following regular diet at home but avoids concentrated sweets and foods high in potassium and phosphorus. Pt on chronic HD; however expressed interest in discontinuing upon discharge. Hospice care following. Pt reports eating well PTA but now has poor appetite at this time, stating PO intake < 50% for meals. Pt denies chewing/swallowing difficulty; no c/o nausea, vomiting, diarrhea or constipation currently. Pt confirms allergy to shellfish. Pt states she has h/o DM but is no longer taking medication but SMBG 3x week. Stating previous HbA1C was below 7.0% at her primary care physician's office. Pt reports no recent change in weight. UBW of 155 lbs. as per previous admission (06/30). Weights discussed below.  Pt seen laying in bed, appears obese with BMI 30. Pt lives with daughter who does food shopping/cooking. Pt reports following regular diet at home but avoids concentrated sweets and foods high in potassium and phosphorus. Pt on chronic HD; however expressed interest in discontinuing upon discharge. Hospice care following. Pt reports eating well PTA but now has poor appetite at this time, stating PO intake < 50% for meals. Pt refused Nepro supplement, stating it makes her "sick", amenable to Ensure as alternative. Pt denies chewing/swallowing difficulty; no c/o nausea, vomiting, diarrhea or constipation currently. Pt confirms allergy to shellfish. Pt states she has h/o DM but is no longer taking medication but SMBG 3x week. Stating previous HbA1C was below 7.0% at her primary care physician's office. Pt reports no recent change in weight. UBW of 155 lbs. as per previous admission (06/30). Weights discussed below.

## 2022-07-20 NOTE — DIETITIAN INITIAL EVALUATION ADULT - NS FNS DIET ORDER
Diet, Renal Restrictions:   For patients receiving Renal Replacement - No Protein Restr, No Conc K, No Conc Phos, Low Sodium (07-15-22 @ 15:44)

## 2022-07-20 NOTE — DIETITIAN INITIAL EVALUATION ADULT - OTHER CALCULATIONS
Ht (cm): 154.9  Wt (kg): 73.7   BMI: 30.7    IBW: 47.7 kg +/-10%                    %IBW: 154%                     UBW: 70.5 kg                  %UBW: 104%

## 2022-07-20 NOTE — PROGRESS NOTE ADULT - SUBJECTIVE AND OBJECTIVE BOX
Patient is a 77y old  Female who presents with a chief complaint of SOB. (18 Jul 2022 23:19)      INTERVAL HPI/ OVERNIGHT EVENTS: Pt was seen and examined at bedside today, No significant overnight events, pt denies any SOB, pain or any acute complaints.      MEDICATIONS  (STANDING):  aspirin  chewable 81 milliGRAM(s) Oral daily  atorvastatin 80 milliGRAM(s) Oral at bedtime  carvedilol 25 milliGRAM(s) Oral every 12 hours  chlorhexidine 2% Cloths 1 Application(s) Topical <User Schedule>  cloNIDine 0.2 milliGRAM(s) Oral two times a day  cyanocobalamin 1000 MICROGram(s) Oral daily  epoetin sergio-epbx (RETACRIT) Injectable 18493 Unit(s) IV Push <User Schedule>  hydrALAZINE 50 milliGRAM(s) Oral every 8 hours  NIFEdipine XL 60 milliGRAM(s) Oral daily  pantoprazole    Tablet 40 milliGRAM(s) Oral before breakfast    MEDICATIONS  (PRN):  acetaminophen     Tablet .. 650 milliGRAM(s) Oral every 6 hours PRN Temp greater or equal to 38C (100.4F), Mild Pain (1 - 3)  albuterol/ipratropium for Nebulization 3 milliLiter(s) Nebulizer every 6 hours PRN Shortness of Breath and/or Wheezing  aluminum hydroxide/magnesium hydroxide/simethicone Suspension 30 milliLiter(s) Oral every 4 hours PRN Dyspepsia  loperamide 2 milliGRAM(s) Oral three times a day PRN Diarrhea  melatonin 3 milliGRAM(s) Oral at bedtime PRN Insomnia  ondansetron Injectable 4 milliGRAM(s) IV Push every 8 hours PRN Nausea and/or Vomiting    Allergies    No Known Drug Allergies  shellfish (Swelling)    Intolerances    Vital Signs Last 24 Hrs  T(C): 36.7 (20 Jul 2022 09:25), Max: 36.7 (19 Jul 2022 14:01)  T(F): 98.1 (20 Jul 2022 09:25), Max: 98.1 (19 Jul 2022 14:01)  HR: 62 (20 Jul 2022 09:25) (57 - 95)  BP: 174/81 (20 Jul 2022 09:25) (142/69 - 196/75)  BP(mean): --  RR: 20 (20 Jul 2022 09:25) (16 - 20)  SpO2: 99% (20 Jul 2022 09:25) (98% - 100%)    Parameters below as of 20 Jul 2022 09:25  Patient On (Oxygen Delivery Method): nasal cannula  O2 Flow (L/min): 2      PHYSICAL EXAM:  GENERAL: NAD on NC, Obese   HEAD:  Atraumatic, Normocephalic  EYES: conjunctiva and sclera clear  ENMT: Moist mucous membranes  NECK: Supple,   CHEST/LUNG: Clear to Auscultation bilaterally; No rales, rhonchi, wheezing, or rubs  HEART: Regular rate and rhythm; No murmurs, rubs, or gallops  ABDOMEN: Soft, Nontender, Nondistended; Bowel sounds present  EXTREMITIES:  2+ Peripheral Pulses, No clubbing, cyanosis, or edema  SKIN: No rashes or lesions  NERVOUS SYSTEM:  Alert & Oriented X3, Good concentration; Motor Strength 5/5 B/L upper and lower extremities    LABS:                            7.2    6.84  )-----------( 286      ( 20 Jul 2022 07:20 )             22.0   07-20    138  |  101  |  33<H>  ----------------------------<  107<H>  4.4   |  29  |  6.49<H>    Ca    9.1      20 Jul 2022 07:20            CAPILLARY BLOOD GLUCOSE              RADIOLOGY & ADDITIONAL TESTS:          Imaging Personally Reviewed:  [ ] YES  [ ] NO    Consultant(s) Notes Reviewed:  [ ] YES  [ ] NO    Care Discussed with Consultants/Other Providers [x ] YES  [ ] NO

## 2022-07-21 ENCOUNTER — TRANSCRIPTION ENCOUNTER (OUTPATIENT)
Age: 77
End: 2022-07-21

## 2022-07-21 PROCEDURE — 99232 SBSQ HOSP IP/OBS MODERATE 35: CPT

## 2022-07-21 RX ORDER — NIFEDIPINE 30 MG
90 TABLET, EXTENDED RELEASE 24 HR ORAL DAILY
Refills: 0 | Status: DISCONTINUED | OUTPATIENT
Start: 2022-07-22 | End: 2022-07-22

## 2022-07-21 RX ADMIN — CARVEDILOL PHOSPHATE 25 MILLIGRAM(S): 80 CAPSULE, EXTENDED RELEASE ORAL at 06:13

## 2022-07-21 RX ADMIN — Medication 0.2 MILLIGRAM(S): at 06:13

## 2022-07-21 RX ADMIN — Medication 81 MILLIGRAM(S): at 12:52

## 2022-07-21 RX ADMIN — Medication 50 MILLIGRAM(S): at 21:48

## 2022-07-21 RX ADMIN — CHLORHEXIDINE GLUCONATE 1 APPLICATION(S): 213 SOLUTION TOPICAL at 06:14

## 2022-07-21 RX ADMIN — ATORVASTATIN CALCIUM 80 MILLIGRAM(S): 80 TABLET, FILM COATED ORAL at 21:48

## 2022-07-21 RX ADMIN — PREGABALIN 1000 MICROGRAM(S): 225 CAPSULE ORAL at 12:53

## 2022-07-21 RX ADMIN — Medication 0.2 MILLIGRAM(S): at 18:29

## 2022-07-21 RX ADMIN — Medication 50 MILLIGRAM(S): at 15:16

## 2022-07-21 RX ADMIN — Medication 50 MILLIGRAM(S): at 06:13

## 2022-07-21 RX ADMIN — CARVEDILOL PHOSPHATE 25 MILLIGRAM(S): 80 CAPSULE, EXTENDED RELEASE ORAL at 15:16

## 2022-07-21 RX ADMIN — Medication 60 MILLIGRAM(S): at 06:13

## 2022-07-21 NOTE — DISCHARGE NOTE PROVIDER - DETAILS OF MALNUTRITION DIAGNOSIS/DIAGNOSES
This patient has been assessed with a concern for Malnutrition and was treated during this hospitalization for the following Nutrition diagnosis/diagnoses:     -  07/20/2022: Moderate protein-calorie malnutrition

## 2022-07-21 NOTE — PROGRESS NOTE ADULT - SUBJECTIVE AND OBJECTIVE BOX
[facility] NEPHROLOGY SERVICES, [facility]  NEPHROLOGY AND HYPERTENSION  [address]  [address]  [place] [address]  [phone]    MD MATTHEW PATIÑO MD ANDREY GONCHARUK, MD MADHU KORRAPATI, MD YELENA ROSENBERG, MD BINNY KOSHY, MD CHRISTOPHER CAPUTO, MD COLBY RODRIGUEZ MD          Patient events noted    MEDICATIONS  (STANDING):  aspirin  chewable 81 milliGRAM(s) Oral daily  atorvastatin 80 milliGRAM(s) Oral at bedtime  carvedilol 25 milliGRAM(s) Oral every 12 hours  chlorhexidine 2% Cloths 1 Application(s) Topical <User Schedule>  cloNIDine 0.2 milliGRAM(s) Oral two times a day  cyanocobalamin 1000 MICROGram(s) Oral daily  epoetin sergio-epbx (RETACRIT) Injectable 06107 Unit(s) IV Push <User Schedule>  hydrALAZINE 50 milliGRAM(s) Oral every 8 hours  pantoprazole    Tablet 40 milliGRAM(s) Oral before breakfast    MEDICATIONS  (PRN):  acetaminophen     Tablet .. 650 milliGRAM(s) Oral every 6 hours PRN Temp greater or equal to 38C (100.4F), Mild Pain (1 - 3)  albuterol/ipratropium for Nebulization 3 milliLiter(s) Nebulizer every 6 hours PRN Shortness of Breath and/or Wheezing  aluminum hydroxide/magnesium hydroxide/simethicone Suspension 30 milliLiter(s) Oral every 4 hours PRN Dyspepsia  guaiFENesin Oral Liquid (Sugar-Free) 100 milliGRAM(s) Oral every 6 hours PRN Cough  loperamide 2 milliGRAM(s) Oral three times a day PRN Diarrhea  melatonin 3 milliGRAM(s) Oral at bedtime PRN Insomnia  ondansetron Injectable 4 milliGRAM(s) IV Push every 8 hours PRN Nausea and/or Vomiting      07-20-22 @ 07:01  -  07-21-22 @ 07:00  --------------------------------------------------------  IN: 240 mL / OUT: 2500 mL / NET: -2260 mL      PHYSICAL EXAM:      T(C): 36.6 (07-21-22 @ 11:17), Max: 36.9 (07-20-22 @ 23:52)  HR: 65 (07-21-22 @ 11:17) (64 - 67)  BP: 162/72 (07-21-22 @ 11:17) (114/64 - 176/66)  RR: 18 (07-21-22 @ 11:17) (16 - 18)  SpO2: 97% (07-21-22 @ 11:17) (94% - 97%)  Wt(kg): --  Lungs clear  Heart S1S2  Abd soft NT ND  Extremities:   tr edema                                    7.2    6.84  )-----------( 286      ( 20 Jul 2022 07:20 )             22.0     07-20    138  |  101  |  33<H>  ----------------------------<  107<H>  4.4   |  29  |  6.49<H>    Ca    9.1      20 Jul 2022 07:20          Creatinine Trend: 6.49<--, 8.67<--, 5.30<--, 7.75<--, 14.00<--, 13.40<--      Assessment   ESRD, maintenance HD    Plan:  Patient and daughter deciding on hospice and HD withdraw    Harry Osborn MD

## 2022-07-21 NOTE — PROGRESS NOTE ADULT - SUBJECTIVE AND OBJECTIVE BOX
Patient is a 77y old  Female who presents with a chief complaint of SOB. (18 Jul 2022 23:19)      INTERVAL HPI/ OVERNIGHT EVENTS: Pt was seen and examined at bedside today, No significant overnight events, pt denies any SOB, pain or any acute complaints.      MEDICATIONS  (STANDING):  aspirin  chewable 81 milliGRAM(s) Oral daily  atorvastatin 80 milliGRAM(s) Oral at bedtime  carvedilol 25 milliGRAM(s) Oral every 12 hours  chlorhexidine 2% Cloths 1 Application(s) Topical <User Schedule>  cloNIDine 0.2 milliGRAM(s) Oral two times a day  cyanocobalamin 1000 MICROGram(s) Oral daily  epoetin sergio-epbx (RETACRIT) Injectable 05800 Unit(s) IV Push <User Schedule>  hydrALAZINE 50 milliGRAM(s) Oral every 8 hours  NIFEdipine XL 60 milliGRAM(s) Oral daily  pantoprazole    Tablet 40 milliGRAM(s) Oral before breakfast    MEDICATIONS  (PRN):  acetaminophen     Tablet .. 650 milliGRAM(s) Oral every 6 hours PRN Temp greater or equal to 38C (100.4F), Mild Pain (1 - 3)  albuterol/ipratropium for Nebulization 3 milliLiter(s) Nebulizer every 6 hours PRN Shortness of Breath and/or Wheezing  aluminum hydroxide/magnesium hydroxide/simethicone Suspension 30 milliLiter(s) Oral every 4 hours PRN Dyspepsia  guaiFENesin Oral Liquid (Sugar-Free) 100 milliGRAM(s) Oral every 6 hours PRN Cough  loperamide 2 milliGRAM(s) Oral three times a day PRN Diarrhea  melatonin 3 milliGRAM(s) Oral at bedtime PRN Insomnia  ondansetron Injectable 4 milliGRAM(s) IV Push every 8 hours PRN Nausea and/or Vomiting      Allergies    No Known Drug Allergies  shellfish (Swelling)    Intolerances    Vital Signs Last 24 Hrs  T(C): 36.6 (21 Jul 2022 11:17), Max: 37 (20 Jul 2022 13:08)  T(F): 97.8 (21 Jul 2022 11:17), Max: 98.6 (20 Jul 2022 13:08)  HR: 65 (21 Jul 2022 11:17) (64 - 67)  BP: 162/72 (21 Jul 2022 11:17) (114/64 - 176/66)  BP(mean): --  RR: 18 (21 Jul 2022 11:17) (16 - 18)  SpO2: 97% (21 Jul 2022 11:17) (94% - 98%)    Parameters below as of 21 Jul 2022 11:17  Patient On (Oxygen Delivery Method): room air        PHYSICAL EXAM:  GENERAL: NAD on NC, Obese   HEAD:  Atraumatic, Normocephalic  EYES: conjunctiva and sclera clear  ENMT: Moist mucous membranes  NECK: Supple,   CHEST/LUNG: Clear to Auscultation bilaterally; No rales, rhonchi, wheezing, or rubs  HEART: Regular rate and rhythm; No murmurs, rubs, or gallops  ABDOMEN: Soft, Nontender, Nondistended; Bowel sounds present  EXTREMITIES:  2+ Peripheral Pulses, No clubbing, cyanosis, or edema  SKIN: No rashes or lesions  NERVOUS SYSTEM:  Alert & Oriented X3, Good concentration; Motor Strength 5/5 B/L upper and lower extremities    LABS:                                           7.2    6.84  )-----------( 286      ( 20 Jul 2022 07:20 )             22.0             CAPILLARY BLOOD GLUCOSE              RADIOLOGY & ADDITIONAL TESTS:          Imaging Personally Reviewed:  [ ] YES  [ ] NO    Consultant(s) Notes Reviewed:  [ ] YES  [ ] NO    Care Discussed with Consultants/Other Providers [x ] YES  [ ] NO

## 2022-07-21 NOTE — DISCHARGE NOTE PROVIDER - CARE PROVIDERS DIRECT ADDRESSES
,DirectAddress_Unknown ,DirectAddress_Unknown,[email or web address]

## 2022-07-21 NOTE — PROGRESS NOTE ADULT - PROVIDER SPECIALTY LIST ADULT
Critical Care
Hospitalist
Nephrology
Hospitalist
Nephrology
Nephrology
Hospitalist
Nephrology
Hospitalist
Hospitalist

## 2022-07-21 NOTE — DISCHARGE NOTE PROVIDER - CARE PROVIDER_API CALL
follow up hospice doc,   Phone: (   )    -  Fax: (   )    -  Follow Up Time:    follow up hospice doc,   Phone: (   )    -  Fax: (   )    -  Follow Up Time:     Harry Osborn  INTERNAL MEDICINE  [address], [address] [place] [id number]  Phone: [phone]  Fax: [phone]  Follow Up Time:

## 2022-07-21 NOTE — DISCHARGE NOTE PROVIDER - NSDCCPCAREPLAN_GEN_ALL_CORE_FT
PRINCIPAL DISCHARGE DIAGNOSIS  Diagnosis: Acute respiratory failure  Assessment and Plan of Treatment:       SECONDARY DISCHARGE DIAGNOSES  Diagnosis: Essential hypertension  Assessment and Plan of Treatment:     Diagnosis: HLD (hyperlipidemia)  Assessment and Plan of Treatment:     Diagnosis: ESRD on dialysis  Assessment and Plan of Treatment:     Diagnosis: Dementia  Assessment and Plan of Treatment:

## 2022-07-21 NOTE — DISCHARGE NOTE PROVIDER - HOSPITAL COURSE
Assessment and Plan:   · Assessment	  76 y/o F w PMH of HTN, glaucoma, depression, on home oxygen 2L and ESRD (TTS) presents to the ED for sob x3-4 days.  Patient states that she missed her last 2 dialysis sessions due to having diarrhea and vomiting since last week.  Patient states she also has been having a headache and feeling dizzy.  Denies any focal motor deficits or slurred speech. She fell yesterday without LOC or hitting her head.  Denies any chest pain or discomfort, denies any fevers, chills, cough or headache. Per EMS patient baseline of 3L O2 at home.  Upon arrival in ED, she has been put on BiPAP due to hypoxia.  -CXR shows fluid overload, but better than her last CXR per ED reading. Official report pending.  -Significant labs: K5.2, HCO3 21, Cr 13.4, Hb 7.5, BNP 56902.  -EK bpm, no acute ST-T changes.    Acute on chronic respiratory failure w/ hypoxia   secondary to fluid overload; missed HD   s/p ICU course w/ Bipap and IV Lasix   cont w/ volume maintenance with HD   on current baseline oxygen     ESRD on HD on TThS.  continue per renal    Anemia due to ESRD.  s/p 1U pRBC  on 2022   hgb appears live around 7/8  cont to monitor h/h Transfuse prn continue with epogen     HTN and Dyslipidemia.  Continue with Coreg, Clonidine, Hydralazine, Nifedipine, and Atorvastatin.   will increase Nifedipine to 90mg    Dementia:   continue supportive care.     DVT prophylaxis: Heparin sq.    CODE STATUS: DNI/DNR.   Disposition: per my colleagues documentation the patient and daughter have made the decision to discontinue HD on discharge, DTR will like Home with Hospice   f/u w/ Hospice consult  -->pending  2022, on 2022  i d/w patient and her daughter shree about their decision for home hospice- including their decision to sto duialysis. explained that Death-will likely occur soon . they both  understood . await review of case by hospice-md and acceptance               Nutritional Assessment:  · Nutritional Assessment	This patient has been assessed with a concern for Malnutrition and has been determined to have a diagnosis/diagnoses of Moderate protein-calorie malnutrition.    This patient is being managed with:   Diet Renal Restrictions-  For patients receiving Renal Replacement - No Protein Restr No Conc K No Conc Phos Low Sodium  No Shellfish  Supplement Feeding Modality:  Oral  Ensure Enlive Cans or Servings Per Day:  1       Frequency:  Daily  Entered: 2022  8:45AM         Assessment and Plan:   · Assessment	  76 y/o F w PMH of HTN, glaucoma, depression, on home oxygen 2L and ESRD (TTS) presents to the ED for sob x3-4 days.  Patient states that she missed her last 2 dialysis sessions due to having diarrhea and vomiting since last week.  Patient states she also has been having a headache and feeling dizzy.  Denies any focal motor deficits or slurred speech. She fell yesterday without LOC or hitting her head.  Denies any chest pain or discomfort, denies any fevers, chills, cough or headache. Per EMS patient baseline of 3L O2 at home.  Upon arrival in ED, she has been put on BiPAP due to hypoxia.  -CXR shows fluid overload, but better than her last CXR per ED reading. Official report pending.  -Significant labs: K5.2, HCO3 21, Cr 13.4, Hb 7.5, BNP 10707.  -EK bpm, no acute ST-T changes.    Acute on chronic respiratory failure w/ hypoxia   secondary to fluid overload; missed HD   s/p ICU course w/ Bipap and IV Lasix   cont w/ volume maintenance with HD   on current baseline oxygen     ESRD on HD on TThS.  continue per renal    Anemia due to ESRD.  s/p 1U pRBC  on 2022   hgb appears live around 7/8  cont to monitor h/h Transfuse prn continue with epogen     HTN and Dyslipidemia.  Continue with Coreg, Clonidine, Hydralazine, Nifedipine, and Atorvastatin.   will increase Nifedipine to 90mg    Dementia:   continue supportive care.     DVT prophylaxis: Heparin sq.    CODE STATUS: DNI/DNR.   Disposition: no hospice will have outpatient dialysis tomorrow   2022, on 2022  i d/w patient and her daughter shree about their decision for home hospice- including their decision to sto duialysis. explained that Death-will likely occur soon . they both  understood . await review of case by hospice-md and acceptance               Nutritional Assessment:  · Nutritional Assessment	This patient has been assessed with a concern for Malnutrition and has been determined to have a diagnosis/diagnoses of Moderate protein-calorie malnutrition.    This patient is being managed with:   Diet Renal Restrictions-  For patients receiving Renal Replacement - No Protein Restr No Conc K No Conc Phos Low Sodium  No Shellfish  Supplement Feeding Modality:  Oral  Ensure Enlive Cans or Servings Per Day:  1       Frequency:  Daily  Entered: 2022  8:45AM

## 2022-07-21 NOTE — PROGRESS NOTE ADULT - NUTRITIONAL ASSESSMENT
This patient has been assessed with a concern for Malnutrition and has been determined to have a diagnosis/diagnoses of Moderate protein-calorie malnutrition.    This patient is being managed with:   Diet Renal Restrictions-  For patients receiving Renal Replacement - No Protein Restr No Conc K No Conc Phos Low Sodium  No Shellfish  Supplement Feeding Modality:  Oral  Ensure Enlive Cans or Servings Per Day:  1       Frequency:  Daily  Entered: Jul 21 2022  8:45AM    
This patient has been assessed with a concern for Malnutrition and has been determined to have a diagnosis/diagnoses of Moderate protein-calorie malnutrition.    This patient is being managed with:   Diet Renal Restrictions-  For patients receiving Renal Replacement - No Protein Restr No Conc K No Conc Phos Low Sodium  No Shellfish  Supplement Feeding Modality:  Oral  Ensure Enlive Cans or Servings Per Day:  1       Frequency:  Daily  Entered: Jul 20 2022 10:54AM    Diet Renal Restrictions-  For patients receiving Renal Replacement - No Protein Restr No Conc K No Conc Phos Low Sodium  Entered: Jul 15 2022  3:44PM    The following pending diet order is being considered for treatment of Moderate protein-calorie malnutrition:null

## 2022-07-21 NOTE — DISCHARGE NOTE PROVIDER - NSDCMRMEDTOKEN_GEN_ALL_CORE_FT
aspirin 81 mg oral tablet, chewable: 1 tab(s) orally once a day  atorvastatin 80 mg oral tablet: 1 tab(s) orally once a day  carvedilol 25 mg oral tablet: 1 tab(s) orally 2 times a day  cloNIDine 0.2 mg oral tablet: 1 tab(s) orally 2 times a day  cyanocobalamin 1000 mcg oral tablet: 1 tab(s) orally once a day  hydrALAZINE 50 mg oral tablet: 1 tab(s) orally every 8 hours  loperamide 2 mg oral capsule: 1 day(s) orally once a day  NIFEdipine 60 mg oral tablet, extended release: 1 tab(s) orally once a day  pantoprazole 40 mg oral delayed release tablet: 1 tab(s) orally once a day (before a meal)  senna leaf extract oral tablet: 2 tab(s) orally once a day (at bedtime)  Zofran ODT 4 mg oral tablet, disintegratin tab(s) orally 3 times a day   aspirin 81 mg oral tablet, chewable: 1 tab(s) orally once a day  atorvastatin 80 mg oral tablet: 1 tab(s) orally once a day  carvedilol 25 mg oral tablet: 1 tab(s) orally 2 times a day  cloNIDine 0.2 mg oral tablet: 1 tab(s) orally 2 times a day  cloNIDine 0.2 mg oral tablet: 1 tab(s) orally 2 times a day  cyanocobalamin 1000 mcg oral tablet: 1 tab(s) orally once a day  hydrALAZINE 50 mg oral tablet: 1 tab(s) orally every 8 hours  loperamide 2 mg oral capsule: 1 day(s) orally once a day  NIFEdipine 60 mg oral tablet, extended release: 1 tab(s) orally once a day  pantoprazole 40 mg oral delayed release tablet: 1 tab(s) orally once a day (before a meal)  Zofran ODT 4 mg oral tablet, disintegratin tab(s) orally 3 times a day

## 2022-07-21 NOTE — PROGRESS NOTE ADULT - ASSESSMENT
78 y/o F w PMH of HTN, glaucoma, depression, on home oxygen 2L and ESRD (TTS) presents to the ED for sob x3-4 days.  Patient states that she missed her last 2 dialysis sessions due to having diarrhea and vomiting since last week.  Patient states she also has been having a headache and feeling dizzy.  Denies any focal motor deficits or slurred speech. She fell yesterday without LOC or hitting her head.  Denies any chest pain or discomfort, denies any fevers, chills, cough or headache. Per EMS patient baseline of 3L O2 at home.  Upon arrival in ED, she has been put on BiPAP due to hypoxia.  -CXR shows fluid overload, but better than her last CXR per ED reading. Official report pending.  -Significant labs: K5.2, HCO3 21, Cr 13.4, Hb 7.5, BNP 92007.  -EK bpm, no acute ST-T changes.    Acute on chronic respiratory failure w/ hypoxia   secondary to fluid overload; missed HD   s/p ICU course w/ Bipap and IV Lasix   cont w/ volume maintenance with HD   on current baseline oxygen     ESRD on HD on TThS.  continue per renal    Anemia due to ESRD.  s/p 1U pRBC  on 2022   hgb appears live around 7/8  cont to monitor h/h Transfuse prn continue with epogen     HTN and Dyslipidemia.  Continue with Coreg, Clonidine, Hydralazine, Nifedipine, and Atorvastatin.    Dementia:   continue supportive care.     DVT prophylaxis: Heparin sq.    CODE STATUS: DNI/DNR.   Disposition: per my colleagues documentation the patient and daughter have made the decision to discontinue HD on discharge, DTR will like Home with Hospice   f/u w/ Hospice consult  -->pending  2022 i d/w patient and her daughter shree about their decision for home hospice- including their decision to sto duialysis. explained that Death-will likely occur soon . they both  understood . await review of case by hospice-md and acceptance           78 y/o F w PMH of HTN, glaucoma, depression, on home oxygen 2L and ESRD (TTS) presents to the ED for sob x3-4 days.  Patient states that she missed her last 2 dialysis sessions due to having diarrhea and vomiting since last week.  Patient states she also has been having a headache and feeling dizzy.  Denies any focal motor deficits or slurred speech. She fell yesterday without LOC or hitting her head.  Denies any chest pain or discomfort, denies any fevers, chills, cough or headache. Per EMS patient baseline of 3L O2 at home.  Upon arrival in ED, she has been put on BiPAP due to hypoxia.  -CXR shows fluid overload, but better than her last CXR per ED reading. Official report pending.  -Significant labs: K5.2, HCO3 21, Cr 13.4, Hb 7.5, BNP 12089.  -EK bpm, no acute ST-T changes.    Acute on chronic respiratory failure w/ hypoxia   secondary to fluid overload; missed HD   s/p ICU course w/ Bipap and IV Lasix   cont w/ volume maintenance with HD   on current baseline oxygen     ESRD on HD on TThS.  continue per renal    Anemia due to ESRD.  s/p 1U pRBC  on 2022   hgb appears live around 7/8  cont to monitor h/h Transfuse prn continue with epogen     HTN and Dyslipidemia.  Continue with Coreg, Clonidine, Hydralazine, Nifedipine, and Atorvastatin.    Dementia:   continue supportive care.     DVT prophylaxis: Heparin sq.    CODE STATUS: DNI/DNR.   Disposition: per my colleagues documentation the patient and daughter have made the decision to discontinue HD on discharge, DTR will like Home with Hospice   f/u w/ Hospice consult  -->pending  2022, on 2022  i d/w patient and her daughter shree about their decision for home hospice- including their decision to sto duialysis. explained that Death-will likely occur soon . they both  understood . await review of case by hospice-md and acceptance           76 y/o F w PMH of HTN, glaucoma, depression, on home oxygen 2L and ESRD (TTS) presents to the ED for sob x3-4 days.  Patient states that she missed her last 2 dialysis sessions due to having diarrhea and vomiting since last week.  Patient states she also has been having a headache and feeling dizzy.  Denies any focal motor deficits or slurred speech. She fell yesterday without LOC or hitting her head.  Denies any chest pain or discomfort, denies any fevers, chills, cough or headache. Per EMS patient baseline of 3L O2 at home.  Upon arrival in ED, she has been put on BiPAP due to hypoxia.  -CXR shows fluid overload, but better than her last CXR per ED reading. Official report pending.  -Significant labs: K5.2, HCO3 21, Cr 13.4, Hb 7.5, BNP 79386.  -EK bpm, no acute ST-T changes.    Acute on chronic respiratory failure w/ hypoxia   secondary to fluid overload; missed HD   s/p ICU course w/ Bipap and IV Lasix   cont w/ volume maintenance with HD   on current baseline oxygen     ESRD on HD on TThS.  continue per renal    Anemia due to ESRD.  s/p 1U pRBC  on 2022   hgb appears live around 7/8  cont to monitor h/h Transfuse prn continue with epogen     HTN and Dyslipidemia.  Continue with Coreg, Clonidine, Hydralazine, Nifedipine, and Atorvastatin.   will increase Nifedipine to 90mg    Dementia:   continue supportive care.     DVT prophylaxis: Heparin sq.    CODE STATUS: DNI/DNR.   Disposition: per my colleagues documentation the patient and daughter have made the decision to discontinue HD on discharge, DTR will like Home with Hospice   f/u w/ Hospice consult  -->pending  2022, on 2022  i d/w patient and her daughter shree about their decision for home hospice- including their decision to sto duialysis. explained that Death-will likely occur soon . they both  understood . await review of case by hospice-md and acceptance

## 2022-07-22 ENCOUNTER — TRANSCRIPTION ENCOUNTER (OUTPATIENT)
Age: 77
End: 2022-07-22

## 2022-07-22 VITALS
HEART RATE: 77 BPM | OXYGEN SATURATION: 97 % | TEMPERATURE: 98 F | SYSTOLIC BLOOD PRESSURE: 146 MMHG | DIASTOLIC BLOOD PRESSURE: 73 MMHG | RESPIRATION RATE: 18 BRPM

## 2022-07-22 PROCEDURE — 99239 HOSP IP/OBS DSCHRG MGMT >30: CPT

## 2022-07-22 RX ADMIN — Medication 81 MILLIGRAM(S): at 11:33

## 2022-07-22 RX ADMIN — PREGABALIN 1000 MICROGRAM(S): 225 CAPSULE ORAL at 11:33

## 2022-07-22 RX ADMIN — CHLORHEXIDINE GLUCONATE 1 APPLICATION(S): 213 SOLUTION TOPICAL at 06:03

## 2022-07-22 RX ADMIN — Medication 0.2 MILLIGRAM(S): at 06:02

## 2022-07-22 RX ADMIN — Medication 50 MILLIGRAM(S): at 06:00

## 2022-07-22 RX ADMIN — CARVEDILOL PHOSPHATE 25 MILLIGRAM(S): 80 CAPSULE, EXTENDED RELEASE ORAL at 06:30

## 2022-07-22 RX ADMIN — Medication 90 MILLIGRAM(S): at 06:29

## 2022-07-22 NOTE — DISCHARGE NOTE NURSING/CASE MANAGEMENT/SOCIAL WORK - PATIENT PORTAL LINK FT
You can access the FollowMyHealth Patient Portal offered by [facility] by registering at the following website: [email or web address]. By joining [facility]’s FollowMyHealth portal, you will also be able to view your health information using other applications (apps) compatible with our system.

## 2022-07-22 NOTE — DISCHARGE NOTE NURSING/CASE MANAGEMENT/SOCIAL WORK - NSDCPEFALRISK_GEN_ALL_CORE
For information on Fall & Injury Prevention, visit: [email or web address] OR  [email or web address] OR  https://www.cdc.gov/steadi/patient.html

## 2022-07-28 DIAGNOSIS — E78.5 HYPERLIPIDEMIA, UNSPECIFIED: ICD-10-CM

## 2022-07-28 DIAGNOSIS — I67.4 HYPERTENSIVE ENCEPHALOPATHY: ICD-10-CM

## 2022-07-28 DIAGNOSIS — J45.909 UNSPECIFIED ASTHMA, UNCOMPLICATED: ICD-10-CM

## 2022-07-28 DIAGNOSIS — R06.02 SHORTNESS OF BREATH: ICD-10-CM

## 2022-07-28 DIAGNOSIS — Z66 DO NOT RESUSCITATE: ICD-10-CM

## 2022-07-28 DIAGNOSIS — H40.9 UNSPECIFIED GLAUCOMA: ICD-10-CM

## 2022-07-28 DIAGNOSIS — I16.0 HYPERTENSIVE URGENCY: ICD-10-CM

## 2022-07-28 DIAGNOSIS — Z91.81 HISTORY OF FALLING: ICD-10-CM

## 2022-07-28 DIAGNOSIS — R45.1 RESTLESSNESS AND AGITATION: ICD-10-CM

## 2022-07-28 DIAGNOSIS — Z99.81 DEPENDENCE ON SUPPLEMENTAL OXYGEN: ICD-10-CM

## 2022-07-28 DIAGNOSIS — Z79.82 LONG TERM (CURRENT) USE OF ASPIRIN: ICD-10-CM

## 2022-07-28 DIAGNOSIS — Z91.013 ALLERGY TO SEAFOOD: ICD-10-CM

## 2022-07-28 DIAGNOSIS — E87.70 FLUID OVERLOAD, UNSPECIFIED: ICD-10-CM

## 2022-07-28 DIAGNOSIS — Z99.2 DEPENDENCE ON RENAL DIALYSIS: ICD-10-CM

## 2022-07-28 DIAGNOSIS — F32.A DEPRESSION, UNSPECIFIED: ICD-10-CM

## 2022-07-28 DIAGNOSIS — I50.9 HEART FAILURE, UNSPECIFIED: ICD-10-CM

## 2022-07-28 DIAGNOSIS — I13.2 HYPERTENSIVE HEART AND CHRONIC KIDNEY DISEASE WITH HEART FAILURE AND WITH STAGE 5 CHRONIC KIDNEY DISEASE, OR END STAGE RENAL DISEASE: ICD-10-CM

## 2022-07-28 DIAGNOSIS — Z96.652 PRESENCE OF LEFT ARTIFICIAL KNEE JOINT: ICD-10-CM

## 2022-07-28 DIAGNOSIS — Z91.19 PATIENT'S NONCOMPLIANCE WITH OTHER MEDICAL TREATMENT AND REGIMEN: ICD-10-CM

## 2022-07-28 DIAGNOSIS — R77.8 OTHER SPECIFIED ABNORMALITIES OF PLASMA PROTEINS: ICD-10-CM

## 2022-07-28 DIAGNOSIS — E44.0 MODERATE PROTEIN-CALORIE MALNUTRITION: ICD-10-CM

## 2022-07-28 DIAGNOSIS — Z90.49 ACQUIRED ABSENCE OF OTHER SPECIFIED PARTS OF DIGESTIVE TRACT: ICD-10-CM

## 2022-07-28 DIAGNOSIS — D63.1 ANEMIA IN CHRONIC KIDNEY DISEASE: ICD-10-CM

## 2022-07-28 DIAGNOSIS — J96.21 ACUTE AND CHRONIC RESPIRATORY FAILURE WITH HYPOXIA: ICD-10-CM

## 2022-07-28 DIAGNOSIS — E11.22 TYPE 2 DIABETES MELLITUS WITH DIABETIC CHRONIC KIDNEY DISEASE: ICD-10-CM

## 2022-07-28 DIAGNOSIS — N18.6 END STAGE RENAL DISEASE: ICD-10-CM

## 2022-07-28 DIAGNOSIS — F03.90 UNSPECIFIED DEMENTIA WITHOUT BEHAVIORAL DISTURBANCE: ICD-10-CM

## 2022-08-03 ENCOUNTER — INPATIENT (INPATIENT)
Facility: HOSPITAL | Age: 77
LOS: 4 days | Discharge: HOME | End: 2022-08-08
Attending: GENERAL ACUTE CARE HOSPITAL | Admitting: GENERAL ACUTE CARE HOSPITAL
Payer: MEDICARE

## 2022-08-03 VITALS
DIASTOLIC BLOOD PRESSURE: 125 MMHG | RESPIRATION RATE: 17 BRPM | HEIGHT: 61 IN | OXYGEN SATURATION: 95 % | SYSTOLIC BLOOD PRESSURE: 235 MMHG | WEIGHT: 190.04 LBS | TEMPERATURE: 98 F | HEART RATE: 87 BPM

## 2022-08-03 DIAGNOSIS — I16.1 HYPERTENSIVE EMERGENCY: ICD-10-CM

## 2022-08-03 DIAGNOSIS — J96.21 ACUTE AND CHRONIC RESPIRATORY FAILURE WITH HYPOXIA: ICD-10-CM

## 2022-08-03 DIAGNOSIS — N18.6 END STAGE RENAL DISEASE: ICD-10-CM

## 2022-08-03 DIAGNOSIS — E78.5 HYPERLIPIDEMIA, UNSPECIFIED: ICD-10-CM

## 2022-08-03 DIAGNOSIS — Z96.652 PRESENCE OF LEFT ARTIFICIAL KNEE JOINT: Chronic | ICD-10-CM

## 2022-08-03 DIAGNOSIS — Z90.49 ACQUIRED ABSENCE OF OTHER SPECIFIED PARTS OF DIGESTIVE TRACT: Chronic | ICD-10-CM

## 2022-08-03 LAB
ALBUMIN SERPL ELPH-MCNC: 2.8 G/DL — LOW (ref 3.3–5)
ALP SERPL-CCNC: 72 U/L — SIGNIFICANT CHANGE UP (ref 40–120)
ALT FLD-CCNC: 22 U/L — SIGNIFICANT CHANGE UP (ref 12–78)
ANION GAP SERPL CALC-SCNC: 14 MMOL/L — SIGNIFICANT CHANGE UP (ref 5–17)
AST SERPL-CCNC: 26 U/L — SIGNIFICANT CHANGE UP (ref 15–37)
BASOPHILS # BLD AUTO: 0.09 K/UL — SIGNIFICANT CHANGE UP (ref 0–0.2)
BASOPHILS NFR BLD AUTO: 0.7 % — SIGNIFICANT CHANGE UP (ref 0–2)
BILIRUB SERPL-MCNC: 0.6 MG/DL — SIGNIFICANT CHANGE UP (ref 0.2–1.2)
BUN SERPL-MCNC: 61 MG/DL — HIGH (ref 7–23)
CALCIUM SERPL-MCNC: 9.2 MG/DL — SIGNIFICANT CHANGE UP (ref 8.5–10.1)
CHLORIDE SERPL-SCNC: 97 MMOL/L — SIGNIFICANT CHANGE UP (ref 96–108)
CO2 SERPL-SCNC: 22 MMOL/L — SIGNIFICANT CHANGE UP (ref 22–31)
CREAT SERPL-MCNC: 10.6 MG/DL — HIGH (ref 0.5–1.3)
EGFR: 3 ML/MIN/1.73M2 — LOW
EOSINOPHIL # BLD AUTO: 0.2 K/UL — SIGNIFICANT CHANGE UP (ref 0–0.5)
EOSINOPHIL NFR BLD AUTO: 1.5 % — SIGNIFICANT CHANGE UP (ref 0–6)
GLUCOSE SERPL-MCNC: 132 MG/DL — HIGH (ref 70–99)
HCT VFR BLD CALC: 25 % — LOW (ref 34.5–45)
HGB BLD-MCNC: 8.4 G/DL — LOW (ref 11.5–15.5)
IMM GRANULOCYTES NFR BLD AUTO: 1 % — SIGNIFICANT CHANGE UP (ref 0–1.5)
LIDOCAIN IGE QN: 119 U/L — SIGNIFICANT CHANGE UP (ref 73–393)
LYMPHOCYTES # BLD AUTO: 1.06 K/UL — SIGNIFICANT CHANGE UP (ref 1–3.3)
LYMPHOCYTES # BLD AUTO: 7.8 % — LOW (ref 13–44)
MAGNESIUM SERPL-MCNC: 2.2 MG/DL — SIGNIFICANT CHANGE UP (ref 1.6–2.6)
MCHC RBC-ENTMCNC: 28.3 PG — SIGNIFICANT CHANGE UP (ref 27–34)
MCHC RBC-ENTMCNC: 33.6 G/DL — SIGNIFICANT CHANGE UP (ref 32–36)
MCV RBC AUTO: 84.2 FL — SIGNIFICANT CHANGE UP (ref 80–100)
MONOCYTES # BLD AUTO: 1.08 K/UL — HIGH (ref 0–0.9)
MONOCYTES NFR BLD AUTO: 8 % — SIGNIFICANT CHANGE UP (ref 2–14)
NEUTROPHILS # BLD AUTO: 10.95 K/UL — HIGH (ref 1.8–7.4)
NEUTROPHILS NFR BLD AUTO: 81 % — HIGH (ref 43–77)
NRBC # BLD: 0 /100 WBCS — SIGNIFICANT CHANGE UP (ref 0–0)
NT-PROBNP SERPL-SCNC: HIGH PG/ML (ref 0–450)
PLATELET # BLD AUTO: 239 K/UL — SIGNIFICANT CHANGE UP (ref 150–400)
POTASSIUM SERPL-MCNC: 4.8 MMOL/L — SIGNIFICANT CHANGE UP (ref 3.5–5.3)
POTASSIUM SERPL-SCNC: 4.8 MMOL/L — SIGNIFICANT CHANGE UP (ref 3.5–5.3)
PROT SERPL-MCNC: 8.2 GM/DL — SIGNIFICANT CHANGE UP (ref 6–8.3)
RAPID RVP RESULT: SIGNIFICANT CHANGE UP
RBC # BLD: 2.97 M/UL — LOW (ref 3.8–5.2)
RBC # FLD: 14.4 % — SIGNIFICANT CHANGE UP (ref 10.3–14.5)
SARS-COV-2 RNA SPEC QL NAA+PROBE: SIGNIFICANT CHANGE UP
SODIUM SERPL-SCNC: 133 MMOL/L — LOW (ref 135–145)
TROPONIN I, HIGH SENSITIVITY RESULT: 31.5 NG/L — SIGNIFICANT CHANGE UP
WBC # BLD: 13.52 K/UL — HIGH (ref 3.8–10.5)
WBC # FLD AUTO: 13.52 K/UL — HIGH (ref 3.8–10.5)

## 2022-08-03 PROCEDURE — 99291 CRITICAL CARE FIRST HOUR: CPT | Mod: 25

## 2022-08-03 PROCEDURE — 93306 TTE W/DOPPLER COMPLETE: CPT | Mod: 26

## 2022-08-03 PROCEDURE — 99223 1ST HOSP IP/OBS HIGH 75: CPT

## 2022-08-03 PROCEDURE — 71045 X-RAY EXAM CHEST 1 VIEW: CPT | Mod: 26

## 2022-08-03 RX ORDER — HYDRALAZINE HCL 50 MG
50 TABLET ORAL EVERY 8 HOURS
Refills: 0 | Status: DISCONTINUED | OUTPATIENT
Start: 2022-08-03 | End: 2022-08-08

## 2022-08-03 RX ORDER — ASPIRIN/CALCIUM CARB/MAGNESIUM 324 MG
324 TABLET ORAL ONCE
Refills: 0 | Status: COMPLETED | OUTPATIENT
Start: 2022-08-03 | End: 2022-08-03

## 2022-08-03 RX ORDER — ACETAMINOPHEN 500 MG
650 TABLET ORAL EVERY 6 HOURS
Refills: 0 | Status: DISCONTINUED | OUTPATIENT
Start: 2022-08-03 | End: 2022-08-08

## 2022-08-03 RX ORDER — VANCOMYCIN HCL 1 G
1000 VIAL (EA) INTRAVENOUS ONCE
Refills: 0 | Status: COMPLETED | OUTPATIENT
Start: 2022-08-03 | End: 2022-08-03

## 2022-08-03 RX ORDER — ATORVASTATIN CALCIUM 80 MG/1
80 TABLET, FILM COATED ORAL AT BEDTIME
Refills: 0 | Status: DISCONTINUED | OUTPATIENT
Start: 2022-08-03 | End: 2022-08-08

## 2022-08-03 RX ORDER — HYDRALAZINE HCL 50 MG
10 TABLET ORAL ONCE
Refills: 0 | Status: COMPLETED | OUTPATIENT
Start: 2022-08-03 | End: 2022-08-03

## 2022-08-03 RX ORDER — CARVEDILOL PHOSPHATE 80 MG/1
25 CAPSULE, EXTENDED RELEASE ORAL EVERY 12 HOURS
Refills: 0 | Status: DISCONTINUED | OUTPATIENT
Start: 2022-08-03 | End: 2022-08-08

## 2022-08-03 RX ORDER — HEPARIN SODIUM 5000 [USP'U]/ML
7500 INJECTION INTRAVENOUS; SUBCUTANEOUS EVERY 12 HOURS
Refills: 0 | Status: DISCONTINUED | OUTPATIENT
Start: 2022-08-03 | End: 2022-08-08

## 2022-08-03 RX ORDER — NIFEDIPINE 30 MG
60 TABLET, EXTENDED RELEASE 24 HR ORAL DAILY
Refills: 0 | Status: DISCONTINUED | OUTPATIENT
Start: 2022-08-03 | End: 2022-08-08

## 2022-08-03 RX ORDER — ASPIRIN/CALCIUM CARB/MAGNESIUM 324 MG
81 TABLET ORAL DAILY
Refills: 0 | Status: DISCONTINUED | OUTPATIENT
Start: 2022-08-03 | End: 2022-08-08

## 2022-08-03 RX ORDER — PIPERACILLIN AND TAZOBACTAM 4; .5 G/20ML; G/20ML
3.38 INJECTION, POWDER, LYOPHILIZED, FOR SOLUTION INTRAVENOUS EVERY 12 HOURS
Refills: 0 | Status: DISCONTINUED | OUTPATIENT
Start: 2022-08-03 | End: 2022-08-07

## 2022-08-03 RX ORDER — PREGABALIN 225 MG/1
1000 CAPSULE ORAL DAILY
Refills: 0 | Status: DISCONTINUED | OUTPATIENT
Start: 2022-08-03 | End: 2022-08-08

## 2022-08-03 RX ORDER — PIPERACILLIN AND TAZOBACTAM 4; .5 G/20ML; G/20ML
3.38 INJECTION, POWDER, LYOPHILIZED, FOR SOLUTION INTRAVENOUS ONCE
Refills: 0 | Status: COMPLETED | OUTPATIENT
Start: 2022-08-03 | End: 2022-08-03

## 2022-08-03 RX ORDER — LANOLIN ALCOHOL/MO/W.PET/CERES
3 CREAM (GRAM) TOPICAL AT BEDTIME
Refills: 0 | Status: DISCONTINUED | OUTPATIENT
Start: 2022-08-03 | End: 2022-08-08

## 2022-08-03 RX ORDER — FUROSEMIDE 40 MG
80 TABLET ORAL ONCE
Refills: 0 | Status: COMPLETED | OUTPATIENT
Start: 2022-08-03 | End: 2022-08-03

## 2022-08-03 RX ORDER — PANTOPRAZOLE SODIUM 20 MG/1
40 TABLET, DELAYED RELEASE ORAL
Refills: 0 | Status: DISCONTINUED | OUTPATIENT
Start: 2022-08-03 | End: 2022-08-07

## 2022-08-03 RX ADMIN — Medication 80 MILLIGRAM(S): at 09:54

## 2022-08-03 RX ADMIN — Medication 10 MILLIGRAM(S): at 06:26

## 2022-08-03 RX ADMIN — PREGABALIN 1000 MICROGRAM(S): 225 CAPSULE ORAL at 18:40

## 2022-08-03 RX ADMIN — Medication 324 MILLIGRAM(S): at 06:26

## 2022-08-03 RX ADMIN — Medication 250 MILLIGRAM(S): at 18:38

## 2022-08-03 RX ADMIN — Medication 60 MILLIGRAM(S): at 18:39

## 2022-08-03 RX ADMIN — Medication 81 MILLIGRAM(S): at 18:38

## 2022-08-03 RX ADMIN — ATORVASTATIN CALCIUM 80 MILLIGRAM(S): 80 TABLET, FILM COATED ORAL at 21:13

## 2022-08-03 RX ADMIN — Medication 0.2 MILLIGRAM(S): at 18:40

## 2022-08-03 RX ADMIN — HEPARIN SODIUM 7500 UNIT(S): 5000 INJECTION INTRAVENOUS; SUBCUTANEOUS at 18:39

## 2022-08-03 RX ADMIN — CARVEDILOL PHOSPHATE 25 MILLIGRAM(S): 80 CAPSULE, EXTENDED RELEASE ORAL at 18:39

## 2022-08-03 RX ADMIN — PIPERACILLIN AND TAZOBACTAM 200 GRAM(S): 4; .5 INJECTION, POWDER, LYOPHILIZED, FOR SOLUTION INTRAVENOUS at 18:38

## 2022-08-03 RX ADMIN — Medication 50 MILLIGRAM(S): at 18:40

## 2022-08-03 NOTE — PATIENT PROFILE ADULT - FALL HARM RISK - HARM RISK INTERVENTIONS

## 2022-08-03 NOTE — H&P ADULT - HISTORY OF PRESENT ILLNESS
77 years old female with h/o HTN, ESRD on dialysis, depression present to ED with complain of SOB. Last dialysis was on Friday. Missed dialysis on Monday due to nausea, vomiting and not feeling well. Reported progressively worsening SOB and orthopnea. No fever, chills.   Hypertensive to 235/125, tachypneic and required BiPAP in ED. WBC 13.52, Plt 239, K 4.8, hsTnT 31.5, lipase 119, proBNP 74117. CXR image reviewed, significant bilateral congestion/infiltrate.     SH: no toxic habits  FH: no family h/o HTN, DM

## 2022-08-03 NOTE — ED PROVIDER NOTE - PHYSICAL EXAMINATION
Vitals: HTN at 235/125, hypoxic without bipap  Gen: AAOx3, in respiratory distress, on cpap placed by EMS, sitting up uncomfortably in stretcher  Head: ncat, perrla, eomi b/l  Neck: supple, no lymphadenopathy, no midline deviation  Heart: rrr, no m/r/g  Lungs: diffuse rales and rhonchi   Abd: soft, nontender, non-distended, no rebound or guarding  Ext: no clubbing/cyanosis/edema  Neuro: sensation and muscle strength intact b/l, no focal weakness/sensory loss

## 2022-08-03 NOTE — ED ADULT NURSE REASSESSMENT NOTE - NS ED NURSE REASSESS COMMENT FT1
1100- Pt left ED for dialysis in no s/s acute distress, pt continues on Bipa, pt was accompanied by RN & RT via quang. Report was given to HD Nurse Bonnie.
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
## 2022-08-03 NOTE — PATIENT PROFILE ADULT - FALL HARM RISK - PATIENT NEEDS ASSISTANCE
Routing refill request to provider for review/approval because:  Labs not current:  creatinine         Standing/Walking

## 2022-08-03 NOTE — ED PROVIDER NOTE - PROGRESS NOTE DETAILS
Patient signed out to me, BIPAP weaned down to 40% FIO2, renal aware of patient, will admit to Dr. Riley, requesting 80mg IVP lasix.

## 2022-08-03 NOTE — ED ADULT NURSE NOTE - NSIMPLEMENTINTERV_GEN_ALL_ED
Implemented All Universal Safety Interventions:  [place] to call system. Call bell, personal items and telephone within reach. Instruct patient to call for assistance. Room bathroom lighting operational. Non-slip footwear when patient is off stretcher. Physically safe environment: no spills, clutter or unnecessary equipment. Stretcher in lowest position, wheels locked, appropriate side rails in place.

## 2022-08-03 NOTE — CONSULT NOTE ADULT - SUBJECTIVE AND OBJECTIVE BOX
[facility] NEPHROLOGY SERVICES, [facility]  NEPHROLOGY AND HYPERTENSION  [address]  [address]  [place] [address]  [phone]    MD MATTHEW PATIÑO MD ANDREY GONCHARUK, MD MADHU KORRAPATI, MD YELENA ROSENBERG, MD BINNY KOSHY, MD CHRISTOPHER CAPUTO, MD EDWARD BOVER, MD      Information from chart:  "Patient is a 77y old  Female who presents with a chief complaint of increasing sob    HPI: ESRD, last treatment ; developed n/v and diarrhea over weekend; intermittently Monday and Tuesday;   Missed treatment Monday; developed increasing SOB; no fever or cough ; CXR noted with infiltrates; elevated WBC   Also noted 's/130's    Of note patient with persistent infiltrates since 2022 CT scan     PAST MEDICAL & SURGICAL HISTORY:  HTN (hypertension)      ESRD on dialysis  , Thurs, Sat      Asthma      DM (diabetes mellitus)      Dyslipidemia      Noncompliance with treatment      Severe anemia      On home oxygen therapy      History of cholecystectomy      History of left knee replacement        FAMILY HISTORY:    Allergies    No Known Drug Allergies  shellfish (Swelling)    Intolerances      Home Medications:  aspirin 81 mg oral tablet, chewable: 1 tab(s) orally once a day (2022 16:36)  atorvastatin 80 mg oral tablet: 1 tab(s) orally once a day (2022 16:36)  carvedilol 25 mg oral tablet: 1 tab(s) orally 2 times a day (2022 16:36)  cloNIDine 0.2 mg oral tablet: 1 tab(s) orally 2 times a day (2022 14:15)  loperamide 2 mg oral capsule: 1 day(s) orally once a day (2022 18:14)  NIFEdipine 60 mg oral tablet, extended release: 1 tab(s) orally once a day (2022 18:26)  Zofran ODT 4 mg oral tablet, disintegratin tab(s) orally 3 times a day (2022 18:26)    MEDICATIONS  (STANDING):  aspirin  chewable 81 milliGRAM(s) Oral daily  atorvastatin 80 milliGRAM(s) Oral at bedtime  carvedilol 25 milliGRAM(s) Oral every 12 hours  cloNIDine 0.2 milliGRAM(s) Oral two times a day  cyanocobalamin 1000 MICROGram(s) Oral daily  heparin   Injectable 7500 Unit(s) SubCutaneous every 12 hours  hydrALAZINE 50 milliGRAM(s) Oral every 8 hours  NIFEdipine XL 60 milliGRAM(s) Oral daily  pantoprazole    Tablet 40 milliGRAM(s) Oral before breakfast    MEDICATIONS  (PRN):  acetaminophen     Tablet .. 650 milliGRAM(s) Oral every 6 hours PRN Mild Pain (1 - 3), Moderate Pain (4 - 6)  melatonin 3 milliGRAM(s) Oral at bedtime PRN Insomnia    Vital Signs Last 24 Hrs  T(C): 36.6 (03 Aug 2022 05:14), Max: 36.6 (03 Aug 2022 05:14)  T(F): 97.8 (03 Aug 2022 05:14), Max: 97.8 (03 Aug 2022 05:14)  HR: 70 (03 Aug 2022 08:26) (60 - 87)  BP: 179/83 (03 Aug 2022 07:30) (179/83 - 235/125)  BP(mean): --  RR: 18 (03 Aug 2022 07:30) (17 - 24)  SpO2: 100% (03 Aug 2022 08:26) (95% - 100%)    Parameters below as of 03 Aug 2022 07:30  Patient On (Oxygen Delivery Method): BiPAP/CPAP        Daily Height in cm: 154.94 (03 Aug 2022 05:14)    Daily     CAPILLARY BLOOD GLUCOSE        PHYSICAL EXAM:      T(C): 36.6 (22 @ 05:14), Max: 36.6 (22 @ 05:14)  HR: 70 (22 @ 08:26) (60 - 87)  BP: 179/83 (22 @ 07:30) (179/83 - 235/125)  RR: 18 (22 @ 07:30) (17 - 24)  SpO2: 100% (22 @ 08:26) (95% - 100%)  Wt(kg): --  Lungs clear ant decreased BS at bases  Heart S1S2  Abd soft NT ND  Extremities:   tr edema                  133<L>  |  97  |  61<H>  ----------------------------<  132<H>  4.8   |  22  |  10.60<H>    Ca    9.2      03 Aug 2022 05:27  Mg     2.2         TPro  8.2  /  Alb  2.8<L>  /  TBili  0.6  /  DBili  x   /  AST  26  /  ALT  22  /  AlkPhos  72                            8.4    13.52 )-----------( 239      ( 03 Aug 2022 05:27 )             25.0     Creatinine Trend: 10.60<--, 6.49<--, 8.67<--, 5.30<--, 7.75<--, 14.00<--          Assessment   ESRD; nausea, vomiting and diarrhea; leukocytosis   Persistent pulm infiltrates; r/o aspiration, infectious, fluid   Fluid overload HTN urgency     Plan  Emergent HD UF 2.5 kg  Follow echo;   Basic serologies r/o underlying inflammatory changes;   HD for tomorrow and Friday;     Harry Osborn MD [facility] NEPHROLOGY SERVICES, [facility]  NEPHROLOGY AND HYPERTENSION  [address]  [address]  [place] [address]  [phone]    MD MATTHEW PATIÑO MD ANDREY GONCHARUK, MD MADHU KORRAPATI, MD YELENA ROSENBERG, MD BINNY KOSHY, MD CHRISTOPHER CAPUTO, MD EDWARD BOVER, MD      Information from chart:  "Patient is a 77y old  Female who presents with a chief complaint of increasing sob    HPI: ESRD, last treatment ; developed n/v and diarrhea over weekend; intermittently Monday and Tuesday;   Missed treatment Monday; developed increasing SOB; no fever or cough ; CXR noted with infiltrates; elevated WBC   Also noted 's/130's    Of note patient with persistent infiltrates since 2022 CT scan     PAST MEDICAL & SURGICAL HISTORY:  HTN (hypertension)      ESRD on dialysis  , Thurs, Sat      Asthma      DM (diabetes mellitus)      Dyslipidemia      Noncompliance with treatment      Severe anemia      On home oxygen therapy      History of cholecystectomy      History of left knee replacement        FAMILY HISTORY:    Allergies    No Known Drug Allergies  shellfish (Swelling)    Intolerances      Home Medications:  aspirin 81 mg oral tablet, chewable: 1 tab(s) orally once a day (2022 16:36)  atorvastatin 80 mg oral tablet: 1 tab(s) orally once a day (2022 16:36)  carvedilol 25 mg oral tablet: 1 tab(s) orally 2 times a day (2022 16:36)  cloNIDine 0.2 mg oral tablet: 1 tab(s) orally 2 times a day (2022 14:15)  loperamide 2 mg oral capsule: 1 day(s) orally once a day (2022 18:14)  NIFEdipine 60 mg oral tablet, extended release: 1 tab(s) orally once a day (2022 18:26)  Zofran ODT 4 mg oral tablet, disintegratin tab(s) orally 3 times a day (2022 18:26)    MEDICATIONS  (STANDING):  aspirin  chewable 81 milliGRAM(s) Oral daily  atorvastatin 80 milliGRAM(s) Oral at bedtime  carvedilol 25 milliGRAM(s) Oral every 12 hours  cloNIDine 0.2 milliGRAM(s) Oral two times a day  cyanocobalamin 1000 MICROGram(s) Oral daily  heparin   Injectable 7500 Unit(s) SubCutaneous every 12 hours  hydrALAZINE 50 milliGRAM(s) Oral every 8 hours  NIFEdipine XL 60 milliGRAM(s) Oral daily  pantoprazole    Tablet 40 milliGRAM(s) Oral before breakfast    MEDICATIONS  (PRN):  acetaminophen     Tablet .. 650 milliGRAM(s) Oral every 6 hours PRN Mild Pain (1 - 3), Moderate Pain (4 - 6)  melatonin 3 milliGRAM(s) Oral at bedtime PRN Insomnia    Vital Signs Last 24 Hrs  T(C): 36.6 (03 Aug 2022 05:14), Max: 36.6 (03 Aug 2022 05:14)  T(F): 97.8 (03 Aug 2022 05:14), Max: 97.8 (03 Aug 2022 05:14)  HR: 70 (03 Aug 2022 08:26) (60 - 87)  BP: 179/83 (03 Aug 2022 07:30) (179/83 - 235/125)  BP(mean): --  RR: 18 (03 Aug 2022 07:30) (17 - 24)  SpO2: 100% (03 Aug 2022 08:26) (95% - 100%)    Parameters below as of 03 Aug 2022 07:30  Patient On (Oxygen Delivery Method): BiPAP/CPAP        Daily Height in cm: 154.94 (03 Aug 2022 05:14)    Daily     CAPILLARY BLOOD GLUCOSE        PHYSICAL EXAM:      T(C): 36.6 (22 @ 05:14), Max: 36.6 (22 @ 05:14)  HR: 70 (22 @ 08:26) (60 - 87)  BP: 179/83 (22 @ 07:30) (179/83 - 235/125)  RR: 18 (22 @ 07:30) (17 - 24)  SpO2: 100% (22 @ 08:26) (95% - 100%)  Wt(kg): --  Lungs clear ant decreased BS at bases  Heart S1S2  Abd soft NT ND  Extremities:   tr edema                  133<L>  |  97  |  61<H>  ----------------------------<  132<H>  4.8   |  22  |  10.60<H>    Ca    9.2      03 Aug 2022 05:27  Mg     2.2         TPro  8.2  /  Alb  2.8<L>  /  TBili  0.6  /  DBili  x   /  AST  26  /  ALT  22  /  AlkPhos  72                            8.4    13.52 )-----------( 239      ( 03 Aug 2022 05:27 )             25.0     Creatinine Trend: 10.60<--, 6.49<--, 8.67<--, 5.30<--, 7.75<--, 14.00<--          Assessment   ESRD; nausea, vomiting and diarrhea; leukocytosis   Persistent pulm infiltrates; r/o aspiration, infectious, fluid   Fluid overload HTN urgency     Plan  Emergent HD UF 2.5 kg  Follow echo;   Resume BP medications post HD  Empiric Vanco; zosyn  Basic serologies r/o underlying inflammatory changes;   HD for tomorrow and Friday;     Harry Osborn MD

## 2022-08-03 NOTE — H&P ADULT - NSHPADDITIONALINFOADULT_GEN_ALL_CORE
Confirmed with patient's daughter Idalia Hill ( [phone]) over the phone. DNR/DNI. Previous admission documents reviewed. Scanned documents also reviewed ( Patient window--> admit--> MOLST form signed on 04/29/2022)
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
## 2022-08-03 NOTE — ED ADULT NURSE NOTE - OBJECTIVE STATEMENT
pt received to bed 8 c/o shortness of breath. as per pt's granddaughter at bedside, pt missed dialysis on Monday. pt usually has dialysis M-W-F. pt has AVF in left arm. as per EMS, pt received 5 nitro sprays and was placed on BIPAP. pt found to be hypertensive in ED. c/o headache. denies: chest pain, shortness of breath. pt states she is visiting from [place]. on cardiac monitor at bedside

## 2022-08-03 NOTE — H&P ADULT - PROBLEM SELECTOR PLAN 2
Hypertensive to 235/125, pulmonary edema, respiratory failure needing BiPAP  Resume home meds  Nephrology arranging dialysis  Monitor BP and titrate BP meds

## 2022-08-03 NOTE — H&P ADULT - PROBLEM SELECTOR PLAN 1
due to pulmonary edema, missed HD section  Nephrology consulted, arranging dialysis  Continue BiPAP as needed  Monitor respiratory status due to pulmonary edema, missed HD section  Nephrology consulted, arranging dialysis  Continue BiPAP as needed  Monitor respiratory status  On empiric antibiotics  Follow up blood culture  Check procalcitonin

## 2022-08-03 NOTE — ED ADULT NURSE NOTE - ED STAT RN HANDOFF DETAILS
Report endorsed to oncoming RN. Safety checks completed this shift. Safety rounds completed hourly.  IV sites remains WDL. Medications administered as ordered with no signs/symptoms of adverse reactions. Fall & skin precautions in place. Any issues endorsed to oncoming RN for follow up. on cardiac monitor at bedside. on BIPAP. family at bedside. awaiting urine

## 2022-08-03 NOTE — H&P ADULT - ASSESSMENT
77 years old female with h/o HTN, ESRD on dialysis, depression present to ED with complain of SOB. Last dialysis was on Friday. Missed dialysis on Monday due to nausea, vomiting and not feeling well. Reported progressively worsening SOB and orthopnea. No fever, chills.   Hypertensive to 235/125, tachypenic and required BiPAP in ED. WBC 13.52, Plt 239, K 4.8, hsTnT 31.5, lipase 119, proBNP 48146. CXR image reviewed, significant bilateral congestion/infiltrate.

## 2022-08-03 NOTE — ED PROVIDER NOTE - OBJECTIVE STATEMENT
76 yo F with acute sob, htn, missed dialysis (5 days since last dialysis--MWF schedule).  Pt. given nitro by EMS, placed on cpap with improvement.   ROS: negative for fever, headache, chest pain, abd pain, nausea, vomiting, diarrhea, rash, paresthesia, and focal weakness--all other systems reviewed are negative.   PMH: HTN, DM, asthma, severe anemia, glaucoma, depression, on home oxygen 2L and ESRD, (recurrent non-compliant with medical therapy, per chart review); Meds: See EMR for list; SH: Denies smoking/drinking/drug use 78 yo F with acute sob, htn, missed dialysis (5 days since last dialysis)--pt. was nauseas and didn't feel up to going.  Pt. given nitro by EMS, placed on cpap with improvement.   ROS: negative for fever, headache, chest pain, abd pain, nausea, vomiting, diarrhea, rash, paresthesia, and focal weakness--all other systems reviewed are negative.   PMH: HTN, DM, asthma, severe anemia, glaucoma, depression, on home oxygen 2L and ESRD, (recurrent non-compliant with medical therapy, per chart review); Meds: See EMR for list; SH: Denies smoking/drinking/drug use

## 2022-08-03 NOTE — H&P ADULT - NSHPPHYSICALEXAM_GEN_ALL_CORE
CONSTITUTIONAL: Well developed, well nourished, alert and cooperative, moderate respiratory distress  EYES: PERRL, no scleral icterus  ENT: Mucosa moist, tongue normal.  NECK: Neck supple, trachea midline, non-tender.  CARDIAC: Normal S1 and S2. Regular rate and rhythms. No murmurs. No Pedal edema. Peripheral pulses intact  LUNGS: Equal air entry both lungs. Bilateral rales/crackles. Increase respiratory effort.   ABDOMEN: Soft, nondistended, nontender. No guarding or rebound tenderness. No hepatomegaly or splenomegaly. Bowel sound normal.  MUSCULOSKELETAL: Normocephalic, atraumatic. No significant deformity or joint abnormality  NEUROLOGICAL: No gross motor or sensory deficits.  SKIN: no lesions or eruptions. Normal turgor  PSYCHIATRIC: A&O x 3, appropriate mood and affect

## 2022-08-03 NOTE — ED PROVIDER NOTE - CLINICAL SUMMARY MEDICAL DECISION MAKING FREE TEXT BOX
78 yo F with acute sob, likely 2/2 missed dialysis, fluid overload, also concerning for acs, pna, viral infection  -basic labs, abg, lipase, mag, rvp, bnp, trop, CXR, ekg, iv, asa, hydralazine for pressure, asa, monitor, bipap  -f/u results, reeval, admit

## 2022-08-03 NOTE — ED ADULT NURSE NOTE - SEPSIS REFERENCE DATA CRITERIA 1
Orientation to room/Bed in low position, brakes on Abormal VS: Temp > 100F or < 96.8F; SBP < 90 mmHG; HR > 120bpm; Resp > 24/min

## 2022-08-04 LAB
ACE SERPL-CCNC: 49 U/L — SIGNIFICANT CHANGE UP (ref 14–82)
AUTO DIFF PNL BLD: NEGATIVE — SIGNIFICANT CHANGE UP
C-ANCA SER-ACNC: NEGATIVE — SIGNIFICANT CHANGE UP
P-ANCA SER-ACNC: NEGATIVE — SIGNIFICANT CHANGE UP

## 2022-08-04 PROCEDURE — 99233 SBSQ HOSP IP/OBS HIGH 50: CPT

## 2022-08-04 RX ORDER — LOPERAMIDE HCL 2 MG
2 TABLET ORAL ONCE
Refills: 0 | Status: COMPLETED | OUTPATIENT
Start: 2022-08-04 | End: 2022-08-04

## 2022-08-04 RX ADMIN — CARVEDILOL PHOSPHATE 25 MILLIGRAM(S): 80 CAPSULE, EXTENDED RELEASE ORAL at 05:39

## 2022-08-04 RX ADMIN — Medication 2 MILLIGRAM(S): at 14:43

## 2022-08-04 RX ADMIN — Medication 60 MILLIGRAM(S): at 05:38

## 2022-08-04 RX ADMIN — PIPERACILLIN AND TAZOBACTAM 25 GRAM(S): 4; .5 INJECTION, POWDER, LYOPHILIZED, FOR SOLUTION INTRAVENOUS at 01:45

## 2022-08-04 RX ADMIN — Medication 50 MILLIGRAM(S): at 14:42

## 2022-08-04 RX ADMIN — PANTOPRAZOLE SODIUM 40 MILLIGRAM(S): 20 TABLET, DELAYED RELEASE ORAL at 05:39

## 2022-08-04 RX ADMIN — ATORVASTATIN CALCIUM 80 MILLIGRAM(S): 80 TABLET, FILM COATED ORAL at 21:59

## 2022-08-04 RX ADMIN — Medication 0.2 MILLIGRAM(S): at 18:23

## 2022-08-04 RX ADMIN — Medication 81 MILLIGRAM(S): at 14:42

## 2022-08-04 RX ADMIN — Medication 50 MILLIGRAM(S): at 21:59

## 2022-08-04 RX ADMIN — PIPERACILLIN AND TAZOBACTAM 25 GRAM(S): 4; .5 INJECTION, POWDER, LYOPHILIZED, FOR SOLUTION INTRAVENOUS at 18:22

## 2022-08-04 RX ADMIN — CARVEDILOL PHOSPHATE 25 MILLIGRAM(S): 80 CAPSULE, EXTENDED RELEASE ORAL at 18:23

## 2022-08-04 RX ADMIN — Medication 50 MILLIGRAM(S): at 05:40

## 2022-08-04 RX ADMIN — Medication 0.2 MILLIGRAM(S): at 05:38

## 2022-08-04 RX ADMIN — HEPARIN SODIUM 7500 UNIT(S): 5000 INJECTION INTRAVENOUS; SUBCUTANEOUS at 18:22

## 2022-08-04 RX ADMIN — PREGABALIN 1000 MICROGRAM(S): 225 CAPSULE ORAL at 14:42

## 2022-08-05 LAB
ALBUMIN SERPL ELPH-MCNC: 2.3 G/DL — LOW (ref 3.3–5)
ALBUMIN SERPL ELPH-MCNC: 2.4 G/DL — LOW (ref 3.3–5)
ALP SERPL-CCNC: 55 U/L — SIGNIFICANT CHANGE UP (ref 40–120)
ALP SERPL-CCNC: 58 U/L — SIGNIFICANT CHANGE UP (ref 40–120)
ALT FLD-CCNC: 10 U/L — LOW (ref 12–78)
ALT FLD-CCNC: 12 U/L — SIGNIFICANT CHANGE UP (ref 12–78)
ANION GAP SERPL CALC-SCNC: 13 MMOL/L — SIGNIFICANT CHANGE UP (ref 5–17)
ANION GAP SERPL CALC-SCNC: 8 MMOL/L — SIGNIFICANT CHANGE UP (ref 5–17)
AST SERPL-CCNC: 10 U/L — LOW (ref 15–37)
AST SERPL-CCNC: 10 U/L — LOW (ref 15–37)
BILIRUB SERPL-MCNC: 0.3 MG/DL — SIGNIFICANT CHANGE UP (ref 0.2–1.2)
BILIRUB SERPL-MCNC: 0.3 MG/DL — SIGNIFICANT CHANGE UP (ref 0.2–1.2)
BLD GP AB SCN SERPL QL: SIGNIFICANT CHANGE UP
BUN SERPL-MCNC: 27 MG/DL — HIGH (ref 7–23)
BUN SERPL-MCNC: 41 MG/DL — HIGH (ref 7–23)
CALCIUM SERPL-MCNC: 8 MG/DL — LOW (ref 8.5–10.1)
CALCIUM SERPL-MCNC: 8 MG/DL — LOW (ref 8.5–10.1)
CHLORIDE SERPL-SCNC: 100 MMOL/L — SIGNIFICANT CHANGE UP (ref 96–108)
CHLORIDE SERPL-SCNC: 99 MMOL/L — SIGNIFICANT CHANGE UP (ref 96–108)
CO2 SERPL-SCNC: 26 MMOL/L — SIGNIFICANT CHANGE UP (ref 22–31)
CO2 SERPL-SCNC: 31 MMOL/L — SIGNIFICANT CHANGE UP (ref 22–31)
CREAT SERPL-MCNC: 5.2 MG/DL — HIGH (ref 0.5–1.3)
CREAT SERPL-MCNC: 7.88 MG/DL — HIGH (ref 0.5–1.3)
EGFR: 5 ML/MIN/1.73M2 — LOW
EGFR: 8 ML/MIN/1.73M2 — LOW
FERRITIN SERPL-MCNC: 1544 NG/ML — HIGH (ref 15–150)
GLUCOSE SERPL-MCNC: 132 MG/DL — HIGH (ref 70–99)
GLUCOSE SERPL-MCNC: 143 MG/DL — HIGH (ref 70–99)
HCT VFR BLD CALC: 17.8 % — CRITICAL LOW (ref 34.5–45)
HCT VFR BLD CALC: 17.9 % — CRITICAL LOW (ref 34.5–45)
HGB BLD-MCNC: 6 G/DL — CRITICAL LOW (ref 11.5–15.5)
HGB BLD-MCNC: 6.1 G/DL — CRITICAL LOW (ref 11.5–15.5)
IRON SATN MFR SERPL: 44 % — SIGNIFICANT CHANGE UP (ref 14–50)
IRON SATN MFR SERPL: 68 UG/DL — SIGNIFICANT CHANGE UP (ref 30–160)
MAGNESIUM SERPL-MCNC: 2.1 MG/DL — SIGNIFICANT CHANGE UP (ref 1.6–2.6)
MAGNESIUM SERPL-MCNC: 2.1 MG/DL — SIGNIFICANT CHANGE UP (ref 1.6–2.6)
MCHC RBC-ENTMCNC: 28.2 PG — SIGNIFICANT CHANGE UP (ref 27–34)
MCHC RBC-ENTMCNC: 28.6 PG — SIGNIFICANT CHANGE UP (ref 27–34)
MCHC RBC-ENTMCNC: 33.5 G/DL — SIGNIFICANT CHANGE UP (ref 32–36)
MCHC RBC-ENTMCNC: 34.3 G/DL — SIGNIFICANT CHANGE UP (ref 32–36)
MCV RBC AUTO: 83.6 FL — SIGNIFICANT CHANGE UP (ref 80–100)
MCV RBC AUTO: 84 FL — SIGNIFICANT CHANGE UP (ref 80–100)
NRBC # BLD: 0 /100 WBCS — SIGNIFICANT CHANGE UP (ref 0–0)
NRBC # BLD: 0 /100 WBCS — SIGNIFICANT CHANGE UP (ref 0–0)
PHOSPHATE SERPL-MCNC: 3.2 MG/DL — SIGNIFICANT CHANGE UP (ref 2.5–4.5)
PHOSPHATE SERPL-MCNC: 5 MG/DL — HIGH (ref 2.5–4.5)
PLATELET # BLD AUTO: 250 K/UL — SIGNIFICANT CHANGE UP (ref 150–400)
PLATELET # BLD AUTO: 251 K/UL — SIGNIFICANT CHANGE UP (ref 150–400)
POTASSIUM SERPL-MCNC: 2.8 MMOL/L — CRITICAL LOW (ref 3.5–5.3)
POTASSIUM SERPL-MCNC: 3.3 MMOL/L — LOW (ref 3.5–5.3)
POTASSIUM SERPL-SCNC: 2.8 MMOL/L — CRITICAL LOW (ref 3.5–5.3)
POTASSIUM SERPL-SCNC: 3.3 MMOL/L — LOW (ref 3.5–5.3)
PROCALCITONIN SERPL-MCNC: 7.31 NG/ML — HIGH (ref 0.02–0.1)
PROT SERPL-MCNC: 6.5 GM/DL — SIGNIFICANT CHANGE UP (ref 6–8.3)
PROT SERPL-MCNC: 6.6 GM/DL — SIGNIFICANT CHANGE UP (ref 6–8.3)
RBC # BLD: 2.13 M/UL — LOW (ref 3.8–5.2)
RBC # BLD: 2.13 M/UL — LOW (ref 3.8–5.2)
RBC # FLD: 14.3 % — SIGNIFICANT CHANGE UP (ref 10.3–14.5)
RBC # FLD: 14.3 % — SIGNIFICANT CHANGE UP (ref 10.3–14.5)
SODIUM SERPL-SCNC: 138 MMOL/L — SIGNIFICANT CHANGE UP (ref 135–145)
SODIUM SERPL-SCNC: 139 MMOL/L — SIGNIFICANT CHANGE UP (ref 135–145)
TIBC SERPL-MCNC: 154 UG/DL — LOW (ref 220–430)
UIBC SERPL-MCNC: 87 UG/DL — LOW (ref 110–370)
WBC # BLD: 6.3 K/UL — SIGNIFICANT CHANGE UP (ref 3.8–10.5)
WBC # BLD: 7.05 K/UL — SIGNIFICANT CHANGE UP (ref 3.8–10.5)
WBC # FLD AUTO: 6.3 K/UL — SIGNIFICANT CHANGE UP (ref 3.8–10.5)
WBC # FLD AUTO: 7.05 K/UL — SIGNIFICANT CHANGE UP (ref 3.8–10.5)

## 2022-08-05 PROCEDURE — 99232 SBSQ HOSP IP/OBS MODERATE 35: CPT

## 2022-08-05 RX ORDER — ERYTHROPOIETIN 10000 [IU]/ML
20000 INJECTION, SOLUTION INTRAVENOUS; SUBCUTANEOUS ONCE
Refills: 0 | Status: COMPLETED | OUTPATIENT
Start: 2022-08-05 | End: 2022-08-08

## 2022-08-05 RX ORDER — POTASSIUM CHLORIDE 20 MEQ
40 PACKET (EA) ORAL ONCE
Refills: 0 | Status: COMPLETED | OUTPATIENT
Start: 2022-08-05 | End: 2022-08-05

## 2022-08-05 RX ADMIN — HEPARIN SODIUM 7500 UNIT(S): 5000 INJECTION INTRAVENOUS; SUBCUTANEOUS at 05:54

## 2022-08-05 RX ADMIN — PREGABALIN 1000 MICROGRAM(S): 225 CAPSULE ORAL at 12:29

## 2022-08-05 RX ADMIN — ATORVASTATIN CALCIUM 80 MILLIGRAM(S): 80 TABLET, FILM COATED ORAL at 21:23

## 2022-08-05 RX ADMIN — Medication 60 MILLIGRAM(S): at 10:00

## 2022-08-05 RX ADMIN — Medication 50 MILLIGRAM(S): at 09:57

## 2022-08-05 RX ADMIN — Medication 650 MILLIGRAM(S): at 01:42

## 2022-08-05 RX ADMIN — CARVEDILOL PHOSPHATE 25 MILLIGRAM(S): 80 CAPSULE, EXTENDED RELEASE ORAL at 17:33

## 2022-08-05 RX ADMIN — PIPERACILLIN AND TAZOBACTAM 25 GRAM(S): 4; .5 INJECTION, POWDER, LYOPHILIZED, FOR SOLUTION INTRAVENOUS at 01:45

## 2022-08-05 RX ADMIN — Medication 0.2 MILLIGRAM(S): at 09:56

## 2022-08-05 RX ADMIN — Medication 81 MILLIGRAM(S): at 12:29

## 2022-08-05 RX ADMIN — Medication 40 MILLIEQUIVALENT(S): at 18:12

## 2022-08-05 RX ADMIN — CARVEDILOL PHOSPHATE 25 MILLIGRAM(S): 80 CAPSULE, EXTENDED RELEASE ORAL at 09:57

## 2022-08-05 RX ADMIN — Medication 50 MILLIGRAM(S): at 21:23

## 2022-08-05 RX ADMIN — HEPARIN SODIUM 7500 UNIT(S): 5000 INJECTION INTRAVENOUS; SUBCUTANEOUS at 17:33

## 2022-08-05 RX ADMIN — Medication 0.2 MILLIGRAM(S): at 17:33

## 2022-08-05 NOTE — PROGRESS NOTE ADULT - PROBLEM SELECTOR PLAN 3
Missed dialysis on Monday  Nephrology on board, per note, plan for dialysis today and tomorrow
Missed dialysis on Monday  Nephrology on board, dialysis per nephrology

## 2022-08-05 NOTE — PROGRESS NOTE ADULT - PROBLEM SELECTOR PLAN 2
Hypertensive to 235/125, pulmonary edema, respiratory failure needing BiPAP  Resumed home meds and BP improved  Monitor BP and titrate BP meds
Hypertensive to 235/125, pulmonary edema, respiratory failure needing BiPAP  Resumed home meds and BP improved  Monitor BP and titrate BP meds

## 2022-08-05 NOTE — PROGRESS NOTE ADULT - PROBLEM SELECTOR PLAN 1
due to pulmonary edema, missed HD section  Respiratory status improved after dialysis, was able to wean off BiPAP to NC  Monitor respiratory status  On empiric antibiotics  Follow up blood culture  Check procalcitonin  Dialysis per nephrology
due to pulmonary edema, missed HD section  Was able to wean off NC oxygen  Slightly volume up  Monitor respiratory status  On empiric antibiotics  Follow up blood culture  Check procalcitonin- not collected, will reorder  Dialysis per nephrology

## 2022-08-05 NOTE — PROGRESS NOTE ADULT - NSPROGADDITIONALINFOA_GEN_ALL_CORE
Patient is still medically acute with hypoxic respiratory failure due to fluid overload. Need more section of dialysis per nephrology
Patient is still medically acute due to fluid overload. Nephrology planning for dialysis today and tomorrow. Discharge once clinically euvolemic.  Blood culture no growth to date. Ordered procalcitonin, but not collected, will reorder. If remain stable, procalcitonin is normal, hope to stop antibiotics over the weekends.

## 2022-08-06 LAB
ALBUMIN SERPL ELPH-MCNC: 2.6 G/DL — LOW (ref 3.3–5)
ALP SERPL-CCNC: 59 U/L — SIGNIFICANT CHANGE UP (ref 40–120)
ALT FLD-CCNC: 12 U/L — SIGNIFICANT CHANGE UP (ref 12–78)
ANION GAP SERPL CALC-SCNC: 6 MMOL/L — SIGNIFICANT CHANGE UP (ref 5–17)
AST SERPL-CCNC: 8 U/L — LOW (ref 15–37)
BILIRUB SERPL-MCNC: 0.4 MG/DL — SIGNIFICANT CHANGE UP (ref 0.2–1.2)
BUN SERPL-MCNC: 19 MG/DL — SIGNIFICANT CHANGE UP (ref 7–23)
CALCIUM SERPL-MCNC: 8.8 MG/DL — SIGNIFICANT CHANGE UP (ref 8.5–10.1)
CHLORIDE SERPL-SCNC: 100 MMOL/L — SIGNIFICANT CHANGE UP (ref 96–108)
CO2 SERPL-SCNC: 31 MMOL/L — SIGNIFICANT CHANGE UP (ref 22–31)
CREAT SERPL-MCNC: 4.59 MG/DL — HIGH (ref 0.5–1.3)
EGFR: 9 ML/MIN/1.73M2 — LOW
GLUCOSE BLDC GLUCOMTR-MCNC: 117 MG/DL — HIGH (ref 70–99)
GLUCOSE SERPL-MCNC: 99 MG/DL — SIGNIFICANT CHANGE UP (ref 70–99)
HCT VFR BLD CALC: 27.8 % — LOW (ref 34.5–45)
HGB BLD-MCNC: 9.1 G/DL — LOW (ref 11.5–15.5)
MAGNESIUM SERPL-MCNC: 2.2 MG/DL — SIGNIFICANT CHANGE UP (ref 1.6–2.6)
MCHC RBC-ENTMCNC: 27.2 PG — SIGNIFICANT CHANGE UP (ref 27–34)
MCHC RBC-ENTMCNC: 32.7 G/DL — SIGNIFICANT CHANGE UP (ref 32–36)
MCV RBC AUTO: 83.2 FL — SIGNIFICANT CHANGE UP (ref 80–100)
NRBC # BLD: 0 /100 WBCS — SIGNIFICANT CHANGE UP (ref 0–0)
PHOSPHATE SERPL-MCNC: 3.3 MG/DL — SIGNIFICANT CHANGE UP (ref 2.5–4.5)
PLATELET # BLD AUTO: 294 K/UL — SIGNIFICANT CHANGE UP (ref 150–400)
POTASSIUM SERPL-MCNC: 4.2 MMOL/L — SIGNIFICANT CHANGE UP (ref 3.5–5.3)
POTASSIUM SERPL-SCNC: 4.2 MMOL/L — SIGNIFICANT CHANGE UP (ref 3.5–5.3)
PROCALCITONIN SERPL-MCNC: 6.33 NG/ML — HIGH (ref 0.02–0.1)
PROT SERPL-MCNC: 7.2 GM/DL — SIGNIFICANT CHANGE UP (ref 6–8.3)
RAPID RVP RESULT: SIGNIFICANT CHANGE UP
RBC # BLD: 3.34 M/UL — LOW (ref 3.8–5.2)
RBC # FLD: 15 % — HIGH (ref 10.3–14.5)
SARS-COV-2 RNA SPEC QL NAA+PROBE: SIGNIFICANT CHANGE UP
SODIUM SERPL-SCNC: 137 MMOL/L — SIGNIFICANT CHANGE UP (ref 135–145)
WBC # BLD: 7.75 K/UL — SIGNIFICANT CHANGE UP (ref 3.8–10.5)
WBC # FLD AUTO: 7.75 K/UL — SIGNIFICANT CHANGE UP (ref 3.8–10.5)

## 2022-08-06 PROCEDURE — 99232 SBSQ HOSP IP/OBS MODERATE 35: CPT

## 2022-08-06 PROCEDURE — 71045 X-RAY EXAM CHEST 1 VIEW: CPT | Mod: 26

## 2022-08-06 RX ORDER — BUDESONIDE AND FORMOTEROL FUMARATE DIHYDRATE 160; 4.5 UG/1; UG/1
2 AEROSOL RESPIRATORY (INHALATION)
Refills: 0 | Status: DISCONTINUED | OUTPATIENT
Start: 2022-08-06 | End: 2022-08-08

## 2022-08-06 RX ORDER — ALBUTEROL 90 UG/1
2 AEROSOL, METERED ORAL EVERY 6 HOURS
Refills: 0 | Status: DISCONTINUED | OUTPATIENT
Start: 2022-08-06 | End: 2022-08-08

## 2022-08-06 RX ORDER — SENNA PLUS 8.6 MG/1
2 TABLET ORAL AT BEDTIME
Refills: 0 | Status: DISCONTINUED | OUTPATIENT
Start: 2022-08-06 | End: 2022-08-07

## 2022-08-06 RX ORDER — POLYETHYLENE GLYCOL 3350 17 G/17G
17 POWDER, FOR SOLUTION ORAL DAILY
Refills: 0 | Status: DISCONTINUED | OUTPATIENT
Start: 2022-08-06 | End: 2022-08-07

## 2022-08-06 RX ADMIN — HEPARIN SODIUM 7500 UNIT(S): 5000 INJECTION INTRAVENOUS; SUBCUTANEOUS at 06:08

## 2022-08-06 RX ADMIN — Medication 81 MILLIGRAM(S): at 11:49

## 2022-08-06 RX ADMIN — PANTOPRAZOLE SODIUM 40 MILLIGRAM(S): 20 TABLET, DELAYED RELEASE ORAL at 06:09

## 2022-08-06 RX ADMIN — Medication 60 MILLIGRAM(S): at 06:08

## 2022-08-06 RX ADMIN — Medication 3 MILLIGRAM(S): at 22:46

## 2022-08-06 RX ADMIN — ATORVASTATIN CALCIUM 80 MILLIGRAM(S): 80 TABLET, FILM COATED ORAL at 22:46

## 2022-08-06 RX ADMIN — BUDESONIDE AND FORMOTEROL FUMARATE DIHYDRATE 2 PUFF(S): 160; 4.5 AEROSOL RESPIRATORY (INHALATION) at 22:46

## 2022-08-06 RX ADMIN — Medication 200 MILLIGRAM(S): at 17:44

## 2022-08-06 RX ADMIN — Medication 50 MILLIGRAM(S): at 14:34

## 2022-08-06 RX ADMIN — SENNA PLUS 2 TABLET(S): 8.6 TABLET ORAL at 22:46

## 2022-08-06 RX ADMIN — PREGABALIN 1000 MICROGRAM(S): 225 CAPSULE ORAL at 11:49

## 2022-08-06 RX ADMIN — CARVEDILOL PHOSPHATE 25 MILLIGRAM(S): 80 CAPSULE, EXTENDED RELEASE ORAL at 06:08

## 2022-08-06 RX ADMIN — CARVEDILOL PHOSPHATE 25 MILLIGRAM(S): 80 CAPSULE, EXTENDED RELEASE ORAL at 17:28

## 2022-08-06 RX ADMIN — Medication 0.2 MILLIGRAM(S): at 17:28

## 2022-08-06 RX ADMIN — Medication 50 MILLIGRAM(S): at 06:09

## 2022-08-06 RX ADMIN — PIPERACILLIN AND TAZOBACTAM 25 GRAM(S): 4; .5 INJECTION, POWDER, LYOPHILIZED, FOR SOLUTION INTRAVENOUS at 16:27

## 2022-08-06 RX ADMIN — PIPERACILLIN AND TAZOBACTAM 25 GRAM(S): 4; .5 INJECTION, POWDER, LYOPHILIZED, FOR SOLUTION INTRAVENOUS at 01:48

## 2022-08-06 RX ADMIN — Medication 0.2 MILLIGRAM(S): at 06:08

## 2022-08-06 RX ADMIN — Medication 50 MILLIGRAM(S): at 22:50

## 2022-08-07 LAB
FOLATE SERPL-MCNC: 6.9 NG/ML — SIGNIFICANT CHANGE UP
GLUCOSE BLDC GLUCOMTR-MCNC: 88 MG/DL — SIGNIFICANT CHANGE UP (ref 70–99)
HCT VFR BLD CALC: 27.9 % — LOW (ref 34.5–45)
HGB BLD-MCNC: 9.2 G/DL — LOW (ref 11.5–15.5)
MCHC RBC-ENTMCNC: 27.7 PG — SIGNIFICANT CHANGE UP (ref 27–34)
MCHC RBC-ENTMCNC: 33 G/DL — SIGNIFICANT CHANGE UP (ref 32–36)
MCV RBC AUTO: 84 FL — SIGNIFICANT CHANGE UP (ref 80–100)
NRBC # BLD: 0 /100 WBCS — SIGNIFICANT CHANGE UP (ref 0–0)
PLATELET # BLD AUTO: 261 K/UL — SIGNIFICANT CHANGE UP (ref 150–400)
RBC # BLD: 3.32 M/UL — LOW (ref 3.8–5.2)
RBC # FLD: 15 % — HIGH (ref 10.3–14.5)
WBC # BLD: 7.2 K/UL — SIGNIFICANT CHANGE UP (ref 3.8–10.5)
WBC # FLD AUTO: 7.2 K/UL — SIGNIFICANT CHANGE UP (ref 3.8–10.5)

## 2022-08-07 PROCEDURE — 99232 SBSQ HOSP IP/OBS MODERATE 35: CPT

## 2022-08-07 RX ORDER — SENNA PLUS 8.6 MG/1
2 TABLET ORAL AT BEDTIME
Refills: 0 | Status: DISCONTINUED | OUTPATIENT
Start: 2022-08-07 | End: 2022-08-08

## 2022-08-07 RX ORDER — PANTOPRAZOLE SODIUM 20 MG/1
40 TABLET, DELAYED RELEASE ORAL
Refills: 0 | Status: DISCONTINUED | OUTPATIENT
Start: 2022-08-08 | End: 2022-08-08

## 2022-08-07 RX ORDER — LORATADINE 10 MG/1
10 TABLET ORAL DAILY
Refills: 0 | Status: DISCONTINUED | OUTPATIENT
Start: 2022-08-07 | End: 2022-08-08

## 2022-08-07 RX ORDER — FOLIC ACID 0.8 MG
1 TABLET ORAL DAILY
Refills: 0 | Status: DISCONTINUED | OUTPATIENT
Start: 2022-08-07 | End: 2022-08-08

## 2022-08-07 RX ORDER — FUROSEMIDE 40 MG
20 TABLET ORAL DAILY
Refills: 0 | Status: DISCONTINUED | OUTPATIENT
Start: 2022-08-07 | End: 2022-08-07

## 2022-08-07 RX ORDER — TIOTROPIUM BROMIDE 18 UG/1
1 CAPSULE ORAL; RESPIRATORY (INHALATION) AT BEDTIME
Refills: 0 | Status: DISCONTINUED | OUTPATIENT
Start: 2022-08-07 | End: 2022-08-08

## 2022-08-07 RX ORDER — SUCRALFATE 1 G
1 TABLET ORAL
Refills: 0 | Status: DISCONTINUED | OUTPATIENT
Start: 2022-08-07 | End: 2022-08-08

## 2022-08-07 RX ADMIN — HEPARIN SODIUM 7500 UNIT(S): 5000 INJECTION INTRAVENOUS; SUBCUTANEOUS at 18:10

## 2022-08-07 RX ADMIN — Medication 0.2 MILLIGRAM(S): at 18:12

## 2022-08-07 RX ADMIN — Medication 50 MILLIGRAM(S): at 21:48

## 2022-08-07 RX ADMIN — CARVEDILOL PHOSPHATE 25 MILLIGRAM(S): 80 CAPSULE, EXTENDED RELEASE ORAL at 06:09

## 2022-08-07 RX ADMIN — Medication 0.2 MILLIGRAM(S): at 06:10

## 2022-08-07 RX ADMIN — PIPERACILLIN AND TAZOBACTAM 25 GRAM(S): 4; .5 INJECTION, POWDER, LYOPHILIZED, FOR SOLUTION INTRAVENOUS at 18:18

## 2022-08-07 RX ADMIN — Medication 50 MILLIGRAM(S): at 12:55

## 2022-08-07 RX ADMIN — BUDESONIDE AND FORMOTEROL FUMARATE DIHYDRATE 2 PUFF(S): 160; 4.5 AEROSOL RESPIRATORY (INHALATION) at 06:12

## 2022-08-07 RX ADMIN — TIOTROPIUM BROMIDE 1 CAPSULE(S): 18 CAPSULE ORAL; RESPIRATORY (INHALATION) at 21:49

## 2022-08-07 RX ADMIN — ATORVASTATIN CALCIUM 80 MILLIGRAM(S): 80 TABLET, FILM COATED ORAL at 21:48

## 2022-08-07 RX ADMIN — CARVEDILOL PHOSPHATE 25 MILLIGRAM(S): 80 CAPSULE, EXTENDED RELEASE ORAL at 18:11

## 2022-08-07 RX ADMIN — PREGABALIN 1000 MICROGRAM(S): 225 CAPSULE ORAL at 12:57

## 2022-08-07 RX ADMIN — Medication 0.2 MILLIGRAM(S): at 23:00

## 2022-08-07 RX ADMIN — Medication 81 MILLIGRAM(S): at 12:56

## 2022-08-07 RX ADMIN — PANTOPRAZOLE SODIUM 40 MILLIGRAM(S): 20 TABLET, DELAYED RELEASE ORAL at 06:11

## 2022-08-07 RX ADMIN — PIPERACILLIN AND TAZOBACTAM 25 GRAM(S): 4; .5 INJECTION, POWDER, LYOPHILIZED, FOR SOLUTION INTRAVENOUS at 02:49

## 2022-08-07 RX ADMIN — BUDESONIDE AND FORMOTEROL FUMARATE DIHYDRATE 2 PUFF(S): 160; 4.5 AEROSOL RESPIRATORY (INHALATION) at 18:12

## 2022-08-07 RX ADMIN — Medication 60 MILLIGRAM(S): at 06:11

## 2022-08-07 RX ADMIN — Medication 50 MILLIGRAM(S): at 06:11

## 2022-08-07 RX ADMIN — Medication 200 MILLIGRAM(S): at 18:09

## 2022-08-07 NOTE — PROGRESS NOTE ADULT - PROBLEM SELECTOR PROBLEM 4
Hyperlipidemia, unspecified

## 2022-08-07 NOTE — PROGRESS NOTE ADULT - ASSESSMENT
77 years old female with h/o HTN, glaucoma, depression, ESRD (MWF, left AVF, anuric ), on 3L home O2 present to ED with complain of SOB. Last dialysis was on Friday. Missed dialysis on Monday due to nausea, vomiting and not feeling well. Reported progressively worsening SOB and orthopnea. No fever, chills.   Hypertensive to 235/125, tachypenic and required BiPAP in ED. WBC 13.52, Plt 239, K 4.8, hsTnT 31.5, lipase 119, proBNP 71990. CXR image reviewed, significant bilateral congestion/infiltrate unchanged from 7/14/22.     patient has chronic SOB , chronically feels "bad"   she has stated multiple times in the past that she would like to be on hospice and stop HD but then changes her mind. Frequent admissions for missed HD.   currently refusing hospice, refusing rehab.   discussed the finding of mod-severe MR with patient and daughter Socorro who stated that patient does not wish to pursue further treatment or follow-up   Patient had appointment with GI at [facility] 8/1/22 for her nausea and NBNB vomiting however missed appointment because of hospitalization. Patient did not have N/V during hospitalization, daughter will make another appointment. of note, patient was offered EGD at [facility] months ago and had refused. Daughter believes she will refuse EGD at her GI appointment at [facility] as well.      Assessment and Plan:   Acute respiratory failure with hypoxia d/t fluid overload sec to missed HD, now on NC doing well (patient on home O2 3L NC)   mod-severe MR, will need cardiology eval outpatient   ESRD on HD TTS via LUE AVF with good thrill , patient is anuric   HTN've emergency POA BP >200s/100s ; trops neg . denies CP /palpitations   Presumed HAP ?   leukocytosis, now normalized   Zosyn started by my colleague ,  completed 5 day course   procal is of no good utility given patient is ESRD   Blood Cx --NGTD ; patient not producing a lot of sputum   RVP neg   ECHO: pEF, severely enlarged LA, grade II DD, mod-severe MR, mild-mod CA, mild AS, mod pulm HTN   patient remains afebrile.   BP improved   repeat CXR 8/6 (reviewed myself) appears unchanged, awaiting official read.  clinically patient is nontoxic appearing and comfortable on NC       ESRD needing dialysis. MWF via Left AVF (AVF with good thrill) , AVF appears clean   --HD per renal     Normocytic Anemia due to ESRD   baseline hgb 7-8  s/p 2u PRBC   hgb 6.0 >>9.1  on epogen   --FOBT   --patient denies melena, BRBPR     HTN   continue with meds      Hyperlipidemia, unspecified.   -- continue with  statin.    H/O depression, denies SI/HI     Moderate protein-calorie malnutrition   prior dietician recs appreciated     Pulmonary HTN, possible KIMO/OHS ; possibly undiagnosed COPD on home O2   continue with 2-3L NC to maintain O2 sat >90%     Preventative measures   DNR/DNI   heparin SQ-dvt ppx  fall precautions     
77 years old female with h/o HTN, ESRD on dialysis, depression present to ED with complain of SOB. Last dialysis was on Friday. Missed dialysis on Monday due to nausea, vomiting and not feeling well. Reported progressively worsening SOB and orthopnea. No fever, chills.   Hypertensive to 235/125, tachypenic and required BiPAP in ED. WBC 13.52, Plt 239, K 4.8, hsTnT 31.5, lipase 119, proBNP 49453. CXR image reviewed, significant bilateral congestion/infiltrate. 
77 years old female with h/o HTN, glaucoma, depression, ESRD (MWF, left AVF, anuric ), on 3L home O2 present to ED with complain of SOB. Last dialysis was on Friday. Missed dialysis on Monday due to nausea, vomiting and not feeling well. Reported progressively worsening SOB and orthopnea. No fever, chills.   Hypertensive to 235/125, tachypenic and required BiPAP in ED. WBC 13.52, Plt 239, K 4.8, hsTnT 31.5, lipase 119, proBNP 60111. CXR image reviewed, significant bilateral congestion/infiltrate unchanged from 7/14/22.     Assessment and Plan:   Acute respiratory failure with hypoxia d/t fluid overload sec to missed HD, now on NC doing well (patient on home O2 3L NC)   mod-severe MR, will need cardiology eval outpatient   ESRD on HD TTS via LUE AVF with good thrill , patient is anuric   HTN've emergency POA BP >200s/100s ; trops neg . denies CP /palpitations   Presumed HAP ?   leukocytosis, now normalized   Zosyn started by my colleague , will complete 5 day course , last dose tomorrow   procal is of no good utility given patient is ESRD   Blood Cx --NGTD ; patient not producing a lot of sputum   RVP neg   ECHO: pEF, severely enlarged LA, grade II DD, mod-severe MR, mild-mod MA, mild AS, mod pulm HTN   patient remains afebrile.   BP improved   --repeat CXR       ESRD needing dialysis. MWF via Left AVF (AVF with good thrill) , AVF appears clean   --HD per renal     Normocytic Anemia due to ESRD   baseline hgb 7-8  s/p 2u PRBC   hgb 6.0 >>9.1  on epogen   --FOBT   --patient denies melena, BRBPR     HTN   continue with meds      Hyperlipidemia, unspecified.   -- continue with  statin.    H/O depression, denies SI/HI     Moderate protein-calorie malnutrition   prior dietician recs appreciated     Pulmonary HTN, possible KIMO/OHS ; possibly undiagnosed COPD on home O2   continue with 2-3L NC to maintain O2 sat >90%     Preventative measures   DNR/DNI   heparin SQ-dvt ppx  fall precautions   patient ambulating in room without difficulty   
77 years old female with h/o HTN, ESRD on dialysis, depression present to ED with complain of SOB. Last dialysis was on Friday. Missed dialysis on Monday due to nausea, vomiting and not feeling well. Reported progressively worsening SOB and orthopnea. No fever, chills.   Hypertensive to 235/125, tachypenic and required BiPAP in ED. WBC 13.52, Plt 239, K 4.8, hsTnT 31.5, lipase 119, proBNP 85919. CXR image reviewed, significant bilateral congestion/infiltrate.

## 2022-08-08 ENCOUNTER — TRANSCRIPTION ENCOUNTER (OUTPATIENT)
Age: 77
End: 2022-08-08

## 2022-08-08 VITALS
RESPIRATION RATE: 19 BRPM | OXYGEN SATURATION: 99 % | SYSTOLIC BLOOD PRESSURE: 174 MMHG | HEART RATE: 61 BPM | TEMPERATURE: 98 F | DIASTOLIC BLOOD PRESSURE: 73 MMHG

## 2022-08-08 LAB
ANA TITR SER: NEGATIVE — SIGNIFICANT CHANGE UP
ANION GAP SERPL CALC-SCNC: 8 MMOL/L — SIGNIFICANT CHANGE UP (ref 5–17)
BUN SERPL-MCNC: 38 MG/DL — HIGH (ref 7–23)
CALCIUM SERPL-MCNC: 8.9 MG/DL — SIGNIFICANT CHANGE UP (ref 8.5–10.1)
CHLORIDE SERPL-SCNC: 98 MMOL/L — SIGNIFICANT CHANGE UP (ref 96–108)
CO2 SERPL-SCNC: 28 MMOL/L — SIGNIFICANT CHANGE UP (ref 22–31)
CREAT SERPL-MCNC: 7.47 MG/DL — HIGH (ref 0.5–1.3)
CULTURE RESULTS: SIGNIFICANT CHANGE UP
CULTURE RESULTS: SIGNIFICANT CHANGE UP
EGFR: 5 ML/MIN/1.73M2 — LOW
GLUCOSE SERPL-MCNC: 151 MG/DL — HIGH (ref 70–99)
HCT VFR BLD CALC: 26.3 % — LOW (ref 34.5–45)
HGB BLD-MCNC: 8.9 G/DL — LOW (ref 11.5–15.5)
MCHC RBC-ENTMCNC: 28.2 PG — SIGNIFICANT CHANGE UP (ref 27–34)
MCHC RBC-ENTMCNC: 33.8 G/DL — SIGNIFICANT CHANGE UP (ref 32–36)
MCV RBC AUTO: 83.2 FL — SIGNIFICANT CHANGE UP (ref 80–100)
NRBC # BLD: 0 /100 WBCS — SIGNIFICANT CHANGE UP (ref 0–0)
PLATELET # BLD AUTO: 268 K/UL — SIGNIFICANT CHANGE UP (ref 150–400)
POTASSIUM SERPL-MCNC: 4.2 MMOL/L — SIGNIFICANT CHANGE UP (ref 3.5–5.3)
POTASSIUM SERPL-SCNC: 4.2 MMOL/L — SIGNIFICANT CHANGE UP (ref 3.5–5.3)
RBC # BLD: 3.16 M/UL — LOW (ref 3.8–5.2)
RBC # FLD: 14.7 % — HIGH (ref 10.3–14.5)
SODIUM SERPL-SCNC: 134 MMOL/L — LOW (ref 135–145)
SPECIMEN SOURCE: SIGNIFICANT CHANGE UP
SPECIMEN SOURCE: SIGNIFICANT CHANGE UP
WBC # BLD: 7.79 K/UL — SIGNIFICANT CHANGE UP (ref 3.8–10.5)
WBC # FLD AUTO: 7.79 K/UL — SIGNIFICANT CHANGE UP (ref 3.8–10.5)

## 2022-08-08 PROCEDURE — 99239 HOSP IP/OBS DSCHRG MGMT >30: CPT

## 2022-08-08 RX ORDER — CARVEDILOL PHOSPHATE 80 MG/1
1 CAPSULE, EXTENDED RELEASE ORAL
Qty: 0 | Refills: 0 | DISCHARGE
Start: 2022-08-08

## 2022-08-08 RX ORDER — TIOTROPIUM BROMIDE 18 UG/1
1 CAPSULE ORAL; RESPIRATORY (INHALATION)
Qty: 30 | Refills: 0
Start: 2022-08-08 | End: 2022-09-06

## 2022-08-08 RX ORDER — ALBUTEROL 90 UG/1
2 AEROSOL, METERED ORAL
Qty: 0 | Refills: 0 | DISCHARGE
Start: 2022-08-08

## 2022-08-08 RX ORDER — HYDRALAZINE HCL 50 MG
1 TABLET ORAL
Qty: 0 | Refills: 0 | DISCHARGE
Start: 2022-08-08

## 2022-08-08 RX ORDER — BUDESONIDE AND FORMOTEROL FUMARATE DIHYDRATE 160; 4.5 UG/1; UG/1
2 AEROSOL RESPIRATORY (INHALATION)
Qty: 0 | Refills: 0 | DISCHARGE
Start: 2022-08-08

## 2022-08-08 RX ORDER — SENNA PLUS 8.6 MG/1
2 TABLET ORAL
Qty: 0 | Refills: 0 | DISCHARGE
Start: 2022-08-08

## 2022-08-08 RX ORDER — NIFEDIPINE 30 MG
1 TABLET, EXTENDED RELEASE 24 HR ORAL
Qty: 0 | Refills: 0 | DISCHARGE
Start: 2022-08-08

## 2022-08-08 RX ORDER — ACETAMINOPHEN 500 MG
2 TABLET ORAL
Qty: 0 | Refills: 0 | DISCHARGE
Start: 2022-08-08

## 2022-08-08 RX ORDER — FOLIC ACID 0.8 MG
1 TABLET ORAL
Qty: 30 | Refills: 0
Start: 2022-08-08 | End: 2022-09-06

## 2022-08-08 RX ORDER — PANTOPRAZOLE SODIUM 20 MG/1
1 TABLET, DELAYED RELEASE ORAL
Qty: 0 | Refills: 0 | DISCHARGE
Start: 2022-08-08

## 2022-08-08 RX ORDER — LORATADINE 10 MG/1
1 TABLET ORAL
Qty: 30 | Refills: 0
Start: 2022-08-08 | End: 2022-09-06

## 2022-08-08 RX ORDER — SUCRALFATE 1 G
1 TABLET ORAL
Qty: 120 | Refills: 0
Start: 2022-08-08 | End: 2022-09-06

## 2022-08-08 RX ADMIN — LORATADINE 10 MILLIGRAM(S): 10 TABLET ORAL at 14:35

## 2022-08-08 RX ADMIN — HEPARIN SODIUM 7500 UNIT(S): 5000 INJECTION INTRAVENOUS; SUBCUTANEOUS at 06:11

## 2022-08-08 RX ADMIN — Medication 0.2 MILLIGRAM(S): at 06:10

## 2022-08-08 RX ADMIN — Medication 50 MILLIGRAM(S): at 06:10

## 2022-08-08 RX ADMIN — ERYTHROPOIETIN 20000 UNIT(S): 10000 INJECTION, SOLUTION INTRAVENOUS; SUBCUTANEOUS at 11:26

## 2022-08-08 RX ADMIN — CARVEDILOL PHOSPHATE 25 MILLIGRAM(S): 80 CAPSULE, EXTENDED RELEASE ORAL at 06:10

## 2022-08-08 RX ADMIN — Medication 50 MILLIGRAM(S): at 14:35

## 2022-08-08 RX ADMIN — Medication 1 MILLIGRAM(S): at 14:34

## 2022-08-08 RX ADMIN — BUDESONIDE AND FORMOTEROL FUMARATE DIHYDRATE 2 PUFF(S): 160; 4.5 AEROSOL RESPIRATORY (INHALATION) at 06:11

## 2022-08-08 RX ADMIN — Medication 0.2 MILLIGRAM(S): at 14:34

## 2022-08-08 RX ADMIN — Medication 60 MILLIGRAM(S): at 06:11

## 2022-08-08 RX ADMIN — PREGABALIN 1000 MICROGRAM(S): 225 CAPSULE ORAL at 14:34

## 2022-08-08 RX ADMIN — PANTOPRAZOLE SODIUM 40 MILLIGRAM(S): 20 TABLET, DELAYED RELEASE ORAL at 06:10

## 2022-08-08 RX ADMIN — Medication 81 MILLIGRAM(S): at 14:34

## 2022-08-08 NOTE — PROGRESS NOTE ADULT - SUBJECTIVE AND OBJECTIVE BOX
[facility] NEPHROLOGY SERVICES, [facility]  NEPHROLOGY AND HYPERTENSION  [address]  [address]  [place] [address]  [phone]    MD MATTHEW PATIÑO, MD BRIAN HANSON, MD NAYAN MCKEON, MD CARLITO PAYNE, MD RON BRANCH, MD COLBY RODRIGUEZ MD          Patient events noted  No distress  noted dropping hgb      MEDICATIONS  (STANDING):  aspirin  chewable 81 milliGRAM(s) Oral daily  atorvastatin 80 milliGRAM(s) Oral at bedtime  carvedilol 25 milliGRAM(s) Oral every 12 hours  cloNIDine 0.2 milliGRAM(s) Oral two times a day  cyanocobalamin 1000 MICROGram(s) Oral daily  heparin   Injectable 7500 Unit(s) SubCutaneous every 12 hours  hydrALAZINE 50 milliGRAM(s) Oral every 8 hours  NIFEdipine XL 60 milliGRAM(s) Oral daily  pantoprazole    Tablet 40 milliGRAM(s) Oral before breakfast  piperacillin/tazobactam IVPB.. 3.375 Gram(s) IV Intermittent every 12 hours    MEDICATIONS  (PRN):  acetaminophen     Tablet .. 650 milliGRAM(s) Oral every 6 hours PRN Mild Pain (1 - 3), Moderate Pain (4 - 6)  melatonin 3 milliGRAM(s) Oral at bedtime PRN Insomnia      08-04-22 @ 07:01  -  08-05-22 @ 07:00  --------------------------------------------------------  IN: 118 mL / OUT: 2000 mL / NET: -1882 mL    08-05-22 @ 07:01  -  08-05-22 @ 20:32  --------------------------------------------------------  IN: 570 mL / OUT: 2370 mL / NET: -1800 mL      PHYSICAL EXAM:      T(C): 37.3 (08-05-22 @ 19:18), Max: 37.3 (08-05-22 @ 18:58)  HR: 70 (08-05-22 @ 20:16) (63 - 78)  BP: 168/80 (08-05-22 @ 19:18) (121/60 - 168/80)  RR: 20 (08-05-22 @ 19:18) (17 - 20)  SpO2: 100% (08-05-22 @ 19:18) (94% - 100%)  Wt(kg): --  Lungs clear  Heart S1S2  Abd soft NT ND  Extremities:   tr edema                                    6.1    6.30  )-----------( 251      ( 05 Aug 2022 13:55 )             17.8     08-05    139  |  100  |  27<H>  ----------------------------<  132<H>  2.8<LL>   |  31  |  5.20<H>   during HD     Ca    8.0<L>      05 Aug 2022 13:55  Phos  3.2     08-05  Mg     2.1     08-05    TPro  6.6  /  Alb  2.4<L>  /  TBili  0.3  /  DBili  x   /  AST  10<L>  /  ALT  12  /  AlkPhos  58  08-05      LIVER FUNCTIONS - ( 05 Aug 2022 13:55 )  Alb: 2.4 g/dL / Pro: 6.6 gm/dL / ALK PHOS: 58 U/L / ALT: 12 U/L / AST: 10 U/L / GGT: x           Creatinine Trend: 5.20<--, 7.88<--, 10.60<--, 6.49<--, 8.67<--, 5.30<--      Assessment   ESRD; acute anemia   Hypokalemia, spurious as blood drawn during HD    Plan:  PRBC tx  Do not replace K;   Guaiac stool   Follow trend     Harry Osborn MD
77 years old female with h/o HTN, ESRD on dialysis, depression present to ED with complain of SOB. Last dialysis was on Friday. Missed dialysis on Monday due to nausea, vomiting and not feeling well. Reported progressively worsening SOB and orthopnea. No fever, chills.   Hypertensive to 235/125, tachypneic and required BiPAP in ED. WBC 13.52, Plt 239, K 4.8, hsTnT 31.5, lipase 119, proBNP 45802. CXR image reviewed, significant bilateral congestion/infiltrate.     Patient seen and examined bedside.   no overnight events   states she feels better, SOB improved     REVIEW OF SYSTEMS: remaining ROS negative     Physical Exam  GENERAL: NAD, well-developed, no increased WOB, speaking in full sentences   HEAD:  Atraumatic, Normocephalic  EYES: EOMI, PERRLA, conjunctiva and sclera clear  ENMT: No tonsillar erythema, exudates, or enlargement; Moist mucous membranes   NECK: Supple, No JVD   NERVOUS SYSTEM:  Alert & Oriented X3, Good concentration; no focal neuro deficits   CHEST/LUNG: CTAB;  No rales, rhonchi, wheezing, or rubs  HEART: Regular rate and rhythm; No murmurs, rubs, or gallops  ABDOMEN: Soft, Nontender, Nondistended; Bowel sounds present  EXTREMITIES:  2+ Peripheral Pulses b/l, No clubbing, cyanosis, or edema b/l        Labs                         9.1    7.75  )-----------( 294      ( 06 Aug 2022 07:10 )             27.8   08-06    137  |  100  |  19  ----------------------------<  99  4.2   |  31  |  4.59<H>    Ca    8.8      06 Aug 2022 07:10  Phos  3.3     08-06  Mg     2.2     08-06    TPro  7.2  /  Alb  2.6<L>  /  TBili  0.4  /  DBili  x   /  AST  8<L>  /  ALT  12  /  AlkPhos  59  08-06      Consultant(s) Notes Reveiwed [x ] Yes     Care Discussed with [x] Consultants  [ x] Patient  [ ] Family  [x ] /   [x ] Other; RN
NEPHROLOGY PROGRESS NOTE    CHIEF COMPLAINT:  ESRD    HPI:  Breathing better today after HD yesterday with 2.5 L UFR.  Oxygenating well.  Does not feel up to dialysis today.    ROS:  still c/o nausea    EXAM:  T(F): 98.4 (08-04-22 @ 04:27)  HR: 75 (08-04-22 @ 08:05)  BP: 139/70 (08-04-22 @ 04:27)  RR: 18 (08-04-22 @ 04:27)  SpO2: 97% (08-04-22 @ 08:05)    Conversant, in no apparent distress  Normal respiratory effort, lungs clear bilaterally  Heart RRR with no murmur, no peripheral edema         LABS                             8.4    13.52 )-----------( 239      ( 03 Aug 2022 05:27 )             25.0          08-03    133<L>  |  97  |  61<H>  ----------------------------<  132<H>  4.8   |  22  |  10.60<H>    Ca    9.2      03 Aug 2022 05:27  Mg     2.2     08-03    TPro  8.2  /  Alb  2.8<L>  /  TBili  0.6  /  DBili  x   /  AST  26  /  ALT  22  /  AlkPhos  72  08-03    ECHO shows normal EF and mod-severe MR           Assessment   ESRD; nausea, vomiting and diarrhea; leukocytosis   Persistent pulm infiltrates; r/o aspiration, infectious, fluid   Fluid overload HTN urgency, BP better    Plan  Basic serologies r/o underlying inflammatory changes  HD for tomorrow and Saturday      
[facility] NEPHROLOGY SERVICES, [facility]  NEPHROLOGY AND HYPERTENSION  [address]  [address]  [place] [address]  [phone]    MD MATTHEW PATIÑO MD ANDREY GONCHARUK, MD MADHU KORRAPATI, MD YELENA ROSENBERG, MD BINNY KOSHY, MD CHRISTOPHER CAPUTO, MD EDWARD BOVER, MD          Patient events noted    MEDICATIONS  (STANDING):  aspirin  chewable 81 milliGRAM(s) Oral daily  atorvastatin 80 milliGRAM(s) Oral at bedtime  budesonide 160 MICROgram(s)/formoterol 4.5 MICROgram(s) Inhaler 2 Puff(s) Inhalation two times a day  carvedilol 25 milliGRAM(s) Oral every 12 hours  cloNIDine 0.2 milliGRAM(s) Oral every 8 hours  cyanocobalamin 1000 MICROGram(s) Oral daily  folic acid 1 milliGRAM(s) Oral daily  heparin   Injectable 7500 Unit(s) SubCutaneous every 12 hours  hydrALAZINE 50 milliGRAM(s) Oral every 8 hours  loratadine 10 milliGRAM(s) Oral daily  NIFEdipine XL 60 milliGRAM(s) Oral daily  pantoprazole    Tablet 40 milliGRAM(s) Oral two times a day  senna 2 Tablet(s) Oral at bedtime  sucralfate 1 Gram(s) Oral four times a day  tiotropium 18 MICROgram(s) Capsule 1 Capsule(s) Inhalation at bedtime    MEDICATIONS  (PRN):  acetaminophen     Tablet .. 650 milliGRAM(s) Oral every 6 hours PRN Mild Pain (1 - 3), Moderate Pain (4 - 6)  ALBUTerol    90 MICROgram(s) HFA Inhaler 2 Puff(s) Inhalation every 6 hours PRN Shortness of Breath and/or Wheezing  guaiFENesin Oral Liquid (Sugar-Free) 200 milliGRAM(s) Oral every 6 hours PRN Cough  melatonin 3 milliGRAM(s) Oral at bedtime PRN Insomnia      08-07-22 @ 07:01  -  08-08-22 @ 07:00  --------------------------------------------------------  IN: 240 mL / OUT: 0 mL / NET: 240 mL    08-08-22 @ 07:01  -  08-08-22 @ 22:23  --------------------------------------------------------  IN: 0 mL / OUT: 2500 mL / NET: -2500 mL      PHYSICAL EXAM:      T(C): 36.7 (08-08-22 @ 16:36), Max: 36.7 (08-08-22 @ 16:36)  HR: 61 (08-08-22 @ 16:36) (58 - 66)  BP: 174/73 (08-08-22 @ 16:36) (137/65 - 174/73)  RR: 19 (08-08-22 @ 16:36) (16 - 20)  SpO2: 99% (08-08-22 @ 16:36) (94% - 99%)  Wt(kg): --  Lungs clear  Heart S1S2  Abd soft NT ND  Extremities:   tr edema                                    8.9    7.79  )-----------( 268      ( 08 Aug 2022 10:20 )             26.3     08-08    134<L>  |  98  |  38<H>  ----------------------------<  151<H>  4.2   |  28  |  7.47<H>    Ca    8.9      08 Aug 2022 10:20          Creatinine Trend: 7.47<--, 4.59<--, 5.20<--, 7.88<--, 10.60<--, 6.49<--      Assessment   ESRD; maintenance    Plan:  HD today   UF as tolerated  Retacrit;  Discharge planning     Harry Osborn MD
77 years old female with h/o HTN, ESRD on dialysis, depression present to ED with complain of SOB. Last dialysis was on Friday. Missed dialysis on Monday due to nausea, vomiting and not feeling well. Reported progressively worsening SOB and orthopnea. No fever, chills.   Hypertensive to 235/125, tachypneic and required BiPAP in ED. WBC 13.52, Plt 239, K 4.8, hsTnT 31.5, lipase 119, proBNP 68566. CXR image reviewed, significant bilateral congestion/infiltrate.     Patient seen and examined bedside.   no overnight events   patient has chronic SOB , chronically feels "bad"   she has stated multiple times in the past that she would like to be on hospice and stop HD but then changes her mind. Frequent admissions for missed HD.   she did not endorse to me that she was feeling ill. She stated she was feeling OK. denies SOB/CP/palpitations   Tele: no events , sinus     REVIEW OF SYSTEMS: remaining ROS negative      Vital Signs Last 24 Hrs  T(C): 36.6 (07 Aug 2022 17:30), Max: 37.1 (06 Aug 2022 23:35)  T(F): 97.9 (07 Aug 2022 17:30), Max: 98.7 (06 Aug 2022 23:35)  HR: 64 (07 Aug 2022 17:30) (60 - 64)  BP: 189/91 (07 Aug 2022 17:30) (149/77 - 189/91)  BP(mean): --  RR: 18 (07 Aug 2022 17:30) (18 - 19)  SpO2: 96% (07 Aug 2022 17:30) (96% - 99%)    Parameters below as of 07 Aug 2022 17:30  Patient On (Oxygen Delivery Method): nasal cannula      Physical Exam  GENERAL: NAD, well-developed, no increased WOB, speaking in full sentences   HEAD:  Atraumatic, Normocephalic  EYES: EOMI, PERRLA, conjunctiva and sclera clear  ENMT: No tonsillar erythema, exudates, or enlargement; Moist mucous membranes   NECK: Supple, No JVD   NERVOUS SYSTEM:  Alert & Oriented X3, Good concentration; no focal neuro deficits   CHEST/LUNG: CTAB;  No rales, rhonchi, wheezing, or rubs  HEART: Regular rate and rhythm; No murmurs, rubs, or gallops  ABDOMEN: Soft, Nontender, Nondistended; Bowel sounds present  EXTREMITIES:  2+ Peripheral Pulses b/l, No clubbing, cyanosis, or edema b/l        Labs                                    9.2    7.20  )-----------( 261      ( 07 Aug 2022 06:45 )             27.9   08-06    137  |  100  |  19  ----------------------------<  99  4.2   |  31  |  4.59<H>    Ca    8.8      06 Aug 2022 07:10  Phos  3.3     08-06  Mg     2.2     08-06    TPro  7.2  /  Alb  2.6<L>  /  TBili  0.4  /  DBili  x   /  AST  8<L>  /  ALT  12  /  AlkPhos  59  08-06    Consultant(s) Notes Reviewed [x ] Yes     Care Discussed with [x] Consultants  [ x] Patient  [x ] Family--Daughter Socorro Toro [phone]  [x ] /   [x ] Other; RN
Reported significant improvement of SOB, still have slight orthopnea, fatigue and decreased appetite    ROS:  All ROS were negative except fatigue, slight orthopnea, decreased appetite    Physical Exam  CONSTITUTIONAL: Well developed, well nourished, alert and cooperative, mild respiratory distress  EYES: PERRL, no scleral icterus  ENT: Mucosa moist, tongue normal.   NECK: Neck supple, trachea midline, non-tender  CARDIAC: Normal S1 and S2. Regular rate and rhythms. No murmurs. No Pedal edema. Peripheral pulses intact  LUNGS: Equal air entry both lungs. Slight basilar crackles +. Increased respiratory effort.   ABDOMEN: Soft, nondistended, nontender. No guarding or rebound tenderness. No hepatomegaly or splenomegaly. Bowel sound normal.  MUSCULOSKELETAL: Normocephalic, atraumatic. No significant deformity or joint abnormality  NEUROLOGICAL: No gross motor or sensory deficits  SKIN: no lesions or eruptions. Normal turgor  PSYCHIATRIC: A&O x 3, appropriate mood and affect        proBNP 55784, Na 133, K 4.8, WBC 13.52, Plt 239
Significant improvement in SOB. Reported 2 episode of watery diarrhea last night. No abdominal pain      ROS  All ROS were negative except SOB and diarrhea    Physical Exam  CONSTITUTIONAL: Well developed, well nourished, alert and cooperative, mild respiratory distress  EYES: PERRL,  no scleral icterus  ENT: Mucosa moist, tongue normal.   NECK: Neck supple, trachea midline, non-tender  CARDIAC: Normal S1 and S2. Regular rate and rhythms. No murmurs. Pedal edema +  LUNGS: Equal air entry both lungs. Basilar crackles +. Increase respiratory effort.   ABDOMEN: Soft, nondistended, nontender. No guarding or rebound tenderness. No hepatomegaly or splenomegaly. Bowel sound normal  MUSCULOSKELETAL: Normocephalic, atraumatic. No significant deformity or joint abnormality.  NEUROLOGICAL: No gross motor or sensory deficits.  SKIN: no lesions or eruptions. Normal turgor  PSYCHIATRIC: A&O x 3, appropriate mood and affect    Labs:                        8.4    13.52 )-----------( 239      ( 03 Aug 2022 05:27 )             25.0   08-03    133<L>  |  97  |  61<H>  ----------------------------<  132<H>  4.8   |  22  |  10.60<H>    Ca    9.2      03 Aug 2022 05:27  Mg     2.2     08-03    TPro  8.2  /  Alb  2.8<L>  /  TBili  0.6  /  DBili  x   /  AST  26  /  ALT  22  /  AlkPhos  72  08-03

## 2022-08-08 NOTE — DISCHARGE NOTE PROVIDER - NSDCFUADDAPPT_GEN_ALL_CORE_FT
It is important to see your primary physician as well as other necessary consultants within the next week to perform a comprehensive medical review.  Call their offices for an appointment as soon as you leave the hospital.  If you do not have a primary physician or cant reach him/her, contact the [facility] Physician Referral Service at [phone].  Your medical issues appear to be stable at this time, but if your symptoms recur or worsen, contact your physicians and/or return to the hospital if necessary.  If you encounter any issues or questions with your medication, call your physicians before stopping the medication.

## 2022-08-08 NOTE — DISCHARGE NOTE PROVIDER - NSDCMRMEDTOKEN_GEN_ALL_CORE_FT
acetaminophen 325 mg oral tablet: 2 tab(s) orally every 6 hours, As needed,   albuterol 90 mcg/inh inhalation aerosol: 2 puff(s) inhaled every 6 hours, As needed, Shortness of Breath and/or Wheezing  aspirin 81 mg oral tablet, chewable: 1 tab(s) orally once a day  atorvastatin 80 mg oral tablet: 1 tab(s) orally once a day  budesonide-formoterol 160 mcg-4.5 mcg/inh inhalation aerosol: 2 puff(s) inhaled 2 times a day  carvedilol 25 mg oral tablet: 1 tab(s) orally every 12 hours  cloNIDine 0.2 mg oral tablet: 1 tab(s) orally every 8 hours  cyanocobalamin 1000 mcg oral tablet: 1 tab(s) orally once a day  folic acid 1 mg oral tablet: 1 tab(s) orally once a day  guaiFENesin 100 mg/5 mL oral liquid: 10 milliliter(s) orally every 6 hours, As needed, Cough  hydrALAZINE 50 mg oral tablet: 1 tab(s) orally every 8 hours  loratadine 10 mg oral tablet: 1 tab(s) orally once a day  NIFEdipine 60 mg oral tablet, extended release: 1 tab(s) orally once a day  pantoprazole 40 mg oral delayed release tablet: 1 tab(s) orally 2 times a day  senna leaf extract oral tablet: 2 tab(s) orally once a day (at bedtime)  Spiriva HandiHaler 18 mcg inhalation capsule: 1 cap(s) inhaled once a day   sucralfate 1 g oral tablet: 1 tab(s) orally 4 times a day  Zofran ODT 4 mg oral tablet, disintegratin tab(s) orally 3 times a day

## 2022-08-08 NOTE — PROGRESS NOTE ADULT - REASON FOR ADMISSION
acute hypoxic respiratory failure due to fluid overload

## 2022-08-08 NOTE — DISCHARGE NOTE PROVIDER - CARE PROVIDER_API CALL
Michael Bolaños  INTERNAL MEDICINE  [address] [id number]  Phone: [phone]  Fax: [phone]  Follow Up Time: 1 week    your primary care doctor,   Phone: (   )    -  Fax: (   )    -  Follow Up Time: 1 week    your cardiologist,   Phone: (   )    -  Fax: (   )    -  Follow Up Time: 1 week

## 2022-08-08 NOTE — DISCHARGE NOTE PROVIDER - PROVIDER TOKENS
PROVIDER:[TOKEN:[[id number]],FOLLOWUP:[1 week]],FREE:[LAST:[your primary care doctor],PHONE:[(   )    -],FAX:[(   )    -],FOLLOWUP:[1 week]],FREE:[LAST:[your cardiologist],PHONE:[(   )    -],FAX:[(   )    -],FOLLOWUP:[1 week]]

## 2022-08-08 NOTE — DISCHARGE NOTE PROVIDER - HOSPITAL COURSE
77 years old female with h/o HTN, glaucoma, depression, ESRD (MWF, left AVF, anuric ), on 3L home O2 present to ED with complain of SOB. Last dialysis was on Friday. Missed dialysis on Monday due to nausea, vomiting and not feeling well. Reported progressively worsening SOB and orthopnea. No fever, chills.   Hypertensive to 235/125, tachypenic and required BiPAP in ED. WBC 13.52, Plt 239, K 4.8, hsTnT 31.5, lipase 119, proBNP 18431. CXR image reviewed, significant bilateral congestion/infiltrate unchanged from 7/14/22.     patient has chronic SOB , chronically feels "bad"   she has stated multiple times in the past that she would like to be on hospice and stop HD but then changes her mind. Frequent admissions for missed HD.   currently refusing hospice, refusing rehab.   discussed the finding of mod-severe MR with patient and daughter Socorro who stated that patient does not wish to pursue further treatment or follow-up   Patient had appointment with GI at [facility] 8/1/22 for her nausea and NBNB vomiting however missed appointment because of hospitalization. Patient did not have N/V during hospitalization, daughter will make another appointment. of note, patient was offered EGD at [facility] months ago and had refused. Daughter believes she will refuse EGD at her GI appointment at [facility] as well.      Vital Signs Last 24 Hrs  T(C): 36.6 (08 Aug 2022 13:45), Max: 36.6 (07 Aug 2022 17:30)  T(F): 97.8 (08 Aug 2022 13:45), Max: 97.9 (07 Aug 2022 17:30)  HR: 66 (08 Aug 2022 14:32) (58 - 66)  BP: 158/66 (08 Aug 2022 14:32) (137/65 - 189/91)  BP(mean): --  RR: 20 (08 Aug 2022 14:32) (16 - 20)  SpO2: 94% (08 Aug 2022 14:32) (94% - 99%)    Parameters below as of 08 Aug 2022 14:32  Patient On (Oxygen Delivery Method): nasal cannula  O2 Flow (L/min): 2    GENERAL: NAD, well-developed, no increased WOB, speaking in full sentences   HEAD:  Atraumatic, Normocephalic  EYES: EOMI, PERRLA, conjunctiva and sclera clear  ENMT: No tonsillar erythema, exudates, or enlargement; Moist mucous membranes   NECK: Supple, No JVD   NERVOUS SYSTEM:  Alert & Oriented X3, Good concentration; no focal neuro deficits   CHEST/LUNG: CTAB;  No rales, rhonchi, wheezing, or rubs  HEART: Regular rate and rhythm; No murmurs, rubs, or gallops  ABDOMEN: Soft, Nontender, Nondistended; Bowel sounds present  EXTREMITIES:  2+ Peripheral Pulses b/l, No clubbing, cyanosis, CALF TENDERNESS or edema b/l     DISCHARGE DIAGNOSES:     Acute respiratory failure with hypoxia d/t fluid overload sec to missed HD, now on NC doing well (patient on home O2 3L NC)   mod-severe MR, will need cardiology eval outpatient   ESRD on HD TTS via LUE AVF with good thrill , patient is anuric   HTN've emergency POA BP >200s/100s ; trops neg . denies CP /palpitations   Presumed HAP ?   leukocytosis, now normalized   Zosyn started by my colleague ,  completed 5 day course   procal is of no good utility given patient is ESRD   Blood Cx --NGTD ; patient not producing a lot of sputum   RVP neg   ECHO: pEF, severely enlarged LA, grade II DD, mod-severe MR, mild-mod TN, mild AS, mod pulm HTN   patient remains afebrile.   BP improved   repeat CXR 8/6 (reviewed myself) appears unchanged, awaiting official read.  clinically patient is nontoxic appearing and comfortable on NC       ESRD needing dialysis. MWF via Left AVF (AVF with good thrill) , AVF appears clean   -continue sched HD outpatient   s/p HD today     Normocytic Anemia due to ESRD   baseline hgb 7-8  s/p 2u PRBC   hgb 6.0 >>9.1  on epogen   --patient denies melena, BRBPR   --daughter will make follow-up GI appointment at [facility]     HTN   continue with meds      Hyperlipidemia, unspecified.   -- continue with  statin.    H/O depression, denies SI/HI     Moderate protein-calorie malnutrition   prior dietician recs appreciated     Pulmonary HTN, possible KIMO/OHS ; possibly undiagnosed COPD on home O2   continue with 2-3L NC to maintain O2 sat >90%       Discharge time : 40 min     RETURN PARAMETERS DISCUSSED WITH PATIENT, PATIENT EXPRESSED UNDERSTANDING AND IS AGREEABLE. DISCUSSED WITH PATIENT ON REFRAINING FROM DRIVING UNTIL FOLLOW-UP/ CLEARED BY PMD. PATIENT EXPRESSED UNDERSTANDING.

## 2022-08-08 NOTE — DISCHARGE NOTE PROVIDER - NSDCCPCAREPLAN_GEN_ALL_CORE_FT
PRINCIPAL DISCHARGE DIAGNOSIS  Diagnosis: Hypertensive emergency  Assessment and Plan of Treatment:       SECONDARY DISCHARGE DIAGNOSES  Diagnosis: ESRD needing dialysis  Assessment and Plan of Treatment:     Diagnosis: Hyperlipidemia, unspecified  Assessment and Plan of Treatment:     Diagnosis: Acute on chronic respiratory failure with hypoxia  Assessment and Plan of Treatment:

## 2022-08-08 NOTE — DISCHARGE NOTE NURSING/CASE MANAGEMENT/SOCIAL WORK - NSDCFUADDAPPT_GEN_ALL_CORE_FT
It is important to see your primary physician as well as other necessary consultants within the next week to perform a comprehensive medical review.  Call their offices for an appointment as soon as you leave the hospital.  If you do not have a primary physician or cant reach him/her, contact the [facility] Physician Referral Service at [phone].  Your medical issues appear to be stable at this time, but if your symptoms recur or worsen, contact your physicians and/or return to the hospital if necessary.  If you encounter any issues or questions with your medication, call your physicians before stopping the medication.

## 2022-08-08 NOTE — DISCHARGE NOTE NURSING/CASE MANAGEMENT/SOCIAL WORK - PATIENT PORTAL LINK FT
You can access the FollowMyHealth Patient Portal offered by [facility] by registering at the following website: [email or web address]. By joining [employer]’s FollowMyHealth portal, you will also be able to view your health information using other applications (apps) compatible with our system.

## 2022-08-08 NOTE — DISCHARGE NOTE NURSING/CASE MANAGEMENT/SOCIAL WORK - NSDCPEFALRISK_GEN_ALL_CORE
For information on Fall & Injury Prevention, visit: [email or web address] OR  [email or web address] OR  https://www.cdc.gov/steadi/patient.html

## 2022-08-11 DIAGNOSIS — Z79.82 LONG TERM (CURRENT) USE OF ASPIRIN: ICD-10-CM

## 2022-08-11 DIAGNOSIS — E78.5 HYPERLIPIDEMIA, UNSPECIFIED: ICD-10-CM

## 2022-08-11 DIAGNOSIS — J81.1 CHRONIC PULMONARY EDEMA: ICD-10-CM

## 2022-08-11 DIAGNOSIS — I16.1 HYPERTENSIVE EMERGENCY: ICD-10-CM

## 2022-08-11 DIAGNOSIS — I34.0 NONRHEUMATIC MITRAL (VALVE) INSUFFICIENCY: ICD-10-CM

## 2022-08-11 DIAGNOSIS — E11.22 TYPE 2 DIABETES MELLITUS WITH DIABETIC CHRONIC KIDNEY DISEASE: ICD-10-CM

## 2022-08-11 DIAGNOSIS — E44.0 MODERATE PROTEIN-CALORIE MALNUTRITION: ICD-10-CM

## 2022-08-11 DIAGNOSIS — Z99.2 DEPENDENCE ON RENAL DIALYSIS: ICD-10-CM

## 2022-08-11 DIAGNOSIS — E87.70 FLUID OVERLOAD, UNSPECIFIED: ICD-10-CM

## 2022-08-11 DIAGNOSIS — N18.6 END STAGE RENAL DISEASE: ICD-10-CM

## 2022-08-11 DIAGNOSIS — J96.21 ACUTE AND CHRONIC RESPIRATORY FAILURE WITH HYPOXIA: ICD-10-CM

## 2022-08-11 DIAGNOSIS — Z66 DO NOT RESUSCITATE: ICD-10-CM

## 2022-08-11 DIAGNOSIS — Z96.652 PRESENCE OF LEFT ARTIFICIAL KNEE JOINT: ICD-10-CM

## 2022-08-11 DIAGNOSIS — E87.6 HYPOKALEMIA: ICD-10-CM

## 2022-08-11 DIAGNOSIS — Z91.013 ALLERGY TO SEAFOOD: ICD-10-CM
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
## 2022-08-11 DIAGNOSIS — J18.9 PNEUMONIA, UNSPECIFIED ORGANISM: ICD-10-CM

## 2022-08-11 DIAGNOSIS — F32.A DEPRESSION, UNSPECIFIED: ICD-10-CM

## 2022-08-11 DIAGNOSIS — I12.0 HYPERTENSIVE CHRONIC KIDNEY DISEASE WITH STAGE 5 CHRONIC KIDNEY DISEASE OR END STAGE RENAL DISEASE: ICD-10-CM

## 2022-08-11 DIAGNOSIS — D63.1 ANEMIA IN CHRONIC KIDNEY DISEASE: ICD-10-CM

## 2022-08-14 RX ORDER — CARVEDILOL PHOSPHATE 80 MG/1
1 CAPSULE, EXTENDED RELEASE ORAL
Qty: 60 | Refills: 0
Start: 2022-08-14 | End: 2022-09-12

## 2022-08-14 RX ORDER — PREGABALIN 225 MG/1
1 CAPSULE ORAL
Qty: 30 | Refills: 0
Start: 2022-08-14 | End: 2022-09-12

## 2022-08-14 RX ORDER — NIFEDIPINE 30 MG
1 TABLET, EXTENDED RELEASE 24 HR ORAL
Qty: 30 | Refills: 0
Start: 2022-08-14 | End: 2022-09-12

## 2022-08-16 ENCOUNTER — INPATIENT (INPATIENT)
Facility: HOSPITAL | Age: 77
LOS: 3 days | Discharge: HOSPICE | End: 2022-08-20
Attending: INTERNAL MEDICINE | Admitting: INTERNAL MEDICINE
Payer: MEDICARE

## 2022-08-16 VITALS
TEMPERATURE: 96 F | WEIGHT: 171.08 LBS | DIASTOLIC BLOOD PRESSURE: 113 MMHG | SYSTOLIC BLOOD PRESSURE: 187 MMHG | OXYGEN SATURATION: 87 % | HEIGHT: 61 IN | RESPIRATION RATE: 45 BRPM | HEART RATE: 116 BPM

## 2022-08-16 DIAGNOSIS — Z96.652 PRESENCE OF LEFT ARTIFICIAL KNEE JOINT: Chronic | ICD-10-CM

## 2022-08-16 DIAGNOSIS — Z51.5 ENCOUNTER FOR PALLIATIVE CARE: ICD-10-CM

## 2022-08-16 DIAGNOSIS — Z90.49 ACQUIRED ABSENCE OF OTHER SPECIFIED PARTS OF DIGESTIVE TRACT: Chronic | ICD-10-CM

## 2022-08-16 DIAGNOSIS — F32.9 MAJOR DEPRESSIVE DISORDER, SINGLE EPISODE, UNSPECIFIED: ICD-10-CM

## 2022-08-16 DIAGNOSIS — J96.11 CHRONIC RESPIRATORY FAILURE WITH HYPOXIA: ICD-10-CM

## 2022-08-16 DIAGNOSIS — R62.7 ADULT FAILURE TO THRIVE: ICD-10-CM

## 2022-08-16 DIAGNOSIS — J96.22 ACUTE AND CHRONIC RESPIRATORY FAILURE WITH HYPERCAPNIA: ICD-10-CM

## 2022-08-16 DIAGNOSIS — Z99.2 DEPENDENCE ON RENAL DIALYSIS: ICD-10-CM

## 2022-08-16 DIAGNOSIS — E78.5 HYPERLIPIDEMIA, UNSPECIFIED: ICD-10-CM

## 2022-08-16 DIAGNOSIS — I13.2 HYPERTENSIVE HEART AND CHRONIC KIDNEY DISEASE WITH HEART FAILURE AND WITH STAGE 5 CHRONIC KIDNEY DISEASE, OR END STAGE RENAL DISEASE: ICD-10-CM

## 2022-08-16 DIAGNOSIS — D63.1 ANEMIA IN CHRONIC KIDNEY DISEASE: ICD-10-CM

## 2022-08-16 DIAGNOSIS — I50.33 ACUTE ON CHRONIC DIASTOLIC (CONGESTIVE) HEART FAILURE: ICD-10-CM

## 2022-08-16 DIAGNOSIS — J96.21 ACUTE AND CHRONIC RESPIRATORY FAILURE WITH HYPOXIA: ICD-10-CM

## 2022-08-16 DIAGNOSIS — Z66 DO NOT RESUSCITATE: ICD-10-CM

## 2022-08-16 DIAGNOSIS — N18.6 END STAGE RENAL DISEASE: ICD-10-CM

## 2022-08-16 DIAGNOSIS — E87.5 HYPERKALEMIA: ICD-10-CM

## 2022-08-16 DIAGNOSIS — F03.90 UNSPECIFIED DEMENTIA WITHOUT BEHAVIORAL DISTURBANCE: ICD-10-CM

## 2022-08-16 DIAGNOSIS — I34.0 NONRHEUMATIC MITRAL (VALVE) INSUFFICIENCY: ICD-10-CM

## 2022-08-16 DIAGNOSIS — I16.0 HYPERTENSIVE URGENCY: ICD-10-CM

## 2022-08-16 LAB
ALBUMIN SERPL ELPH-MCNC: 3.2 G/DL — LOW (ref 3.3–5)
ALP SERPL-CCNC: 71 U/L — SIGNIFICANT CHANGE UP (ref 40–120)
ALT FLD-CCNC: 22 U/L — SIGNIFICANT CHANGE UP (ref 12–78)
ANION GAP SERPL CALC-SCNC: 7 MMOL/L — SIGNIFICANT CHANGE UP (ref 5–17)
APTT BLD: 38.7 SEC — HIGH (ref 27.5–35.5)
AST SERPL-CCNC: 34 U/L — SIGNIFICANT CHANGE UP (ref 15–37)
BASE EXCESS BLDA CALC-SCNC: -2.4 MMOL/L — LOW (ref -2–3)
BASOPHILS # BLD AUTO: 0.08 K/UL — SIGNIFICANT CHANGE UP (ref 0–0.2)
BASOPHILS NFR BLD AUTO: 0.6 % — SIGNIFICANT CHANGE UP (ref 0–2)
BILIRUB SERPL-MCNC: 0.4 MG/DL — SIGNIFICANT CHANGE UP (ref 0.2–1.2)
BLOOD GAS COMMENTS ARTERIAL: SIGNIFICANT CHANGE UP
BUN SERPL-MCNC: 78 MG/DL — HIGH (ref 7–23)
CALCIUM SERPL-MCNC: 9 MG/DL — SIGNIFICANT CHANGE UP (ref 8.5–10.1)
CHLORIDE SERPL-SCNC: 101 MMOL/L — SIGNIFICANT CHANGE UP (ref 96–108)
CK MB BLD-MCNC: <1.1 % — SIGNIFICANT CHANGE UP (ref 0–3.5)
CK MB CFR SERPL CALC: <1 NG/ML — SIGNIFICANT CHANGE UP (ref 0.5–3.6)
CK SERPL-CCNC: 93 U/L — SIGNIFICANT CHANGE UP (ref 26–192)
CO2 BLDA-SCNC: 28 MMOL/L — HIGH (ref 19–24)
CO2 SERPL-SCNC: 25 MMOL/L — SIGNIFICANT CHANGE UP (ref 22–31)
CREAT SERPL-MCNC: 14.6 MG/DL — HIGH (ref 0.5–1.3)
EGFR: 2 ML/MIN/1.73M2 — LOW
EOSINOPHIL # BLD AUTO: 0.43 K/UL — SIGNIFICANT CHANGE UP (ref 0–0.5)
EOSINOPHIL NFR BLD AUTO: 3.3 % — SIGNIFICANT CHANGE UP (ref 0–6)
FLUAV AG NPH QL: SIGNIFICANT CHANGE UP
FLUBV AG NPH QL: SIGNIFICANT CHANGE UP
GAS PNL BLDA: SIGNIFICANT CHANGE UP
GLUCOSE SERPL-MCNC: 140 MG/DL — HIGH (ref 70–99)
HCO3 BLDA-SCNC: 26 MMOL/L — SIGNIFICANT CHANGE UP (ref 21–28)
HCT VFR BLD CALC: 29.7 % — LOW (ref 34.5–45)
HGB BLD-MCNC: 9.8 G/DL — LOW (ref 11.5–15.5)
HOROWITZ INDEX BLDA+IHG-RTO: 100 — SIGNIFICANT CHANGE UP
IMM GRANULOCYTES NFR BLD AUTO: 1 % — SIGNIFICANT CHANGE UP (ref 0–1.5)
INR BLD: 0.97 RATIO — SIGNIFICANT CHANGE UP (ref 0.88–1.16)
LYMPHOCYTES # BLD AUTO: 16.1 % — SIGNIFICANT CHANGE UP (ref 13–44)
LYMPHOCYTES # BLD AUTO: 2.12 K/UL — SIGNIFICANT CHANGE UP (ref 1–3.3)
MCHC RBC-ENTMCNC: 28 PG — SIGNIFICANT CHANGE UP (ref 27–34)
MCHC RBC-ENTMCNC: 33 G/DL — SIGNIFICANT CHANGE UP (ref 32–36)
MCV RBC AUTO: 84.9 FL — SIGNIFICANT CHANGE UP (ref 80–100)
MONOCYTES # BLD AUTO: 0.94 K/UL — HIGH (ref 0–0.9)
MONOCYTES NFR BLD AUTO: 7.1 % — SIGNIFICANT CHANGE UP (ref 2–14)
NEUTROPHILS # BLD AUTO: 9.5 K/UL — HIGH (ref 1.8–7.4)
NEUTROPHILS NFR BLD AUTO: 71.9 % — SIGNIFICANT CHANGE UP (ref 43–77)
NRBC # BLD: 0 /100 WBCS — SIGNIFICANT CHANGE UP (ref 0–0)
PCO2 BLDA: 59 MMHG — HIGH (ref 32–46)
PH BLDA: 7.25 — LOW (ref 7.35–7.45)
PLATELET # BLD AUTO: 248 K/UL — SIGNIFICANT CHANGE UP (ref 150–400)
PO2 BLDA: 182 MMHG — HIGH (ref 83–108)
POTASSIUM SERPL-MCNC: 5.9 MMOL/L — HIGH (ref 3.5–5.3)
POTASSIUM SERPL-SCNC: 5.9 MMOL/L — HIGH (ref 3.5–5.3)
PROT SERPL-MCNC: 8.4 GM/DL — HIGH (ref 6–8.3)
PROTHROM AB SERPL-ACNC: 11.5 SEC — SIGNIFICANT CHANGE UP (ref 10.5–13.4)
RBC # BLD: 3.5 M/UL — LOW (ref 3.8–5.2)
RBC # FLD: 16.4 % — HIGH (ref 10.3–14.5)
SAO2 % BLDA: 99.6 % — HIGH (ref 94–98)
SARS-COV-2 RNA SPEC QL NAA+PROBE: SIGNIFICANT CHANGE UP
SODIUM SERPL-SCNC: 133 MMOL/L — LOW (ref 135–145)
TROPONIN I, HIGH SENSITIVITY RESULT: 35.9 NG/L — SIGNIFICANT CHANGE UP
WBC # BLD: 13.2 K/UL — HIGH (ref 3.8–10.5)
WBC # FLD AUTO: 13.2 K/UL — HIGH (ref 3.8–10.5)

## 2022-08-16 PROCEDURE — 99291 CRITICAL CARE FIRST HOUR: CPT

## 2022-08-16 PROCEDURE — 93010 ELECTROCARDIOGRAM REPORT: CPT

## 2022-08-16 PROCEDURE — 71045 X-RAY EXAM CHEST 1 VIEW: CPT | Mod: 26

## 2022-08-16 PROCEDURE — 99285 EMERGENCY DEPT VISIT HI MDM: CPT

## 2022-08-16 RX ORDER — VANCOMYCIN HCL 1 G
1000 VIAL (EA) INTRAVENOUS ONCE
Refills: 0 | Status: COMPLETED | OUTPATIENT
Start: 2022-08-16 | End: 2022-08-16

## 2022-08-16 RX ORDER — CEFEPIME 1 G/1
2000 INJECTION, POWDER, FOR SOLUTION INTRAMUSCULAR; INTRAVENOUS ONCE
Refills: 0 | Status: DISCONTINUED | OUTPATIENT
Start: 2022-08-16 | End: 2022-08-16

## 2022-08-16 RX ORDER — PROPOFOL 10 MG/ML
5 INJECTION, EMULSION INTRAVENOUS
Qty: 1000 | Refills: 0 | Status: DISCONTINUED | OUTPATIENT
Start: 2022-08-16 | End: 2022-08-17

## 2022-08-16 RX ORDER — SODIUM BICARBONATE 1 MEQ/ML
50 SYRINGE (ML) INTRAVENOUS ONCE
Refills: 0 | Status: COMPLETED | OUTPATIENT
Start: 2022-08-16 | End: 2022-08-16

## 2022-08-16 RX ORDER — MIDAZOLAM HYDROCHLORIDE 1 MG/ML
2 INJECTION, SOLUTION INTRAMUSCULAR; INTRAVENOUS ONCE
Refills: 0 | Status: DISCONTINUED | OUTPATIENT
Start: 2022-08-16 | End: 2022-08-16

## 2022-08-16 RX ORDER — CHLORHEXIDINE GLUCONATE 213 G/1000ML
15 SOLUTION TOPICAL EVERY 12 HOURS
Refills: 0 | Status: DISCONTINUED | OUTPATIENT
Start: 2022-08-16 | End: 2022-08-17

## 2022-08-16 RX ORDER — HYDRALAZINE HCL 50 MG
10 TABLET ORAL ONCE
Refills: 0 | Status: COMPLETED | OUTPATIENT
Start: 2022-08-16 | End: 2022-08-16

## 2022-08-16 RX ORDER — CEFEPIME 1 G/1
INJECTION, POWDER, FOR SOLUTION INTRAMUSCULAR; INTRAVENOUS
Refills: 0 | Status: DISCONTINUED | OUTPATIENT
Start: 2022-08-16 | End: 2022-08-16

## 2022-08-16 RX ORDER — PROPOFOL 10 MG/ML
5 INJECTION, EMULSION INTRAVENOUS
Qty: 1000 | Refills: 0 | Status: DISCONTINUED | OUTPATIENT
Start: 2022-08-16 | End: 2022-08-16

## 2022-08-16 RX ORDER — CEFEPIME 1 G/1
2000 INJECTION, POWDER, FOR SOLUTION INTRAMUSCULAR; INTRAVENOUS EVERY 8 HOURS
Refills: 0 | Status: DISCONTINUED | OUTPATIENT
Start: 2022-08-16 | End: 2022-08-16

## 2022-08-16 RX ORDER — ALBUTEROL 90 UG/1
2.5 AEROSOL, METERED ORAL ONCE
Refills: 0 | Status: COMPLETED | OUTPATIENT
Start: 2022-08-16 | End: 2022-08-16

## 2022-08-16 RX ORDER — HYDRALAZINE HCL 50 MG
10 TABLET ORAL EVERY 8 HOURS
Refills: 0 | Status: DISCONTINUED | OUTPATIENT
Start: 2022-08-16 | End: 2022-08-18

## 2022-08-16 RX ORDER — SODIUM POLYSTYRENE SULFONATE 4.1 MEQ/G
30 POWDER, FOR SUSPENSION ORAL DAILY
Refills: 0 | Status: DISCONTINUED | OUTPATIENT
Start: 2022-08-16 | End: 2022-08-16

## 2022-08-16 RX ORDER — CHLORHEXIDINE GLUCONATE 213 G/1000ML
1 SOLUTION TOPICAL
Refills: 0 | Status: DISCONTINUED | OUTPATIENT
Start: 2022-08-16 | End: 2022-08-19

## 2022-08-16 RX ORDER — HEPARIN SODIUM 5000 [USP'U]/ML
5000 INJECTION INTRAVENOUS; SUBCUTANEOUS EVERY 8 HOURS
Refills: 0 | Status: DISCONTINUED | OUTPATIENT
Start: 2022-08-16 | End: 2022-08-16

## 2022-08-16 RX ORDER — CALCIUM GLUCONATE 100 MG/ML
1 VIAL (ML) INTRAVENOUS ONCE
Refills: 0 | Status: COMPLETED | OUTPATIENT
Start: 2022-08-16 | End: 2022-08-16

## 2022-08-16 RX ORDER — CEFEPIME 1 G/1
1000 INJECTION, POWDER, FOR SOLUTION INTRAMUSCULAR; INTRAVENOUS EVERY 24 HOURS
Refills: 0 | Status: DISCONTINUED | OUTPATIENT
Start: 2022-08-16 | End: 2022-08-17

## 2022-08-16 RX ADMIN — CHLORHEXIDINE GLUCONATE 15 MILLILITER(S): 213 SOLUTION TOPICAL at 19:18

## 2022-08-16 RX ADMIN — CHLORHEXIDINE GLUCONATE 1 APPLICATION(S): 213 SOLUTION TOPICAL at 21:37

## 2022-08-16 RX ADMIN — Medication 50 MILLIEQUIVALENT(S): at 18:32

## 2022-08-16 RX ADMIN — Medication 10 MILLIGRAM(S): at 19:38

## 2022-08-16 RX ADMIN — MIDAZOLAM HYDROCHLORIDE 2 MILLIGRAM(S): 1 INJECTION, SOLUTION INTRAMUSCULAR; INTRAVENOUS at 19:57

## 2022-08-16 RX ADMIN — Medication 100 GRAM(S): at 18:32

## 2022-08-16 RX ADMIN — PROPOFOL 2.33 MICROGRAM(S)/KG/MIN: 10 INJECTION, EMULSION INTRAVENOUS at 16:57

## 2022-08-16 RX ADMIN — Medication 10 MILLIGRAM(S): at 19:03

## 2022-08-16 RX ADMIN — PROPOFOL 2.33 MICROGRAM(S)/KG/MIN: 10 INJECTION, EMULSION INTRAVENOUS at 21:36

## 2022-08-16 RX ADMIN — CEFEPIME 100 MILLIGRAM(S): 1 INJECTION, POWDER, FOR SOLUTION INTRAMUSCULAR; INTRAVENOUS at 18:32

## 2022-08-16 RX ADMIN — HEPARIN SODIUM 5000 UNIT(S): 5000 INJECTION INTRAVENOUS; SUBCUTANEOUS at 18:33

## 2022-08-16 RX ADMIN — Medication 250 MILLIGRAM(S): at 19:24

## 2022-08-16 RX ADMIN — ALBUTEROL 2.5 MILLIGRAM(S): 90 AEROSOL, METERED ORAL at 19:44

## 2022-08-16 NOTE — H&P ADULT - NSHPLABSRESULTS_GEN_ALL_CORE
CBC                        9.8    13.20 )-----------( 248      ( 16 Aug 2022 15:50 )             29.7       08-16    133<L>  |  101  |  78<H>  ----------------------------<  140<H>  5.9<H>   |  25  |  14.60<H>    Ca    9.0      16 Aug 2022 16:49    TPro  8.4<H>  /  Alb  3.2<L>  /  TBili  0.4  /  DBili  x   /  AST  34  /  ALT  22  /  AlkPhos  71  08-16      Prothrombin Time and INR, Plasma (08.16.22 @ 15:50)    Prothrombin Time, Plasma: 11.5 sec    INR: 0.97. ratio    Troponin I, High Sensitivity (08.16.22 @ 16:49)    Troponin I, High Sensitivity Result: 35.9 ng/L

## 2022-08-16 NOTE — ED ADULT NURSE NOTE - NSIMPLEMENTINTERV_GEN_ALL_ED
Implemented All Fall Risk Interventions:  [place] to call system. Call bell, personal items and telephone within reach. Instruct patient to call for assistance. Room bathroom lighting operational. Non-slip footwear when patient is off stretcher. Physically safe environment: no spills, clutter or unnecessary equipment. Stretcher in lowest position, wheels locked, appropriate side rails in place. Provide visual cue, wrist band, yellow gown, etc. Monitor gait and stability. Monitor for mental status changes and reorient to person, place, and time. Review medications for side effects contributing to fall risk. Reinforce activity limits and safety measures with patient and family.

## 2022-08-16 NOTE — H&P ADULT - NSHPPHYSICALEXAM_GEN_ALL_CORE
GENERAL: NAD, sedated, elderly female orally intubated, well-groomed  HEAD:  Atraumatic, Normocephalic  EYES: conjunctiva and sclera clear  ENMT: ETT in place, moist oral mucosa  NECK: Supple  NERVOUS SYSTEM:  sedated but arousable, at times reaching for ETT, follows simple commands  CHEST/LUNG: bilateral rales, no wheeze  HEART: RRR  ABDOMEN: Soft, Nondistended; Bowel sounds present  EXTREMITIES:  2+ Peripheral Pulses, No clubbing, nails manicured with blue polish, mild bilateral LE edema, LUE AVF  SKIN: No rashes or lesions GENERAL: NAD, sedated, elderly female orally intubated, well-groomed  HEAD:  Atraumatic, Normocephalic  EYES: conjunctiva and sclera clear  ENMT: ETT in place, pink frothy ETT secretions, moist oral mucosa  NECK: Supple, + bilateral JVD  NERVOUS SYSTEM:  sedated but arousable, at times reaching for ETT, follows simple commands  CHEST/LUNG: bilateral rales, no wheeze  HEART: RRR  ABDOMEN: Soft, Nondistended; Bowel sounds present  EXTREMITIES:  2+ Peripheral Pulses, No clubbing, nails manicured with blue polish, mild bilateral LE edema, LUE AVF  SKIN: No rashes or lesions

## 2022-08-16 NOTE — H&P ADULT - HISTORY OF PRESENT ILLNESS
77F PMH HTN, ESRD on HD, depression, glaucoma presents for SOB, respiratory distress.  77F PMH HTN, HLD, moderate-severe mitral regurgitation, grade II diastolic dysfunction, ESRD on HD (with non compliance), chronic hypoxic respiratory failure on home O2 3LNC, chronic anemia, dementia, depression, glaucoma presents for SOB, respiratory distress. Found to be tripoding, unable to complete full sentences, with O2 sat 67% on RA and 85% on NRB. Placed on CPAP with sat improved to the 90s. Intubated in ED for respiratory distress. Pt was recently admitted 7/13-7/22 and 8/3 -8/8 for SOB secondary to volume overload from missed HD sessions. Goals of care discussion during prior admissions with pt and family deciding on DNR/DNI, no feeding tubes with MOLST filled out. Hospice had initially been discussed with pt/family as pt was refusing further HD sessions however later changed her mind and refused hospice and wanted to continue HD. Pt was discharged 8/8 on which day she received HD in the hospital. She has not gone for further HD sessions since discharge and now returns with SOB and respiratory distress. granddaughter reports pt to have cough with clear/white secretions. Granddaughter who is at bedside would like HD to continue however pt's daughter Socorro is next of kin and decision maker.     Pt's prior code status was not known in ED at time of evaluation and pt was intubated.   Hypertensive 225/128. Labs with WBC 13.2, K 5.9, BUN 78, Cr 14.6  ABG 7.25/59/182/26/100 (intubated on , RR 16, FIO2: 100%, PEEP 5)

## 2022-08-16 NOTE — PATIENT PROFILE ADULT - FALL HARM RISK - HARM RISK INTERVENTIONS
Assistance with ambulation/Assistance OOB with selected safe patient handling equipment/Communicate Risk of Fall with Harm to all staff/Reinforce activity limits and safety measures with patient and family/Review medications for side effects contributing to fall risk/Sit up slowly, dangle for a short time, stand at bedside before walking/Tailored Fall Risk Interventions/Toileting schedule using arm’s reach rule for commode and bathroom/Visual Cue: Yellow wristband and red socks/Bed in lowest position, wheels locked, appropriate side rails in place/Call bell, personal items and telephone in reach/Instruct patient to call for assistance before getting out of bed or chair/Non-slip footwear when patient is out of bed/[place] to call system/Physically safe environment - no spills, clutter or unnecessary equipment/Purposeful Proactive Rounding/Room/bathroom lighting operational, light cord in reach

## 2022-08-16 NOTE — ED ADULT TRIAGE NOTE - CHIEF COMPLAINT QUOTE
pt biba from home, family called shortness of breath , difficulty breathing onset last night and worsening. baseline dementia. on arrival pt tripoding room air 67% unable to complete sentences was able to state own weight, and mild headache. on non re-breather 85%. cpap  93% transported to hospital. 22G right hand received midazolam 5 mg ivp. md ortiz , RN yoly to bed 4 STAT. last dialysis on 8/8. recent admission from here on 8/8.

## 2022-08-16 NOTE — ED PROVIDER NOTE - ENMT, MLM
Airway patent, Nasal mucosa clear. Mouth with normal mucosa. Throat has no vesicles, no oropharyngeal exudates and uvula is midline. sob

## 2022-08-16 NOTE — H&P ADULT - ASSESSMENT
77F PMH HTN, HLD, moderate-severe mitral regurgitation, grade II diastolic dysfunction, ESRD on HD (with non compliance), chronic hypoxic respiratory failure on home O2 3LNC, chronic anemia, dementia, depression, glaucoma, prior admissions for SOB/respiratory distress and elevated BP presents for recurrent SOB and hypoxia in setting of missed HD sessions.     DX: acute on chronic hypoxic respiratory failure, acute hypercarbic respiratory failure, acute pulmonary edema, volume overload, ESRD on HD but non compliant, hyperkalemia, hypertensive urgency, acute on chronic diastolic dysfunction    NEURO  sedated on propofol  daily sedation vacation while intubated    CV  hydralazine 10mg IV q8 hours  can add clonidine patch  prior MOLST with no feeding tube  will give IV medication pending further GOC discussion with family    PULM  will need to discuss with family with wishes are to discontinue HD and make comfort vs continue HD with fluid removal  cont mechanical ventilation for now  will increase RR for acute hypercarbia (respiratory acidosis)  repeat ABG  pt and family's prior wishes were for DNR/DNI  GOC discussion ongoing with family    RENAL  appreciate renal input  family to decide on further renal replacement therapy  will medically treat hyperkalemia (sodium bicarb, kayexalate enema, albuterol, calcium ordered in ED)  EKG without significant ST changes (normal sinus rhythm)    ID  leukocytosis without fever  reactive vs infectious  check cultures (blood, sputum)  check MRSA, check urine legionella if pt has urine sample to provide  will give cefepime and 1 dose vanco to cover HCAP with recent hospitalizations and diffuse infiltrates on CXR though infiltrates can be due to fluid overload  COVID negative    GEN  prior goals of care with DNR/DNI, no feeding tubes, MOLST filled  will d/w daughter overall goals of care            77F PMH HTN, HLD, moderate-severe mitral regurgitation, grade II diastolic dysfunction, ESRD on HD (with non compliance), chronic hypoxic respiratory failure on home O2 3LNC, chronic anemia, dementia, depression, glaucoma, prior admissions for SOB/respiratory distress and elevated BP presents for recurrent SOB and hypoxia in setting of missed HD sessions.     DX: acute on chronic hypoxic respiratory failure, acute hypercarbic respiratory failure, acute pulmonary edema, volume overload, ESRD on HD but non compliant, hyperkalemia, hypertensive urgency, acute on chronic diastolic dysfunction    NEURO  sedated on propofol  daily sedation vacation while intubated    CV  hydralazine 10mg IV q8 hours  can add clonidine patch  prior MOLST with no feeding tube  will give IV medication pending further GOC discussion with family    PULM  will need to discuss with family with wishes are to discontinue HD and make comfort vs continue HD with fluid removal  cont mechanical ventilation for now  will increase RR for acute hypercarbia (respiratory acidosis)  repeat ABG  pt and family's prior wishes were for DNR/DNI  GOC discussion ongoing with family    RENAL  appreciate renal input  family to decide on further renal replacement therapy  will medically treat hyperkalemia (sodium bicarb, kayexalate enema, albuterol, calcium ordered in ED)  EKG without significant ST changes (normal sinus rhythm)    ID  leukocytosis without fever  reactive vs infectious  check cultures (blood, sputum)  check MRSA, check urine legionella if pt has urine sample to provide  will give cefepime and 1 dose vanco to cover HCAP with recent hospitalizations and diffuse infiltrates on CXR though infiltrates can be due to fluid overload  COVID negative    GEN  prior goals of care with DNR/DNI, no feeding tubes, MOLST filled  family and pt had previously requested hospice but then pt changed mind and wanted to continue HD  will d/w daughter overall goals of care            77F PMH HTN, HLD, moderate-severe mitral regurgitation, grade II diastolic dysfunction, ESRD on HD (with non compliance), chronic hypoxic respiratory failure on home O2 3LNC, chronic anemia, dementia, depression, glaucoma, prior admissions for SOB/respiratory distress and elevated BP presents for recurrent SOB and hypoxia in setting of missed HD sessions.     DX: acute on chronic hypoxic respiratory failure, acute hypercarbic respiratory failure, acute pulmonary edema, volume overload, ESRD on HD but non compliant, hyperkalemia, hypertensive urgency, acute on chronic diastolic dysfunction    NEURO  sedated on propofol  daily sedation vacation while intubated    CV  hydralazine 10mg IV q8 hours  can add clonidine patch  prior MOLST with no feeding tube  will give IV medication pending further GOC discussion with family    PULM  will need to discuss with family with wishes are to discontinue HD and make comfort vs continue HD with fluid removal  cont mechanical ventilation for now  will increase RR for acute hypercarbia (respiratory acidosis)  repeat ABG  pt and family's prior wishes were for DNR/DNI  GOC discussion ongoing with family    RENAL  appreciate renal input  family to decide on further renal replacement therapy  will medically treat hyperkalemia (sodium bicarb, kayexalate enema, albuterol, calcium ordered in ED)  EKG without significant ST changes (normal sinus rhythm)    ID  leukocytosis without fever  reactive vs infectious  check cultures (blood, sputum)  check MRSA, check urine legionella if pt has urine sample to provide  will give cefepime and 1 dose vanco to cover HCAP with recent hospitalizations and diffuse infiltrates on CXR though infiltrates can be due to fluid overload  COVID negative    GEN  prior goals of care with DNR/DNI, no feeding tubes, MOLST filled  family and pt had previously requested hospice but then pt changed mind and wanted to continue HD  will d/w daughter overall goals of care, daughter on way to hospital

## 2022-08-16 NOTE — ED ADULT TRIAGE NOTE - BP NONINVASIVE SYSTOLIC (MM HG)
187 V-Y Flap Text: The defect edges were debeveled with a #15 scalpel blade.  Given the location of the defect, shape of the defect and the proximity to free margins a V-Y flap was deemed most appropriate.  Using a sterile surgical marker, an appropriate advancement flap was drawn incorporating the defect and placing the expected incisions within the relaxed skin tension lines where possible.    The area thus outlined was incised deep to adipose tissue with a #15 scalpel blade.  The skin margins were undermined to an appropriate distance in all directions utilizing iris scissors.

## 2022-08-16 NOTE — CONSULT NOTE ADULT - SUBJECTIVE AND OBJECTIVE BOX
Patient chart reviewed, full consult to follow.     Patient non adherent to HD treatments;   In the past she expressed desire to withdraw care.   Intubated.    Will discuss with daughter goal of care;   No immediate indication for HD, will arrange for tomorrow if family agrees.     Thank you for the courtesy of this consultation. [facility], [facility]  NEPHROLOGY AND HYPERTENSION  [address]  [address]  [place] [address]  [phone]    MD MATTHEW PATIÑO MD ANDREY GONCHARUK, MD MADHU KORRAPATI, MD YELENA ROSENBERG, MD BINNY KOSHY, MD CHRISTOPHER CAPUTO, MD EDWARD BOVER, MD      Information from chart:  "Patient is a 77y old  Female who presents with a chief complaint of acute respiratory failure, intubated (16 Aug 2022 17:50)    HPI:  77F PMH HTN, HLD, moderate-severe mitral regurgitation, grade II diastolic dysfunction, ESRD on HD (with non compliance), chronic hypoxic respiratory failure on home O2 3LNC, chronic anemia, dementia, depression, glaucoma presents for SOB, respiratory distress. Found to be tripoding, unable to complete full sentences, with O2 sat 67% on RA and 85% on NRB. Placed on CPAP with sat improved to the 90s. Intubated in ED for respiratory distress. Pt was recently admitted - and 8/3 - for SOB secondary to volume overload from missed HD sessions. Goals of care discussion during prior admissions with pt and family deciding on DNR/DNI, no feeding tubes with MOLST filled out. Hospice had initially been discussed with pt/family as pt was refusing further HD sessions however later changed her mind and refused hospice and wanted to continue HD. Pt was discharged  on which day she received HD in the hospital. She has not gone for further HD sessions since discharge and now returns with SOB and respiratory distress. granddaughter reports pt to have cough with clear/white secretions. Granddaughter who is at bedside would like HD to continue however pt's daughter Socorro is next of kin and decision maker.     Pt's prior code status was not known in ED at time of evaluation and pt was intubated.   Hypertensive 225/128. Labs with WBC 13.2, K 5.9, BUN 78, Cr 14.6  ABG 7.25/59/182/26/100 (intubated on , RR 16, FIO2: 100%, PEEP 5) (16 Aug 2022 17:50)   "      Patient non adherent to HD treatments;   In the past she expressed desire to withdraw care.   Intubated.    PAST MEDICAL & SURGICAL HISTORY:  HTN (hypertension)      ESRD on dialysis      Asthma      DM (diabetes mellitus)      Dyslipidemia      Noncompliance with treatment      Severe anemia      On home oxygen therapy      History of cholecystectomy      History of left knee replacement        FAMILY HISTORY:    Allergies    No Known Drug Allergies  shellfish (Swelling)    Intolerances      Home Medications:  acetaminophen 325 mg oral tablet: 2 tab(s) orally every 6 hours, As needed,  (08 Aug 2022 16:20)  albuterol 90 mcg/inh inhalation aerosol: 2 puff(s) inhaled every 6 hours, As needed, Shortness of Breath and/or Wheezing (08 Aug 2022 16:20)  aspirin 81 mg oral tablet, chewable: 1 tab(s) orally once a day (2022 16:36)  atorvastatin 80 mg oral tablet: 1 tab(s) orally once a day (2022 16:36)  budesonide-formoterol 160 mcg-4.5 mcg/inh inhalation aerosol: 2 puff(s) inhaled 2 times a day (08 Aug 2022 16:20)  cloNIDine 0.2 mg oral tablet: 1 tab(s) orally every 8 hours (08 Aug 2022 16:20)  guaiFENesin 100 mg/5 mL oral liquid: 10 milliliter(s) orally every 6 hours, As needed, Cough (08 Aug 2022 16:20)  hydrALAZINE 50 mg oral tablet: 1 tab(s) orally every 8 hours (08 Aug 2022 16:20)  pantoprazole 40 mg oral delayed release tablet: 1 tab(s) orally 2 times a day (08 Aug 2022 16:20)  senna leaf extract oral tablet: 2 tab(s) orally once a day (at bedtime) (08 Aug 2022 16:20)  Zofran ODT 4 mg oral tablet, disintegratin tab(s) orally 3 times a day (2022 18:26)    MEDICATIONS  (STANDING):  cefepime   IVPB 1000 milliGRAM(s) IV Intermittent every 24 hours  chlorhexidine 0.12% Liquid 15 milliLiter(s) Oral Mucosa every 12 hours  chlorhexidine 2% Cloths 1 Application(s) Topical <User Schedule>  hydrALAZINE Injectable 10 milliGRAM(s) IV Push every 8 hours  propofol Infusion 5.004 MICROgram(s)/kG/Min (2.33 mL/Hr) IV Continuous <Continuous>    MEDICATIONS  (PRN):    Vital Signs Last 24 Hrs  T(C): 35.6 (16 Aug 2022 20:24), Max: 35.7 (16 Aug 2022 15:27)  T(F): 96.1 (16 Aug 2022 20:24), Max: 96.3 (16 Aug 2022 15:27)  HR: 78 (16 Aug 2022 22:00) (78 - 116)  BP: 188/93 (16 Aug 2022 22:00) (167/106 - 225/128)  BP(mean): 118 (16 Aug 2022 22:00) (111 - 204)  RR: 17 (16 Aug 2022 22:00) (17 - 45)  SpO2: 100% (16 Aug 2022 22:00) (87% - 100%)    Parameters below as of 16 Aug 2022 20:24  Patient On (Oxygen Delivery Method): ventilator      Mode: AC/ CMV (Assist Control/ Continuous Mandatory Ventilation)  RR (machine): 20  TV (machine): 400  FiO2: 90  PEEP: 5  ITime: 1  MAP: 10  PIP: 20    Daily Height in cm: 154.94 (16 Aug 2022 15:27)    Daily Weight in k.9 (16 Aug 2022 20:24)    CAPILLARY BLOOD GLUCOSE        PHYSICAL EXAM:      T(C): 35.6 (22 @ 20:24), Max: 35.7 (22 @ 15:27)  HR: 78 (22 @ 22:00) (78 - 116)  BP: 188/93 (22 @ 22:00) (167/106 - 225/128)  RR: 17 (22 @ 22:00) (17 - 45)  SpO2: 100% (22 @ 22:00) (87% - 100%)  Wt(kg): --  Lungs clear  Heart S1S2  Abd soft NT ND  Extremities:   tr edema  AVF LUE + bruit and thrill                  133<L>  |  101  |  78<H>  ----------------------------<  140<H>  5.9<H>   |  25  |  14.60<H>    Ca    9.0      16 Aug 2022 16:49    TPro  8.4<H>  /  Alb  3.2<L>  /  TBili  0.4  /  DBili  x   /  AST  34  /  ALT  22  /  AlkPhos  71  -                          9.8    13.20 )-----------( 248      ( 16 Aug 2022 15:50 )             29.7     Creatinine Trend: 14.60<--, 7.47<--, 4.59<--, 5.20<--, 7.88<--, 10.60<--    ABG - ( 16 Aug 2022 16:08 )  pH, Arterial: 7.25  pH, Blood: x     /  pCO2: 59    /  pO2: 182   / HCO3: 26    / Base Excess: -2.4  /  SaO2: 99.6                  Assessment   ESRD: non adherent; respiratory failure;     Plan  Will discuss with daughter goal of care;   No immediate indication for HD, will arrange for tomorrow if family agrees.       Harry Osborn MD

## 2022-08-16 NOTE — ED PROVIDER NOTE - PROGRESS NOTE DETAILS
ICU called pt was intubated, pt's granddaughter has her mother on the phone who states pt is DNR/DNI, wants pt extubated, however, explained to the family t

## 2022-08-16 NOTE — ED PROVIDER NOTE - OBJECTIVE STATEMENT
76 y/o F with a PMHx of HTN, DM, asthma, severe anemia, and ESRD (last received dialysis was last Monday and homo 2 4 liters) is BIBA to the ED c/o respiratory distress. Patient is accompanied by her granddaughter. Patient has baseline dementia. Granddaughter states that SOB worsened since last night. EMS reports that the room air stats were in the 60's and improved to 93% with BiPAP. Patient was restless and received CPAP and midazolam 5mg. Patient came in on CPAP and was lethargic and nonresponsive. Respiratory called for bedside confirmation. Granddaughter states that "everything should be done." 78 y/o F with a PMHx of HTN, DM, asthma, severe anemia, and ESRD (last received dialysis was last Monday and homo 2 4 liters) is BIBA to the ED c/o respiratory distress. Patient is accompanied by her granddaughter. Patient has baseline dementia. Granddaughter states that SOB worsened since last night. EMS reports that RA sat was in the 60's and improved to 93% with BiPAP. Patient was restless and received CPAP and midazolam 5mg. Patient came in on CPAP and was lethargic and nonresponsive. Respiratory called for bedside confirmation. Granddaughter states that "everything should be done." 78 y/o F with a PMHx of HTN, DM, asthma, severe anemia, and ESRD (last received dialysis was last Monday and homo 2 4 liters) is BIBA to the ED c/o respiratory distress. Patient is accompanied by her granddaughter. Patient has baseline dementia. Granddaughter states that SOB worsened since last night. EMS reports that RA sat was in the 60's and improved to 93% with BiPAP. Patient was restless and received CPAP and midazolam 5mg. Patient came in on CPAP and was lethargic and nonresponsive. Respiratory called for bedside confirmation, pt's granddaughter asked about code status, states that she is full code, everything to be done

## 2022-08-17 LAB
GRAM STN FLD: SIGNIFICANT CHANGE UP
HAV IGM SER-ACNC: SIGNIFICANT CHANGE UP
HBV CORE IGM SER-ACNC: SIGNIFICANT CHANGE UP
HBV SURFACE AG SER-ACNC: SIGNIFICANT CHANGE UP
HCV AB S/CO SERPL IA: 0.16 S/CO — SIGNIFICANT CHANGE UP (ref 0–0.99)
HCV AB SERPL-IMP: SIGNIFICANT CHANGE UP
MRSA PCR RESULT.: SIGNIFICANT CHANGE UP
S AUREUS DNA NOSE QL NAA+PROBE: SIGNIFICANT CHANGE UP
SPECIMEN SOURCE: SIGNIFICANT CHANGE UP

## 2022-08-17 PROCEDURE — 99497 ADVNCD CARE PLAN 30 MIN: CPT

## 2022-08-17 PROCEDURE — 99291 CRITICAL CARE FIRST HOUR: CPT

## 2022-08-17 RX ORDER — DIAZEPAM 5 MG
2 TABLET ORAL EVERY 4 HOURS
Refills: 0 | Status: DISCONTINUED | OUTPATIENT
Start: 2022-08-17 | End: 2022-08-17

## 2022-08-17 RX ORDER — HYDRALAZINE HCL 50 MG
10 TABLET ORAL ONCE
Refills: 0 | Status: COMPLETED | OUTPATIENT
Start: 2022-08-17 | End: 2022-08-17

## 2022-08-17 RX ORDER — MIDAZOLAM HYDROCHLORIDE 1 MG/ML
2 INJECTION, SOLUTION INTRAMUSCULAR; INTRAVENOUS EVERY 4 HOURS
Refills: 0 | Status: DISCONTINUED | OUTPATIENT
Start: 2022-08-17 | End: 2022-08-17

## 2022-08-17 RX ORDER — FENTANYL CITRATE 50 UG/ML
50 INJECTION INTRAVENOUS EVERY 4 HOURS
Refills: 0 | Status: DISCONTINUED | OUTPATIENT
Start: 2022-08-17 | End: 2022-08-18

## 2022-08-17 RX ORDER — FENTANYL CITRATE 50 UG/ML
0.5 INJECTION INTRAVENOUS
Qty: 2500 | Refills: 0 | Status: DISCONTINUED | OUTPATIENT
Start: 2022-08-17 | End: 2022-08-17

## 2022-08-17 RX ORDER — ROBINUL 0.2 MG/ML
0.2 INJECTION INTRAMUSCULAR; INTRAVENOUS EVERY 8 HOURS
Refills: 0 | Status: DISCONTINUED | OUTPATIENT
Start: 2022-08-17 | End: 2022-08-19

## 2022-08-17 RX ORDER — FENTANYL CITRATE 50 UG/ML
50 INJECTION INTRAVENOUS ONCE
Refills: 0 | Status: DISCONTINUED | OUTPATIENT
Start: 2022-08-17 | End: 2022-08-17

## 2022-08-17 RX ORDER — FENTANYL CITRATE 50 UG/ML
0.5 INJECTION INTRAVENOUS
Qty: 2500 | Refills: 0 | Status: DISCONTINUED | OUTPATIENT
Start: 2022-08-17 | End: 2022-08-18

## 2022-08-17 RX ORDER — MIDAZOLAM HYDROCHLORIDE 1 MG/ML
2 INJECTION, SOLUTION INTRAMUSCULAR; INTRAVENOUS EVERY 4 HOURS
Refills: 0 | Status: DISCONTINUED | OUTPATIENT
Start: 2022-08-17 | End: 2022-08-18

## 2022-08-17 RX ADMIN — FENTANYL CITRATE 50 MICROGRAM(S): 50 INJECTION INTRAVENOUS at 16:45

## 2022-08-17 RX ADMIN — PROPOFOL 2.33 MICROGRAM(S)/KG/MIN: 10 INJECTION, EMULSION INTRAVENOUS at 13:32

## 2022-08-17 RX ADMIN — FENTANYL CITRATE 3.6 MICROGRAM(S)/KG/HR: 50 INJECTION INTRAVENOUS at 16:29

## 2022-08-17 RX ADMIN — PROPOFOL 2.33 MICROGRAM(S)/KG/MIN: 10 INJECTION, EMULSION INTRAVENOUS at 05:10

## 2022-08-17 RX ADMIN — FENTANYL CITRATE 50 MICROGRAM(S): 50 INJECTION INTRAVENOUS at 15:09

## 2022-08-17 RX ADMIN — Medication 10 MILLIGRAM(S): at 08:09

## 2022-08-17 RX ADMIN — Medication 10 MILLIGRAM(S): at 05:10

## 2022-08-17 RX ADMIN — PROPOFOL 2.33 MICROGRAM(S)/KG/MIN: 10 INJECTION, EMULSION INTRAVENOUS at 07:58

## 2022-08-17 RX ADMIN — CHLORHEXIDINE GLUCONATE 1 APPLICATION(S): 213 SOLUTION TOPICAL at 05:11

## 2022-08-17 RX ADMIN — Medication 10 MILLIGRAM(S): at 20:54

## 2022-08-17 RX ADMIN — FENTANYL CITRATE 50 MICROGRAM(S): 50 INJECTION INTRAVENOUS at 16:28

## 2022-08-17 RX ADMIN — Medication 10 MILLIGRAM(S): at 13:32

## 2022-08-17 RX ADMIN — ROBINUL 0.2 MILLIGRAM(S): 0.2 INJECTION INTRAMUSCULAR; INTRAVENOUS at 23:55

## 2022-08-17 RX ADMIN — FENTANYL CITRATE 50 MICROGRAM(S): 50 INJECTION INTRAVENOUS at 14:54

## 2022-08-17 RX ADMIN — FENTANYL CITRATE 3.6 MICROGRAM(S)/KG/HR: 50 INJECTION INTRAVENOUS at 19:30

## 2022-08-17 RX ADMIN — CHLORHEXIDINE GLUCONATE 15 MILLILITER(S): 213 SOLUTION TOPICAL at 05:10

## 2022-08-17 RX ADMIN — PROPOFOL 2.33 MICROGRAM(S)/KG/MIN: 10 INJECTION, EMULSION INTRAVENOUS at 00:37

## 2022-08-17 RX ADMIN — FENTANYL CITRATE 3.6 MICROGRAM(S)/KG/HR: 50 INJECTION INTRAVENOUS at 17:23

## 2022-08-17 RX ADMIN — Medication 10 MILLIGRAM(S): at 00:34

## 2022-08-17 RX ADMIN — PROPOFOL 2.33 MICROGRAM(S)/KG/MIN: 10 INJECTION, EMULSION INTRAVENOUS at 10:24

## 2022-08-17 NOTE — GOALS OF CARE CONVERSATION - ADVANCED CARE PLANNING - CONVERSATION DETAILS
77F with prior MOLST DNR/DNI presents with hypoxic and hypercarbic respiratory failure. Intubated in ED.    PMH significant for ESRD, diastolic heart failure, mitral regurgitation     Pt has been declining renal replacement therapy and was recently discharged 8/8 after HD but has not gone for outpatient HD.    Prior goals of care discussion with patient and family resulted initially with discussions regarding hospice but reportedly then had change of mind and wanted to proceed with HD.    Spoke to daughter in person and granddaughter who states pt after returning home was adamant about not continuing HD.    Pt's known prior wishes were to be DNR/DNI.     Discussed with daughter that without further HD and with underlying congestive heart failure and valvular disease pt will not survive. Daughter aware and understands that pt would be removed from mechanical ventilation if the wishes are to maintain pt's DNI.    Daughter agrees and states for tonight to continue things "as is" and that she will return tomorrow morning and likely will proceed with palliative extubation with full comfort measures. No further HD.

## 2022-08-17 NOTE — PROGRESS NOTE ADULT - ASSESSMENT
77F w/ HTN, HLD, mod-severe MR, grade 2 diatsolic dysfunction, ESRD on HD, chronic hypoxic respiratory failure on home O2, dementia. Presented w/ SOB and respiratory distress. In ED, pt was tripoding and in severe respiratory distress and was hypoxic to 67% on RA. Initially placed on CPAP with improvement in sats but remained in distress and was intubated. Of note, pt had several admissions for volume overload in setting of missed HD and at that time GOC took place and pt DNR/DNI, no feeding tube and no further HD. In ED pt was hypertensive to 220s/120s. Admitted to ICU for further mgmt. Pt remains DNR/DNI, no escalation of care, no HD.    #Neuro - pt comfortable on propofol, she is responsive on this dose; will maintain this dosing until discuss further plans for possible terminal extubation with family  #CV - pt is hypertensive to 180s-200s; would not aggressively treat since pt is baseline HTN and do not want to drop BP too quickly  #Pulm - remains intubated, will not plan to wean yet until decision made by family about ultimate plan for respiratory support  #ID- was started on cefepime, but since family wants no escalation of care, will d/c as this is not likely to make difference in overall prognosis  #Renal/metabolic - known ESRD, no longer getting HD and no plans for further HD  #GI- family requesting no NGT so feeds on hold  #Heme - no active issues  #Endo - no active issues  #PPx - SCDs, family requesting no HSQ  #Dispo- remains in ICU while on ventilator; DNR/DNI, no escalation of care, no artificial nutrition; awaiting family arrival to discuss plans for possible palliative wean

## 2022-08-17 NOTE — PROGRESS NOTE ADULT - SUBJECTIVE AND OBJECTIVE BOX
CHIEF COMPLAINT:    Interval Events:    REVIEW OF SYSTEMS:  Constitutional: [ ] fevers [ ] chills [ ] weight loss [ ] weight gain  HEENT: [ ] dry eyes [ ] eye irritation [ ] postnasal drip [ ] nasal congestion  CV: [ ] chest pain [ ] orthopnea [ ] palpitations [ ] murmur  Resp: [ ] cough [ ] shortness of breath [ ] dyspnea [ ] wheezing [ ] sputum [ ] hemoptysis  GI: [ ] nausea [ ] vomiting [ ] diarrhea [ ] constipation [ ] abd pain [ ] dysphagia   : [ ] dysuria [ ] nocturia [ ] hematuria [ ] increased urinary frequency  Musculoskeletal: [ ] back pain [ ] myalgias [ ] arthralgias [ ] fracture  Skin: [ ] rash [ ] itch  Neurological: [ ] headache [ ] dizziness [ ] syncope [ ] weakness [ ] numbness  Hematologic/Lymphatic: [ ] anemia [ ] bleeding problem  Allergic/Immunologic: [ ] itchy eyes [ ] nasal discharge [ ] hives [ ] angioedema  [ ] All other systems negative  [ ] Unable to assess ROS because ________    OBJECTIVE:  ICU Vital Signs Last 24 Hrs  T(C): 36.5 (17 Aug 2022 07:30), Max: 36.5 (17 Aug 2022 07:30)  T(F): 97.7 (17 Aug 2022 07:30), Max: 97.7 (17 Aug 2022 07:30)  HR: 74 (17 Aug 2022 07:00) (61 - 116)  BP: 172/125 (17 Aug 2022 05:35) (144/102 - 225/128)  BP(mean): 132 (17 Aug 2022 05:35) (110 - 204)  ABP: --  ABP(mean): --  RR: 20 (17 Aug 2022 07:00) (17 - 45)  SpO2: 100% (17 Aug 2022 07:00) (87% - 100%)    O2 Parameters below as of 17 Aug 2022 07:30  Patient On (Oxygen Delivery Method): ventilator,20/400/70%/+5          Mode: AC/ CMV (Assist Control/ Continuous Mandatory Ventilation), RR (machine): 20, TV (machine): 400, FiO2: 70, PEEP: 5, ITime: 1, MAP: 10, PIP: 24    08-16 @ 07:01  -  08-17 @ 07:00  --------------------------------------------------------  IN: 172.6 mL / OUT: 0 mL / NET: 172.6 mL      CAPILLARY BLOOD GLUCOSE          PHYSICAL EXAM:  General:   HEENT:   Neck:   Respiratory:   Cardiovascular:   Abdomen:   Extremities:   Skin:   Neurological:  Psychiatry:    LINES:    HOSPITAL MEDICATIONS:  MEDICATIONS  (STANDING):  cefepime   IVPB 1000 milliGRAM(s) IV Intermittent every 24 hours  chlorhexidine 0.12% Liquid 15 milliLiter(s) Oral Mucosa every 12 hours  chlorhexidine 2% Cloths 1 Application(s) Topical <User Schedule>  hydrALAZINE Injectable 10 milliGRAM(s) IV Push every 8 hours  propofol Infusion 5.004 MICROgram(s)/kG/Min (2.33 mL/Hr) IV Continuous <Continuous>    MEDICATIONS  (PRN):  fentaNYL    Injectable 50 MICROGram(s) IV Push every 4 hours PRN pain, discomfort, agitation      LABS:                        9.8    13.20 )-----------( 248      ( 16 Aug 2022 15:50 )             29.7     Hgb Trend: 9.8<--  08-16    133<L>  |  101  |  78<H>  ----------------------------<  140<H>  5.9<H>   |  25  |  14.60<H>    Ca    9.0      16 Aug 2022 16:49    TPro  8.4<H>  /  Alb  3.2<L>  /  TBili  0.4  /  DBili  x   /  AST  34  /  ALT  22  /  AlkPhos  71  08-16    PT/INR - ( 16 Aug 2022 15:50 )   PT: 11.5 sec;   INR: 0.97 ratio         PTT - ( 16 Aug 2022 15:50 )  PTT:38.7 sec    Arterial Blood Gas:  08-16 @ 16:08  7.25/59/182/26/99.6/-2.4  ABG lactate: --        MICROBIOLOGY:     RADIOLOGY:  [ ] Reviewed and interpreted by me CHIEF COMPLAINT:    Interval Events:  no o/n events    REVIEW OF SYSTEMS:  [ x] Unable to assess ROS because intubated/sedated    OBJECTIVE:  ICU Vital Signs Last 24 Hrs  T(C): 36.5 (17 Aug 2022 07:30), Max: 36.5 (17 Aug 2022 07:30)  T(F): 97.7 (17 Aug 2022 07:30), Max: 97.7 (17 Aug 2022 07:30)  HR: 74 (17 Aug 2022 07:00) (61 - 116)  BP: 172/125 (17 Aug 2022 05:35) (144/102 - 225/128)  BP(mean): 132 (17 Aug 2022 05:35) (110 - 204)  ABP: --  ABP(mean): --  RR: 20 (17 Aug 2022 07:00) (17 - 45)  SpO2: 100% (17 Aug 2022 07:00) (87% - 100%)    O2 Parameters below as of 17 Aug 2022 07:30  Patient On (Oxygen Delivery Method): ventilator,20/400/70%/+5          Mode: AC/ CMV (Assist Control/ Continuous Mandatory Ventilation), RR (machine): 20, TV (machine): 400, FiO2: 70, PEEP: 5, ITime: 1, MAP: 10, PIP: 24    08-16 @ 07:01  -  08-17 @ 07:00  --------------------------------------------------------  IN: 172.6 mL / OUT: 0 mL / NET: 172.6 mL      CAPILLARY BLOOD GLUCOSE          PHYSICAL EXAM:  General: NAD, non toxic appearing  HEENT: ETT in place  Neck: supple  Respiratory: poor inspiratory effort   Cardiovascular: s1s2 RRR  Abdomen: soft, non tender, non distended  Extremities: warm, trace pitting edema b/l  Skin: L chest skin tear; L AVF  Neurological: responds to simple questions, moves all extremities  Psychiatry: unable to assess    LINES:  [facility]    HOSPITAL MEDICATIONS:  MEDICATIONS  (STANDING):  cefepime   IVPB 1000 milliGRAM(s) IV Intermittent every 24 hours  chlorhexidine 0.12% Liquid 15 milliLiter(s) Oral Mucosa every 12 hours  chlorhexidine 2% Cloths 1 Application(s) Topical <User Schedule>  hydrALAZINE Injectable 10 milliGRAM(s) IV Push every 8 hours  propofol Infusion 5.004 MICROgram(s)/kG/Min (2.33 mL/Hr) IV Continuous <Continuous>    MEDICATIONS  (PRN):  fentaNYL    Injectable 50 MICROGram(s) IV Push every 4 hours PRN pain, discomfort, agitation      LABS:                        9.8    13.20 )-----------( 248      ( 16 Aug 2022 15:50 )             29.7     Hgb Trend: 9.8<--  08-16    133<L>  |  101  |  78<H>  ----------------------------<  140<H>  5.9<H>   |  25  |  14.60<H>    Ca    9.0      16 Aug 2022 16:49    TPro  8.4<H>  /  Alb  3.2<L>  /  TBili  0.4  /  DBili  x   /  AST  34  /  ALT  22  /  AlkPhos  71  08-16    PT/INR - ( 16 Aug 2022 15:50 )   PT: 11.5 sec;   INR: 0.97 ratio         PTT - ( 16 Aug 2022 15:50 )  PTT:38.7 sec    Arterial Blood Gas:  08-16 @ 16:08  7.25/59/182/26/99.6/-2.4  ABG lactate: --        MICROBIOLOGY:     RADIOLOGY:  [ ] Reviewed and interpreted by me

## 2022-08-17 NOTE — PROGRESS NOTE ADULT - SUBJECTIVE AND OBJECTIVE BOX
[facility] NEPHROLOGY SERVICES, [facility]  NEPHROLOGY AND HYPERTENSION  [address]  [address]  [place] [address]  [phone]    MD MATTHEW PATIÑO, MD NAYAN HOUSE, MD CARLITO PAYNE, MD RON BRANCH, MD COLBY RODRIGUEZ MD          Patient events noted  intubated         MEDICATIONS  (STANDING):  chlorhexidine 2% Cloths 1 Application(s) Topical <User Schedule>  fentaNYL   Infusion 0.5 MICROgram(s)/kG/Hr (3.6 mL/Hr) IV Continuous <Continuous>  hydrALAZINE Injectable 10 milliGRAM(s) IV Push every 8 hours    MEDICATIONS  (PRN):  fentaNYL    Injectable 50 MICROGram(s) IV Push every 4 hours PRN pain, discomfort, agitation  midazolam Injectable 2 milliGRAM(s) IV Push every 4 hours PRN comfort care/ dyspnea      08-16-22 @ 07:01  -  08-17-22 @ 07:00  --------------------------------------------------------  IN: 172.6 mL / OUT: 0 mL / NET: 172.6 mL    08-17-22 @ 07:01  -  08-17-22 @ 19:29  --------------------------------------------------------  IN: 223 mL / OUT: 0 mL / NET: 223 mL      PHYSICAL EXAM:      T(C): 36.7 (08-17-22 @ 19:21), Max: 37.4 (08-17-22 @ 11:40)  HR: 97 (08-17-22 @ 16:49) (61 - 100)  BP: 179/85 (08-17-22 @ 16:00) (144/102 - 221/197)  RR: 21 (08-17-22 @ 16:49) (17 - 24)  SpO2: 95% (08-17-22 @ 16:49) (95% - 100%)  Wt(kg): --  Lungs clear  Heart S1S2  Abd soft NT ND  Extremities:   tr edema                                    9.8    13.20 )-----------( 248      ( 16 Aug 2022 15:50 )             29.7     08-16    133<L>  |  101  |  78<H>  ----------------------------<  140<H>  5.9<H>   |  25  |  14.60<H>    Ca    9.0      16 Aug 2022 16:49    TPro  8.4<H>  /  Alb  3.2<L>  /  TBili  0.4  /  DBili  x   /  AST  34  /  ALT  22  /  AlkPhos  71  08-16    ABG - ( 16 Aug 2022 16:08 )  pH, Arterial: 7.25  pH, Blood: x     /  pCO2: 59    /  pO2: 182   / HCO3: 26    / Base Excess: -2.4  /  SaO2: 99.6              LIVER FUNCTIONS - ( 16 Aug 2022 16:49 )  Alb: 3.2 g/dL / Pro: 8.4 gm/dL / ALK PHOS: 71 U/L / ALT: 22 U/L / AST: 34 U/L / GGT: x           Creatinine Trend: 14.60<--, 7.47<--, 4.59<--, 5.20<--, 7.88<--, 10.60<--  Mode: AC/ CMV (Assist Control/ Continuous Mandatory Ventilation)  RR (machine): 20  TV (machine): 400  FiO2: 50  PEEP: 5  ITime: 1  MAP: 9  PIP: 20      Assessment   ESRD; nonadherent to HD    Plan:  Family wishes withdrawal of HD support  Supportive care      Harry Osborn MD

## 2022-08-18 DIAGNOSIS — R53.81 OTHER MALAISE: ICD-10-CM

## 2022-08-18 DIAGNOSIS — I50.9 HEART FAILURE, UNSPECIFIED: ICD-10-CM

## 2022-08-18 DIAGNOSIS — N18.6 END STAGE RENAL DISEASE: ICD-10-CM

## 2022-08-18 DIAGNOSIS — R62.7 ADULT FAILURE TO THRIVE: ICD-10-CM

## 2022-08-18 DIAGNOSIS — Z51.5 ENCOUNTER FOR PALLIATIVE CARE: ICD-10-CM

## 2022-08-18 DIAGNOSIS — J96.90 RESPIRATORY FAILURE, UNSPECIFIED, UNSPECIFIED WHETHER WITH HYPOXIA OR HYPERCAPNIA: ICD-10-CM

## 2022-08-18 PROCEDURE — 99233 SBSQ HOSP IP/OBS HIGH 50: CPT

## 2022-08-18 PROCEDURE — 99223 1ST HOSP IP/OBS HIGH 75: CPT

## 2022-08-18 RX ORDER — SCOPALAMINE 1 MG/3D
1 PATCH, EXTENDED RELEASE TRANSDERMAL
Refills: 0 | Status: DISCONTINUED | OUTPATIENT
Start: 2022-08-18 | End: 2022-08-20

## 2022-08-18 RX ORDER — HYDROMORPHONE HYDROCHLORIDE 2 MG/ML
1 INJECTION INTRAMUSCULAR; INTRAVENOUS; SUBCUTANEOUS
Refills: 0 | Status: DISCONTINUED | OUTPATIENT
Start: 2022-08-18 | End: 2022-08-20

## 2022-08-18 RX ORDER — HYDROMORPHONE HYDROCHLORIDE 2 MG/ML
2 INJECTION INTRAMUSCULAR; INTRAVENOUS; SUBCUTANEOUS
Refills: 0 | Status: DISCONTINUED | OUTPATIENT
Start: 2022-08-18 | End: 2022-08-20

## 2022-08-18 RX ORDER — SODIUM BICARBONATE 1 MEQ/ML
50 SYRINGE (ML) INTRAVENOUS ONCE
Refills: 0 | Status: DISCONTINUED | OUTPATIENT
Start: 2022-08-18 | End: 2022-08-18

## 2022-08-18 RX ADMIN — Medication 10 MILLIGRAM(S): at 05:56

## 2022-08-18 RX ADMIN — FENTANYL CITRATE 3.6 MICROGRAM(S)/KG/HR: 50 INJECTION INTRAVENOUS at 08:38

## 2022-08-18 RX ADMIN — SCOPALAMINE 1 PATCH: 1 PATCH, EXTENDED RELEASE TRANSDERMAL at 11:33

## 2022-08-18 RX ADMIN — HYDROMORPHONE HYDROCHLORIDE 2 MILLIGRAM(S): 2 INJECTION INTRAMUSCULAR; INTRAVENOUS; SUBCUTANEOUS at 18:49

## 2022-08-18 RX ADMIN — HYDROMORPHONE HYDROCHLORIDE 2 MILLIGRAM(S): 2 INJECTION INTRAMUSCULAR; INTRAVENOUS; SUBCUTANEOUS at 18:25

## 2022-08-18 RX ADMIN — HYDROMORPHONE HYDROCHLORIDE 2 MILLIGRAM(S): 2 INJECTION INTRAMUSCULAR; INTRAVENOUS; SUBCUTANEOUS at 16:15

## 2022-08-18 RX ADMIN — HYDROMORPHONE HYDROCHLORIDE 2 MILLIGRAM(S): 2 INJECTION INTRAMUSCULAR; INTRAVENOUS; SUBCUTANEOUS at 11:33

## 2022-08-18 RX ADMIN — SCOPALAMINE 1 PATCH: 1 PATCH, EXTENDED RELEASE TRANSDERMAL at 19:00

## 2022-08-18 RX ADMIN — ROBINUL 0.2 MILLIGRAM(S): 0.2 INJECTION INTRAMUSCULAR; INTRAVENOUS at 11:42

## 2022-08-18 RX ADMIN — HYDROMORPHONE HYDROCHLORIDE 2 MILLIGRAM(S): 2 INJECTION INTRAMUSCULAR; INTRAVENOUS; SUBCUTANEOUS at 15:26

## 2022-08-18 RX ADMIN — CHLORHEXIDINE GLUCONATE 1 APPLICATION(S): 213 SOLUTION TOPICAL at 05:00

## 2022-08-18 RX ADMIN — HYDROMORPHONE HYDROCHLORIDE 2 MILLIGRAM(S): 2 INJECTION INTRAMUSCULAR; INTRAVENOUS; SUBCUTANEOUS at 12:30

## 2022-08-18 NOTE — PROGRESS NOTE ADULT - ASSESSMENT
77F w/ HTN, HLD, mod-severe MR, grade 2 diatsolic dysfunction, ESRD on HD, chronic hypoxic respiratory failure on home O2, dementia. Presented w/ SOB and respiratory distress. In ED, pt was tripoding and in severe respiratory distress and was hypoxic to 67% on RA. Initially placed on CPAP with improvement in sats but remained in distress and was intubated. Of note, pt had several admissions for volume overload in setting of missed HD and at that time GOC took place and pt DNR/DNI, no feeding tube and no further HD. In ED pt was hypertensive to 220s/120s. Admitted to ICU for further mgmt. Pt remains DNR/DNI, no escalation of care, no HD. Pt liberated from ventilator yesterday, on fentanyl drip.     Would transition to dilaudid IVP ATC w/ PRN dosing and d/c fentanyl gtt. Add scopolamine patch for secretions and continue w/ glycopyrrolate. Hospice referral. NP discussed w/ family.

## 2022-08-18 NOTE — CONSULT NOTE ADULT - PROBLEM SELECTOR RECOMMENDATION 2
multiple admissions related to not going for HD in the community   actively dying multiple admissions related to non- adherence to HD regimen in the community    4th admission since June 2022 at [facility]  actively dying

## 2022-08-18 NOTE — PROGRESS NOTE ADULT - SUBJECTIVE AND OBJECTIVE BOX
CHIEF COMPLAINT:    Interval Events:    REVIEW OF SYSTEMS:  Constitutional: [ ] fevers [ ] chills [ ] weight loss [ ] weight gain  HEENT: [ ] dry eyes [ ] eye irritation [ ] postnasal drip [ ] nasal congestion  CV: [ ] chest pain [ ] orthopnea [ ] palpitations [ ] murmur  Resp: [ ] cough [ ] shortness of breath [ ] dyspnea [ ] wheezing [ ] sputum [ ] hemoptysis  GI: [ ] nausea [ ] vomiting [ ] diarrhea [ ] constipation [ ] abd pain [ ] dysphagia   : [ ] dysuria [ ] nocturia [ ] hematuria [ ] increased urinary frequency  Musculoskeletal: [ ] back pain [ ] myalgias [ ] arthralgias [ ] fracture  Skin: [ ] rash [ ] itch  Neurological: [ ] headache [ ] dizziness [ ] syncope [ ] weakness [ ] numbness  Hematologic/Lymphatic: [ ] anemia [ ] bleeding problem  Allergic/Immunologic: [ ] itchy eyes [ ] nasal discharge [ ] hives [ ] angioedema  [ ] All other systems negative  [ ] Unable to assess ROS because ________    OBJECTIVE:  ICU Vital Signs Last 24 Hrs  T(C): 36.6 (18 Aug 2022 05:48), Max: 37.4 (17 Aug 2022 11:40)  T(F): 97.9 (18 Aug 2022 05:48), Max: 99.4 (17 Aug 2022 16:18)  HR: 104 (18 Aug 2022 04:00) (76 - 118)  BP: 185/85 (18 Aug 2022 04:00) (179/85 - 216/104)  BP(mean): 110 (18 Aug 2022 04:00) (108 - 133)  ABP: --  ABP(mean): --  RR: 5 (18 Aug 2022 04:00) (5 - 21)  SpO2: 94% (18 Aug 2022 04:00) (93% - 100%)    O2 Parameters below as of 17 Aug 2022 17:05    O2 Flow (L/min): 2  O2 Concentration (%): 28      Mode: AC/ CMV (Assist Control/ Continuous Mandatory Ventilation), RR (machine): 20, TV (machine): 400, FiO2: 50, PEEP: 5, ITime: 1, MAP: 9, PIP: 20    08-17 @ 07:01  -  08-18 @ 07:00  --------------------------------------------------------  IN: 338.1 mL / OUT: 0 mL / NET: 338.1 mL      CAPILLARY BLOOD GLUCOSE          PHYSICAL EXAM:  General:   HEENT:   Neck:   Respiratory:   Cardiovascular:   Abdomen:   Extremities:   Skin:   Neurological:  Psychiatry:    LINES:    HOSPITAL MEDICATIONS:  MEDICATIONS  (STANDING):  chlorhexidine 2% Cloths 1 Application(s) Topical <User Schedule>  fentaNYL   Infusion 0.5 MICROgram(s)/kG/Hr (3.6 mL/Hr) IV Continuous <Continuous>  hydrALAZINE Injectable 10 milliGRAM(s) IV Push every 8 hours  sodium bicarbonate  Injectable 50 milliLiter(s) IV Push once    MEDICATIONS  (PRN):  fentaNYL    Injectable 50 MICROGram(s) IV Push every 4 hours PRN pain, discomfort, agitation  glycopyrrolate Injectable 0.2 milliGRAM(s) IV Push every 8 hours PRN secretions  midazolam Injectable 2 milliGRAM(s) IV Push every 4 hours PRN comfort care/ dyspnea      LABS:                        9.8    13.20 )-----------( 248      ( 16 Aug 2022 15:50 )             29.7     Hgb Trend: 9.8<--  08-16    133<L>  |  101  |  78<H>  ----------------------------<  140<H>  5.9<H>   |  25  |  14.60<H>    Ca    9.0      16 Aug 2022 16:49    TPro  8.4<H>  /  Alb  3.2<L>  /  TBili  0.4  /  DBili  x   /  AST  34  /  ALT  22  /  AlkPhos  71  08-16    PT/INR - ( 16 Aug 2022 15:50 )   PT: 11.5 sec;   INR: 0.97 ratio         PTT - ( 16 Aug 2022 15:50 )  PTT:38.7 sec    Arterial Blood Gas:  08-16 @ 16:08  7.25/59/182/26/99.6/-2.4  ABG lactate: --        MICROBIOLOGY:     RADIOLOGY:  [ ] Reviewed and interpreted by me CHIEF COMPLAINT:    Interval Events:  palliative extubation yesterday   on fentanyl gtt  pt wakes up intermittently, states she is not in pain    REVIEW OF SYSTEMS:  [ x] Unable to assess ROS because encephalopathy    OBJECTIVE:  ICU Vital Signs Last 24 Hrs  T(C): 36.6 (18 Aug 2022 05:48), Max: 37.4 (17 Aug 2022 11:40)  T(F): 97.9 (18 Aug 2022 05:48), Max: 99.4 (17 Aug 2022 16:18)  HR: 104 (18 Aug 2022 04:00) (76 - 118)  BP: 185/85 (18 Aug 2022 04:00) (179/85 - 216/104)  BP(mean): 110 (18 Aug 2022 04:00) (108 - 133)  ABP: --  ABP(mean): --  RR: 5 (18 Aug 2022 04:00) (5 - 21)  SpO2: 94% (18 Aug 2022 04:00) (93% - 100%)    O2 Parameters below as of 17 Aug 2022 17:05    O2 Flow (L/min): 2  O2 Concentration (%): 28      Mode: AC/ CMV (Assist Control/ Continuous Mandatory Ventilation), RR (machine): 20, TV (machine): 400, FiO2: 50, PEEP: 5, ITime: 1, MAP: 9, PIP: 20    08-17 @ 07:01  -  08-18 @ 07:00  --------------------------------------------------------  IN: 338.1 mL / OUT: 0 mL / NET: 338.1 mL      CAPILLARY BLOOD GLUCOSE          PHYSICAL EXAM:  General: NAD, non toxic appearing  HEENT: dry MM  Neck: supple  Respiratory: poor inspiratory effort   Cardiovascular: s1s2 RRR  Abdomen: soft, non tender, non distended  Extremities: warm, trace pitting edema b/l  Skin: L chest skin tear; L AVF  Neurological: responds to simple questions, moves all extremities    LINES:  [facility]      HOSPITAL MEDICATIONS:  MEDICATIONS  (STANDING):  chlorhexidine 2% Cloths 1 Application(s) Topical <User Schedule>  fentaNYL   Infusion 0.5 MICROgram(s)/kG/Hr (3.6 mL/Hr) IV Continuous <Continuous>  hydrALAZINE Injectable 10 milliGRAM(s) IV Push every 8 hours  sodium bicarbonate  Injectable 50 milliLiter(s) IV Push once    MEDICATIONS  (PRN):  fentaNYL    Injectable 50 MICROGram(s) IV Push every 4 hours PRN pain, discomfort, agitation  glycopyrrolate Injectable 0.2 milliGRAM(s) IV Push every 8 hours PRN secretions  midazolam Injectable 2 milliGRAM(s) IV Push every 4 hours PRN comfort care/ dyspnea      LABS:                        9.8    13.20 )-----------( 248      ( 16 Aug 2022 15:50 )             29.7     Hgb Trend: 9.8<--  08-16    133<L>  |  101  |  78<H>  ----------------------------<  140<H>  5.9<H>   |  25  |  14.60<H>    Ca    9.0      16 Aug 2022 16:49    TPro  8.4<H>  /  Alb  3.2<L>  /  TBili  0.4  /  DBili  x   /  AST  34  /  ALT  22  /  AlkPhos  71  08-16    PT/INR - ( 16 Aug 2022 15:50 )   PT: 11.5 sec;   INR: 0.97 ratio         PTT - ( 16 Aug 2022 15:50 )  PTT:38.7 sec    Arterial Blood Gas:  08-16 @ 16:08  7.25/59/182/26/99.6/-2.4  ABG lactate: --        MICROBIOLOGY:     RADIOLOGY:  [ ] Reviewed and interpreted by me

## 2022-08-18 NOTE — CONSULT NOTE ADULT - PROBLEM SELECTOR RECOMMENDATION 3
has been admitted multiple times Has been admitted for not adhering to HD schedule in the community     comfort measures

## 2022-08-18 NOTE — CONSULT NOTE ADULT - PROBLEM SELECTOR RECOMMENDATION 9
on home oxygen via nasal cannula at baseline  was intubated for hypoxic respiratory failure  palliative extubation yesterday  has Dilaudid ATC and PRN   glycopyrrolate  and scopolamine patch for excessive secretions on home oxygen via nasal cannula at baseline  was intubated for hypoxic respiratory failure  palliative extubation yesterday  has Dilaudid IVP ATC and PRN   glycopyrrolate  and scopolamine patch for excessive secretions

## 2022-08-18 NOTE — PHARMACOTHERAPY INTERVENTION NOTE - COMMENTS
Transition from fentanyl infusion to IV opioids for hospice. As discussed with team, based on fentanyl requirements, will initiate hydromorphone IVP (renal failure) dosed at 2 mG IVP every 3-4 hours around the clock with PRN breakthrough.

## 2022-08-18 NOTE — CONSULT NOTE ADULT - ASSESSMENT
77 year old female ESRD on HD, HF, HTN, mitral regurgitation and chronic home oxygen use presented to the ED for respiratory failure with hypoxia.  Patient has had multiple recent hospitalizations related to her not getting HD in the community.  Patient was most recently discharged on 8/8 and had apparently not had HD at her outpatient facility.  Patient was intubated for hypoxic respiratory failure.  Family electng for comfort measures and made decision to palliatively extubate patient yesterday and comfort measures ongoing.  Palliative care consulted for assistance with hospice.   77 year old female ESRD on HD, HF, HTN, mitral regurgitation and chronic home oxygen use presented to the ED for respiratory failure with hypoxia.  Patient has had multiple recent hospitalizations related to HD non- adherence  Patient was most recently discharged on 8/8 and had apparently not had HD at her outpatient facility.  Patient was intubated for hypoxic respiratory failure.  Family electing for comfort measures and made decision to palliatively extubate patient yesterday.  Palliative care consulted for assistance with hospice.

## 2022-08-18 NOTE — CONSULT NOTE ADULT - SUBJECTIVE AND OBJECTIVE BOX
HPI:  77F PMH HTN, HLD, moderate-severe mitral regurgitation, grade II diastolic dysfunction, ESRD on HD (with non compliance), chronic hypoxic respiratory failure on home O2 3LNC, chronic anemia, dementia, depression, glaucoma presents for SOB, respiratory distress. Found to be tripoding, unable to complete full sentences, with O2 sat 67% on RA and 85% on NRB. Placed on CPAP with sat improved to the 90s. Intubated in ED for respiratory distress. Pt was recently admitted 7/13-7/22 and 8/3 -8/8 for SOB secondary to volume overload from missed HD sessions. Goals of care discussion during prior admissions with pt and family deciding on DNR/DNI, no feeding tubes with MOLST filled out. Hospice had initially been discussed with pt/family as pt was refusing further HD sessions however later changed her mind and refused hospice and wanted to continue HD. Pt was discharged 8/8 on which day she received HD in the hospital. She has not gone for further HD sessions since discharge and now returns with SOB and respiratory distress. granddaughter reports pt to have cough with clear/white secretions. Granddaughter who is at bedside would like HD to continue however pt's daughter Socorro is next of kin and decision maker.     Pt's prior code status was not known in ED at time of evaluation and pt was intubated.   Hypertensive 225/128. Labs with WBC 13.2, K 5.9, BUN 78, Cr 14.6  ABG 7.25/59/182/26/100 (intubated on , RR 16, FIO2: 100%, PEEP 5) (16 Aug 2022 17:50)    PERTINENT PM/SXH:   HTN (hypertension)    ESRD on dialysis    Asthma    DM (diabetes mellitus)    Dyslipidemia    Noncompliance with treatment    Severe anemia    On home oxygen therapy      No significant past surgical history    History of cholecystectomy    History of left knee replacement      FAMILY HISTORY:    ITEMS NOT CHECKED ARE NOT PRESENT    SOCIAL HISTORY:   Significant other/partner:  [ ]  Children: yes  [ ]  Samaritan/Spirituality: Taoism  Substance hx:  [ ]   Tobacco hx:  [ ]   Alcohol hx: [ ]   Home Opioid hx:  [ ] I-Stop Reference No: Reference #: [id number]  Living Situation: [x ]Home  [ ]Long term care  [ ]Rehab [ ]Other    ADVANCE DIRECTIVES:    DNR  MOLST  [ ]  Living Will  [ ]   DECISION MAKER(s):  [ ] Health Care Proxy(s)  [x ] Surrogate(s)  [ ] Guardian           Name(s): Phone Number(s): Socorro Toro [phone]    BASELINE (I)ADL(s) (prior to admission):  [place]: [ ]Total  [ x] Moderate [ ]Dependent    Allergies    No Known Drug Allergies  shellfish (Swelling)    Intolerances    MEDICATIONS  (STANDING):  chlorhexidine 2% Cloths 1 Application(s) Topical <User Schedule>  hydrALAZINE Injectable 10 milliGRAM(s) IV Push every 8 hours  HYDROmorphone  Injectable 2 milliGRAM(s) IV Push every 3 hours  scopolamine 1 mG/72 Hr(s) Patch 1 Patch Transdermal every 72 hours    MEDICATIONS  (PRN):  glycopyrrolate Injectable 0.2 milliGRAM(s) IV Push every 8 hours PRN secretions  HYDROmorphone  Injectable 1 milliGRAM(s) IV Push every 2 hours PRN respirtoey distress    PRESENT SYMPTOMS: [x ]Unable to obtain due to poor mentation   Source if other than patient:  [ ]Family   [ ]Team     Pain: [ x] yes [ ] no  QOL impact -   Location -                    Aggravating factors -  Quality -  Radiation -  Timing-  Severity (0-10 scale):  Minimal acceptable level (0-10 scale):     PAIN AD Score: 3    [email or web address] (press ctrl +  left click to view)    Dyspnea:                           [ ]Mild [ ]Moderate [ ]Severe  Anxiety:                             [ ]Mild [ ]Moderate [ ]Severe  Fatigue:                             [ ]Mild [ ]Moderate [ ]Severe  Nausea:                             [ ]Mild [ ]Moderate [ ]Severe  Loss of appetite:              [ ]Mild [ ]Moderate [ ]Severe  Constipation:                    [ ]Mild [ ]Moderate [ ]Severe    Other Symptoms:  [ ]All other review of systems negative     Karnofsky Performance Score/Palliative Performance Status Version 2:        10 %    http://npcrc.org/files/news/palliative_performance_scale_[email or web address].pdf  PHYSICAL EXAM:  Vital Signs Last 24 Hrs  T(C): 36.9 (18 Aug 2022 11:15), Max: 37.4 (17 Aug 2022 16:18)  T(F): 98.5 (18 Aug 2022 11:15), Max: 99.4 (17 Aug 2022 16:18)  HR: 114 (18 Aug 2022 12:00) (76 - 118)  BP: 149/68 (18 Aug 2022 12:00) (149/68 - 214/105)  BP(mean): 89 (18 Aug 2022 12:00) (89 - 133)  RR: 18 (18 Aug 2022 12:00) (5 - 21)  SpO2: 96% (18 Aug 2022 12:00) (93% - 100%)    Parameters below as of 17 Aug 2022 17:05    O2 Flow (L/min): 2  O2 Concentration (%): 28 I&O's Summary    17 Aug 2022 07:01  -  18 Aug 2022 07:00  --------------------------------------------------------  IN: 395.6 mL / OUT: 0 mL / NET: 395.6 mL    18 Aug 2022 07:01  -  18 Aug 2022 14:13  --------------------------------------------------------  IN: 28.6 mL / OUT: 0 mL / NET: 28.6 mL    GENERAL:  [ ]Alert  [ ]Oriented x   [ x]Lethargic  [ ]Cachexia  [ ]Unarousable  [ ]Verbal  [ x]Non-Verbal nods head at times to simple questions   Behavioral:   [ ] Anxiety  [ ] Delirium [ ] Agitation [ ] Other  HEENT:  [ ]Normal   [ ]Dry mouth   [ ]ET Tube/Trach  [ ]Oral lesions  PULMONARY:   [ ]Clear [ ]Tachypnea  [x ]Audible excessive secretions   [ ]Rhonchi        [ ]Right [ ]Left [ ]Bilateral  [ ]Crackles        [ ]Right [ ]Left [ ]Bilateral  [ ]Wheezing     [ ]Right [ ]Left [ ]Bilateral  CARDIOVASCULAR:    [ ]Regular [ ]Irregular [ x]Tachy  [ ]Timmy [ ]Murmur [ ]Other  GASTROINTESTINAL:  [x ]Soft  [ ]Distended   [ ]+BS  [ ]Non tender [ ]Tender  [ ]PEG [ ]OGT/ NGT  Last BM: prior to admission GENITOURINARY:  [ ]Normal [ ] Incontinent   [x ]Oliguria/Anuria   [ ]Nielson  MUSCULOSKELETAL:   [ ]Normal   [ ]Weakness  [ x]Bed/Wheelchair bound [ ]Edema  NEUROLOGIC:   [ ]No focal deficits  [x ] Cognitive impairment  [ ] Dysphagia [ ]Dysarthria [ ] Paresis [ ]Other   SKIN:   [x ]Normal   [ ]Pressure ulcer(s)  [ ]Rash    CRITICAL CARE:  [ ] Shock Present  [ ]Septic [ ]Cardiogenic [ ]Neurologic [ ]Hypovolemic  [ ]  Vasopressors [ ]  Inotropes   [ ] Respiratory failure present [ ] mechanical ventilation [ ] non-invasive ventilatory support [ ] High flow  [ ] Acute  [ ] Chronic [ ] Hypoxic  [ ] Hypercarbic [ ] Other  [x ] Other organ failure     LABS:                        9.8    13.20 )-----------( 248      ( 16 Aug 2022 15:50 )             29.7   08-16    133<L>  |  101  |  78<H>  ----------------------------<  140<H>  5.9<H>   |  25  |  14.60<H>    Ca    9.0      16 Aug 2022 16:49    TPro  8.4<H>  /  Alb  3.2<L>  /  TBili  0.4  /  DBili  x   /  AST  34  /  ALT  22  /  AlkPhos  71  08-16  PT/INR - ( 16 Aug 2022 15:50 )   PT: 11.5 sec;   INR: 0.97 ratio         PTT - ( 16 Aug 2022 15:50 )  PTT:38.7 sec    Culture - Sputum . (08.16.22 @ 23:00)    Gram Stain:   Few polymorphonuclear leukocytes seen per low power field  Few Squamous epithelial cells seen per low power field  Few Gram positive cocci in pairs seen per oil power field  Rare Gram Negative Rods seen per oil power field  Few Gram Positive Rods seen per oil power field    Specimen Source: ET Tube ET Tube trap    Culture Results:   Normal Respiratory Roro present    Culture - Blood (08.16.22 @ 20:40)    Specimen Source: .Blood Blood-Peripheral    Culture Results:   No growth to date.    RADIOLOGY & ADDITIONAL STUDIES:   < from: Xray Chest 1 View- PORTABLE-Urgent (Xray Chest 1 View- PORTABLE-Urgent .) (08.16.22 @ 16:16) >  IMPRESSION: Rather low endotracheal tube. Consider some withdrawal.  Significant bilateral infiltrates right greater than left have increased   from prior.  --- End of Report ---  < end of copied text >    < from: TTE Echo Complete w/o Contrast w/ Doppler (08.03.22 @ 15:53) >  Summary:   1. Left ventricular ejection fraction, by visual estimation, is 55 to   60%.   2. Normal global left ventricular systolic function.   3. Severely enlarged left atrium.   4. Elevated left atrial and left ventricular end-diastolic pressures.   5. Spectral Doppler shows pseudonormal pattern of left ventricular   myocardial filling (Grade II diastolic dysfunction).   6. Normal right ventricular size and function.   7. Trivial pericardial effusion.   8. Thickening of the anterior and posterior mitral valve leaflets.   9. Moderate to severe mitral valve regurgitation. Mitral regurgitation   may be underestimated due to the direction of the regurgitant jet.  10. Mild to moderate pulmonic valve regurgitation.  11. Peak velocity 2.2m/s, mean gradient 10mmHg, [place] by continuity   1.84cm^2, consistent with mild aortic stenosis.  12. Estimated pulmonary artery systolic pressure is 55.6 mmHg assuming a   right atrial pressure of 8 mmHg, which is consistent with moderate   pulmonary hypertension.  13. Mobile intra-atrial septum.  < end of copied text >    PROTEIN CALORIE MALNUTRITION PRESENT: [ ] Yes [ ] No  [ ] PPSV2 < or = to 30% [ ] significant weight loss  [ ] poor nutritional intake [ ] catabolic state [ ] anasarca     Artificial Nutrition [ ]     REFERRALS:   [ ]Chaplaincy  [ ] Hospice  [ ]Child Life  [ ]Social Work  [ ]Case management [ ]Holistic Therapy     Goals of Care Document:

## 2022-08-18 NOTE — CONSULT NOTE ADULT - PROBLEM SELECTOR RECOMMENDATION 6
Spoke with patient's daughter, Socorro at the bedside.  She is aware hospice referral was placed.  Spoke with [facility] liaison, Rehana who said there were no beds at [facility] and discussed possibility of [facility] with Socorro, but she felt it was too far for her to travel.  If a bed opens at a closer facility, can consider transfer if appropriate.  Patient with DNR/I on MOLST in chart and comfort measures ongoing.      Discussed with ICU team

## 2022-08-19 LAB
CULTURE RESULTS: SIGNIFICANT CHANGE UP
GRAM STN FLD: SIGNIFICANT CHANGE UP
RAPID RVP RESULT: SIGNIFICANT CHANGE UP
SARS-COV-2 RNA SPEC QL NAA+PROBE: SIGNIFICANT CHANGE UP
SPECIMEN SOURCE: SIGNIFICANT CHANGE UP

## 2022-08-19 PROCEDURE — 99233 SBSQ HOSP IP/OBS HIGH 50: CPT

## 2022-08-19 RX ORDER — DIPHENHYDRAMINE HCL 50 MG
25 CAPSULE ORAL ONCE
Refills: 0 | Status: COMPLETED | OUTPATIENT
Start: 2022-08-19 | End: 2022-08-19

## 2022-08-19 RX ORDER — HYDRALAZINE HCL 50 MG
10 TABLET ORAL ONCE
Refills: 0 | Status: COMPLETED | OUTPATIENT
Start: 2022-08-19 | End: 2022-08-19

## 2022-08-19 RX ORDER — ACETAMINOPHEN 500 MG
1000 TABLET ORAL ONCE
Refills: 0 | Status: COMPLETED | OUTPATIENT
Start: 2022-08-19 | End: 2022-08-19

## 2022-08-19 RX ORDER — MIDAZOLAM HYDROCHLORIDE 1 MG/ML
2 INJECTION, SOLUTION INTRAMUSCULAR; INTRAVENOUS EVERY 4 HOURS
Refills: 0 | Status: DISCONTINUED | OUTPATIENT
Start: 2022-08-19 | End: 2022-08-20

## 2022-08-19 RX ADMIN — SCOPALAMINE 1 PATCH: 1 PATCH, EXTENDED RELEASE TRANSDERMAL at 07:34

## 2022-08-19 RX ADMIN — HYDROMORPHONE HYDROCHLORIDE 2 MILLIGRAM(S): 2 INJECTION INTRAMUSCULAR; INTRAVENOUS; SUBCUTANEOUS at 15:33

## 2022-08-19 RX ADMIN — ROBINUL 0.2 MILLIGRAM(S): 0.2 INJECTION INTRAMUSCULAR; INTRAVENOUS at 03:28

## 2022-08-19 RX ADMIN — SCOPALAMINE 1 PATCH: 1 PATCH, EXTENDED RELEASE TRANSDERMAL at 18:31

## 2022-08-19 RX ADMIN — Medication 400 MILLIGRAM(S): at 03:55

## 2022-08-19 RX ADMIN — HYDROMORPHONE HYDROCHLORIDE 2 MILLIGRAM(S): 2 INJECTION INTRAMUSCULAR; INTRAVENOUS; SUBCUTANEOUS at 03:58

## 2022-08-19 RX ADMIN — HYDROMORPHONE HYDROCHLORIDE 2 MILLIGRAM(S): 2 INJECTION INTRAMUSCULAR; INTRAVENOUS; SUBCUTANEOUS at 15:37

## 2022-08-19 RX ADMIN — Medication 1000 MILLIGRAM(S): at 04:25

## 2022-08-19 RX ADMIN — HYDROMORPHONE HYDROCHLORIDE 2 MILLIGRAM(S): 2 INJECTION INTRAMUSCULAR; INTRAVENOUS; SUBCUTANEOUS at 09:06

## 2022-08-19 RX ADMIN — HYDROMORPHONE HYDROCHLORIDE 2 MILLIGRAM(S): 2 INJECTION INTRAMUSCULAR; INTRAVENOUS; SUBCUTANEOUS at 06:05

## 2022-08-19 RX ADMIN — CHLORHEXIDINE GLUCONATE 1 APPLICATION(S): 213 SOLUTION TOPICAL at 03:30

## 2022-08-19 RX ADMIN — HYDROMORPHONE HYDROCHLORIDE 2 MILLIGRAM(S): 2 INJECTION INTRAMUSCULAR; INTRAVENOUS; SUBCUTANEOUS at 18:43

## 2022-08-19 RX ADMIN — HYDROMORPHONE HYDROCHLORIDE 2 MILLIGRAM(S): 2 INJECTION INTRAMUSCULAR; INTRAVENOUS; SUBCUTANEOUS at 12:32

## 2022-08-19 RX ADMIN — Medication 10 MILLIGRAM(S): at 04:35

## 2022-08-19 RX ADMIN — HYDROMORPHONE HYDROCHLORIDE 2 MILLIGRAM(S): 2 INJECTION INTRAMUSCULAR; INTRAVENOUS; SUBCUTANEOUS at 05:35

## 2022-08-19 RX ADMIN — HYDROMORPHONE HYDROCHLORIDE 2 MILLIGRAM(S): 2 INJECTION INTRAMUSCULAR; INTRAVENOUS; SUBCUTANEOUS at 21:56

## 2022-08-19 RX ADMIN — HYDROMORPHONE HYDROCHLORIDE 2 MILLIGRAM(S): 2 INJECTION INTRAMUSCULAR; INTRAVENOUS; SUBCUTANEOUS at 18:28

## 2022-08-19 RX ADMIN — HYDROMORPHONE HYDROCHLORIDE 2 MILLIGRAM(S): 2 INJECTION INTRAMUSCULAR; INTRAVENOUS; SUBCUTANEOUS at 12:35

## 2022-08-19 RX ADMIN — Medication 25 MILLIGRAM(S): at 11:34

## 2022-08-19 RX ADMIN — HYDROMORPHONE HYDROCHLORIDE 2 MILLIGRAM(S): 2 INJECTION INTRAMUSCULAR; INTRAVENOUS; SUBCUTANEOUS at 03:28

## 2022-08-19 NOTE — PROGRESS NOTE ADULT - ASSESSMENT
77F PMH HTN, HLD, moderate-severe mitral regurgitation, grade II diastolic dysfunction, ESRD on HD (with non compliance), chronic hypoxic respiratory failure on home O2 3LNC, chronic anemia, dementia, depression, glaucoma, prior admissions for SOB/respiratory distress and elevated BP presents for recurrent SOB and hypoxia in setting of missed HD sessions. Intubated in emergency room. Advanced directives addressed with family and pt DNR/DNI, no further dialysis per pt's known prior wishes. Comfort measures s/p palliative extubation 8/17    DX: acute on chronic hypoxic respiratory failure, acute hypercarbic respiratory failure, acute pulmonary edema, volume overload, ESRD on HD but non compliant, hyperkalemia, hypertensive urgency, acute on chronic diastolic dysfunction       - will add versed 2mg IVP q4 hours as needed for agitation/anxiety/restlessness  - cont with Dilaudid around the clock with prn doses for SOB, increased work of breathing, pain  - given benadryl today for noted pruritis after dilaudid  - currently lethargic but arousable  - prn hydralazine for SBP > 200  - cont scopolamine patch for secretions  - suction as needed  - no further HD  - comfort measures  - hospice evaluation  - stable for transfer to general medical floor pending hospice placement  - DNR/DNI

## 2022-08-19 NOTE — PROGRESS NOTE ADULT - SUBJECTIVE AND OBJECTIVE BOX
24 hr events:  hypertensive SBP 200s overnight s/p hydralazine 10mg IVPx1  noted to be itching and scratching after Dilaudid administration today  given benadryl 25mg IVP x1 for pruritis  lethargic but arousable      ## ROS:  [x ] unable to obtain due to lethargy      ## Labs:  no new labs      ## Medications:  HYDROmorphone  Injectable 2 milliGRAM(s) IV Push every 3 hours  HYDROmorphone  Injectable 1 milliGRAM(s) IV Push every 2 hours PRN  midazolam Injectable 2 milliGRAM(s) IV Push every 4 hours PRN  scopolamine 1 mG/72 Hr(s) Patch 1 Patch Transdermal every 72 hours      ## Vitals:  T(C): 37 (08-19-22 @ 11:53), Max: 38.2 (08-19-22 @ 03:48)  HR: 102 (08-19-22 @ 10:00) (92 - 119)  BP: 161/68 (08-19-22 @ 10:00) (155/65 - 220/104)  BP(mean): 90 (08-19-22 @ 10:00) (87 - 132)  RR: 12 (08-19-22 @ 10:00) (10 - 16)  SpO2: 96% (08-19-22 @ 10:00) (93% - 98%)      08-18 @ 07:01  -  08-19 @ 07:00  --------------------------------------------------------  IN: 128.6 mL / OUT: 0 mL / NET: 128.6 mL          ## P/E:  Gen: lying comfortably in bed in no apparent distress  HEENT: NC/AT, sclera white  Resp: bilateral coarse breath sounds, no wheeze  CVS: RRR  Abd: soft NT/ND +BS  Ext: no c/c/e, LUE AVF  Neuro: lethargic but opens eyes to name call or touch    CENTRAL LINE: [ ] YES [x ] NO  LOCATION:  DATE INSERTED:  REMOVE: [ ] YES [ ] NO      GANDHI: [x ] YES [ ] NO    DATE INSERTED:  REMOVE:  [ ] YES [x ] NO  - for comfort    A-LINE:  [ ] YES [x] NO  LOCATION:   DATE INSERTED:  REMOVE:  [ ] YES [ ] NO  EXPLAIN:    GLOBAL ISSUE/BEST PRACTICE:  Analgesia: dilaudid  Sedation: as needed  HOB elevation: yes  Stress ulcer prophylaxis: n/a  VTE prophylaxis: n/a  Oral Care: n/a  Glycemic control: n/a  Nutrition: npo    CODE STATUS: [ ] full code  [x ] DNR  [x ] DNI  [x ] MOLST  Goals of care discussion: [x ] yes  24 hr events:  hypertensive SBP 200s overnight s/p hydralazine 10mg IVPx1  noted to be itching and scratching after Dilaudid administration today  given benadryl 25mg IVP x1 for pruritis  lethargic but arousable      ## ROS:  [x ] unable to obtain due to lethargy      ## Labs:  no new labs      ## Medications:  HYDROmorphone  Injectable 2 milliGRAM(s) IV Push every 3 hours  HYDROmorphone  Injectable 1 milliGRAM(s) IV Push every 2 hours PRN  midazolam Injectable 2 milliGRAM(s) IV Push every 4 hours PRN  scopolamine 1 mG/72 Hr(s) Patch 1 Patch Transdermal every 72 hours      ## Vitals:  T(C): 37 (08-19-22 @ 11:53), Max: 38.2 (08-19-22 @ 03:48)  HR: 102 (08-19-22 @ 10:00) (92 - 119)  BP: 161/68 (08-19-22 @ 10:00) (155/65 - 220/104)  BP(mean): 90 (08-19-22 @ 10:00) (87 - 132)  RR: 12 (08-19-22 @ 10:00) (10 - 16)  SpO2: 96% (08-19-22 @ 10:00) (93% - 98%)      08-18 @ 07:01  -  08-19 @ 07:00  --------------------------------------------------------  IN: 128.6 mL / OUT: 0 mL / NET: 128.6 mL          ## P/E:  Gen: lying comfortably in bed in no apparent distress  HEENT: NC/AT, sclera white  Resp: bilateral coarse breath sounds, no wheeze  CVS: RRR  Abd: soft NT/ND +BS  Ext: no c/c/e, LUE AVF  Neuro: lethargic but opens eyes to name call or touch    CENTRAL LINE: [ ] YES [x ] NO  LOCATION:  DATE INSERTED:  REMOVE: [ ] YES [ ] NO      GANDHI: [ ] YES [x ] NO    DATE INSERTED:  REMOVE:  [ ] YES [x ] NO     A-LINE:  [ ] YES [x] NO  LOCATION:   DATE INSERTED:  REMOVE:  [ ] YES [ ] NO  EXPLAIN:    GLOBAL ISSUE/BEST PRACTICE:  Analgesia: dilaudid  Sedation: as needed  HOB elevation: yes  Stress ulcer prophylaxis: n/a  VTE prophylaxis: n/a  Oral Care: n/a  Glycemic control: n/a  Nutrition: npo    CODE STATUS: [ ] full code  [x ] DNR  [x ] DNI  [x ] MOLST  Goals of care discussion: [x ] yes

## 2022-08-19 NOTE — PROGRESS NOTE ADULT - TIME BILLING
multidisciplinary discussion and plan of care with ICU team, nursing, palliative care, and family
coordination of care, review of chart, discussion w/ consultants, family and RN

## 2022-08-19 NOTE — CHART NOTE - NSCHARTNOTEFT_GEN_A_CORE
EXAM:  [x] limited echo  [x] limited lung    INDICATION  [x] SOB  [x] hypoxia    FINDINGS  1. normal EF, RV < LV size, no pericardial effusion, IVC 2cm  2. diffuse bilateral B lines anteriorly - suggestive of pulmonary edema
77F PMH HTN, HLD, moderate-severe mitral regurgitation, grade II diastolic dysfunction, ESRD on HD (with non compliance), chronic hypoxic respiratory failure on home O2 3LNC, chronic anemia, dementia, depression, glaucoma presents for SOB, respiratory distress. Found to be tripoding, unable to complete full sentences, with O2 sat 67% on RA and 85% on NRB. Placed on CPAP with sat improved to the 90s. Intubated in ED for respiratory distress. Pt was recently admitted 7/13-7/22 and 8/3 -8/8 for SOB secondary to volume overload from missed HD sessions. Goals of care discussion during prior admissions with pt and family deciding on DNR/DNI, no feeding tubes with MOLST filled out. Hospice had initially been discussed with pt/family as pt was refusing further HD sessions however later changed her mind and refused hospice and wanted to continue HD. Pt was discharged 8/8 on which day she received HD in the hospital. She has not gone for further HD sessions since discharge and now returns with SOB and respiratory distress. granddaughter reports pt to have cough with clear/white secretions. Granddaughter who is at bedside would like HD to continue however pt's daughter Socorro is next of kin and decision maker.  Pt's prior code status was not known in ED at time of evaluation and pt was intubated. Patient admitted to ICU with  acute on chronic hypoxic respiratory failure, acute hypercarbic respiratory failure, acute pulmonary edema, volume overload, ESRD on HD but non compliant, hyperkalemia, hypertensive urgency, acute on chronic diastolic dysfunction. Patients'  leukocytosis without fever - reactive vs infectious,  Initially started on  cefepime and 1 dose vanco to cover HCAP with recent hospitalizations and diffuse infiltrates on CXR though infiltrates can be due to fluid overload.. Since family decided no escalation of care, and this would not likely to make difference in overall prognosis poor abx were dc'd. Covid negative. Patient extubated on 8/18/22 [facility] discussion with Palliative resulted in MOLST completed  Pt remains DNR/DNI,  no feeding tubes, no escalation of care No HD. Hospice consult called and patient transitioned to Dilaudid IVP ATC w/ PRN dosing . Versed prn for comfort and anxiety. Added scopolamine patch for secretions given one dose of glycopyrrolate. Hospice referral made and discussed w/ family.   Patient seen and examined by ICU attending and stable for transfer to medicine service awaiting hospice.     Report given to Dr. Desouza and placed on Dr. Jacky Rodriguez service      Leslie Chang NP-C  critical care

## 2022-08-20 ENCOUNTER — TRANSCRIPTION ENCOUNTER (OUTPATIENT)
Age: 77
End: 2022-08-20

## 2022-08-20 VITALS
DIASTOLIC BLOOD PRESSURE: 79 MMHG | RESPIRATION RATE: 17 BRPM | TEMPERATURE: 100 F | HEART RATE: 106 BPM | OXYGEN SATURATION: 93 % | SYSTOLIC BLOOD PRESSURE: 173 MMHG

## 2022-08-20 PROCEDURE — 99239 HOSP IP/OBS DSCHRG MGMT >30: CPT

## 2022-08-20 RX ORDER — HYDROMORPHONE HYDROCHLORIDE 2 MG/ML
1 INJECTION INTRAMUSCULAR; INTRAVENOUS; SUBCUTANEOUS
Qty: 0 | Refills: 0 | DISCHARGE
Start: 2022-08-20

## 2022-08-20 RX ORDER — MIDAZOLAM HYDROCHLORIDE 1 MG/ML
1 INJECTION, SOLUTION INTRAMUSCULAR; INTRAVENOUS
Qty: 0 | Refills: 0 | DISCHARGE
Start: 2022-08-20

## 2022-08-20 RX ORDER — SCOPALAMINE 1 MG/3D
1 PATCH, EXTENDED RELEASE TRANSDERMAL
Qty: 0 | Refills: 0 | DISCHARGE
Start: 2022-08-20

## 2022-08-20 RX ADMIN — HYDROMORPHONE HYDROCHLORIDE 2 MILLIGRAM(S): 2 INJECTION INTRAMUSCULAR; INTRAVENOUS; SUBCUTANEOUS at 07:02

## 2022-08-20 RX ADMIN — HYDROMORPHONE HYDROCHLORIDE 2 MILLIGRAM(S): 2 INJECTION INTRAMUSCULAR; INTRAVENOUS; SUBCUTANEOUS at 03:03

## 2022-08-20 RX ADMIN — HYDROMORPHONE HYDROCHLORIDE 2 MILLIGRAM(S): 2 INJECTION INTRAMUSCULAR; INTRAVENOUS; SUBCUTANEOUS at 06:00

## 2022-08-20 RX ADMIN — HYDROMORPHONE HYDROCHLORIDE 2 MILLIGRAM(S): 2 INJECTION INTRAMUSCULAR; INTRAVENOUS; SUBCUTANEOUS at 09:50

## 2022-08-20 RX ADMIN — HYDROMORPHONE HYDROCHLORIDE 2 MILLIGRAM(S): 2 INJECTION INTRAMUSCULAR; INTRAVENOUS; SUBCUTANEOUS at 03:10

## 2022-08-20 RX ADMIN — HYDROMORPHONE HYDROCHLORIDE 2 MILLIGRAM(S): 2 INJECTION INTRAMUSCULAR; INTRAVENOUS; SUBCUTANEOUS at 09:11

## 2022-08-20 RX ADMIN — SCOPALAMINE 1 PATCH: 1 PATCH, EXTENDED RELEASE TRANSDERMAL at 07:03

## 2022-08-20 NOTE — DISCHARGE NOTE NURSING/CASE MANAGEMENT/SOCIAL WORK - NSDCPEFALRISK_GEN_ALL_CORE
For information on Fall & Injury Prevention, visit: [email or web address] OR  [email or web address] OR  https://www.cdc.gov/steadi/patient.html

## 2022-08-20 NOTE — DISCHARGE NOTE NURSING/CASE MANAGEMENT/SOCIAL WORK - PATIENT PORTAL LINK FT
You can access the FollowMyHealth Patient Portal offered by [facility] by registering at the following website: [email or web address]. By joining [facility]’s FollowMyHealth portal, you will also be able to view your health information using other applications (apps) compatible with our system.

## 2022-08-20 NOTE — PROGRESS NOTE ADULT - ASSESSMENT
77 F w/ history of HTN, HLD, moderate-severe mitral regurgitation, grade II diastolic dysfunction, ESRD on HD (with non compliance), chronic hypoxic respiratory failure on home O2 3LNC, chronic anemia, dementia, depression, glaucoma, prior admissions for SOB/respiratory distress and elevated BP presented w/ hyperkalemia, hypertensive urgency and acute on chronic hypoxic/hypercarbic respiratory failure due to acute pulmonary edema from volume overload due to missed HD w/ acute on chronic diastolic dysfunction. Pt was intubated in emergency room. Advanced directives addressed with family and pt was made DNR/DNI w/ plans for no further dialysis per pt's known prior wishes. Pt was put on comfort care w/ palliative extubation on 8/17.   - c/w Versed as needed for agitation/anxiety/restlessness  - cont with Dilaudid around the clock with prn doses for SOB, increased work of breathing, pain  - patient can also be given benadryl if she has further pruritis   - empiric antibiotics started in ICU to cover for bilateral infiltrates on CXR that may be HCAP were stopped by ICU team as patient is now hospice  - prn hydralazine for SBP > 200  - cont scopolamine patch for secretions  - suction as needed  - no further HD  - comfort measures  - hospice evaluation  - DNR/DNI    Pt pending discharge to [facility]. 77 F w/ history of HTN, HLD, moderate-severe mitral regurgitation, grade II diastolic dysfunction, ESRD on HD (with non compliance), chronic hypoxic respiratory failure on home O2 3LNC, chronic anemia, dementia, depression, glaucoma, prior admissions for SOB/respiratory distress and elevated BP presented w/ hyperkalemia, hypertensive urgency and acute on chronic hypoxic/hypercarbic respiratory failure due to acute pulmonary edema from volume overload due to missed HD w/ acute on chronic diastolic dysfunction. Pt was intubated in emergency room. Advanced directives addressed with family and pt was made DNR/DNI w/ plans for no further dialysis per pt's known prior wishes. Pt was put on comfort care w/ palliative extubation on 8/17.   - c/w Versed as needed for agitation/anxiety/restlessness  - cont with Dilaudid around the clock with prn doses for SOB, increased work of breathing, pain  - patient can also be given benadryl if she has further pruritis   - empiric antibiotics started in ICU to cover for bilateral infiltrates on CXR that may be HCAP were stopped by ICU team as patient is now hospice  - prn hydralazine for SBP > 200  - cont scopolamine patch for secretions  - suction as needed  - no further HD  - comfort measures  - hospice evaluation  - DNR/DNI    Pt pending discharge to [facility]. I spoke w/ the pt's daughter and granddaughter at bedside.

## 2022-08-20 NOTE — PROGRESS NOTE ADULT - REASON FOR ADMISSION
acute respiratory failure, intubated

## 2022-08-20 NOTE — DISCHARGE NOTE PROVIDER - NSDCMRMEDTOKEN_GEN_ALL_CORE_FT
albuterol 90 mcg/inh inhalation aerosol: 2 puff(s) inhaled every 6 hours, As needed, Shortness of Breath and/or Wheezing  HYDROmorphone: 1 milligram(s) intravenously every 2 hours, As Needed for pain  HYDROmorphone 100 mg/50 mL-D5% intravenous solution: 1 milliliter(s) intravenous every 3 hours  midazolam 2 mg/mL-NaCl 0.9% intravenous solution: 1 milliliter(s) intravenous every 4 hours, As needed, anxiety, discomfort  scopolamine: 1 patch transdermal every 72 hours

## 2022-08-20 NOTE — DISCHARGE NOTE PROVIDER - NSDCCPCAREPLAN_GEN_ALL_CORE_FT
PRINCIPAL DISCHARGE DIAGNOSIS  Diagnosis: Pulmonary edema  Assessment and Plan of Treatment:       SECONDARY DISCHARGE DIAGNOSES  Diagnosis: ESRD on dialysis  Assessment and Plan of Treatment:

## 2022-08-20 NOTE — PROGRESS NOTE ADULT - SUBJECTIVE AND OBJECTIVE BOX
Patient is a 77y old  Female who presents with a chief complaint of acute respiratory failure, intubated (19 Aug 2022 13:31)      INTERVAL HPI/OVERNIGHT EVENTS:    MEDICATIONS  (STANDING):  HYDROmorphone  Injectable 2 milliGRAM(s) IV Push every 3 hours  scopolamine 1 mG/72 Hr(s) Patch 1 Patch Transdermal every 72 hours    MEDICATIONS  (PRN):  HYDROmorphone  Injectable 1 milliGRAM(s) IV Push every 2 hours PRN respirtoey distress  midazolam Injectable 2 milliGRAM(s) IV Push every 4 hours PRN anxiety, discomfort      Allergies    No Known Drug Allergies  shellfish (Swelling)    Intolerances        Vital Signs Last 24 Hrs  T(C): 36.9 (20 Aug 2022 05:30), Max: 37 (19 Aug 2022 11:53)  T(F): 98.5 (20 Aug 2022 05:30), Max: 98.6 (19 Aug 2022 11:53)  HR: 109 (20 Aug 2022 05:30) (98 - 109)  BP: 178/76 (20 Aug 2022 05:30) (159/73 - 209/83)  BP(mean): 98 (19 Aug 2022 16:00) (90 - 115)  RR: 16 (20 Aug 2022 05:30) (7 - 16)  SpO2: 95% (20 Aug 2022 05:30) (94% - 97%)    Parameters below as of 19 Aug 2022 16:55  Patient On (Oxygen Delivery Method): room air        PHYSICAL EXAM:  GENERAL: NAD, well-groomed, well-developed  HEAD:  Atraumatic, Normocephalic  EYES: EOMI, PERRLA, conjunctiva and sclera clear  ENMT: No tonsillar erythema, exudates, or enlargement; Moist mucous membranes, Good dentition, No lesions  NECK: Supple, No JVD, Normal thyroid  NERVOUS SYSTEM:  Alert & Oriented X3, Good concentration; Motor Strength 5/5 B/L upper and lower extremities; DTRs 2+ intact and symmetric  CHEST/LUNG: Clear to auscultation bilaterally; No rales, rhonchi, wheezing, or rubs  HEART: Regular rate and rhythm; No murmurs, rubs, or gallops  ABDOMEN: Soft, Nontender, Nondistended; Bowel sounds present  EXTREMITIES:  2+ Peripheral Pulses, No clubbing, cyanosis, or edema  LYMPH: No lymphadenopathy noted  SKIN: No rashes or lesions    LABS:              CAPILLARY BLOOD GLUCOSE          Culture - Sputum (collected 16 Aug 2022 23:00)  Source: ET Tube ET Tube trap  Gram Stain (19 Aug 2022 10:02):    Few polymorphonuclear leukocytes seen per low power field    Few Squamous epithelial cells seen per low power field    Few Gram positive cocci in pairs seen per oil power field    Rare Gram Negative Rods seen per oil power field    Few Gram Positive Rods seen per oil power field  Final Report (19 Aug 2022 10:02):    Normal Respiratory Roro present    Culture - Blood (collected 16 Aug 2022 20:40)  Source: .Blood Blood-Peripheral  Preliminary Report (18 Aug 2022 10:01):    No growth to date.    Culture - Blood (collected 16 Aug 2022 20:15)  Source: .Blood Blood-Peripheral  Preliminary Report (18 Aug 2022 10:01):    No growth to date.      RADIOLOGY & ADDITIONAL TESTS:    08-19-22 @ 07:01  -  08-20-22 @ 07:00  --------------------------------------------------------  IN:  Total IN: 0 mL    OUT:    Voided (mL): 400 mL  Total OUT: 400 mL    Total NET: -400 mL       77 F w/ history of HTN, HLD, moderate-severe mitral regurgitation, grade II diastolic dysfunction, ESRD on HD (with non compliance), chronic hypoxic respiratory failure on home O2 3LNC, chronic anemia, dementia, depression, glaucoma, prior admissions for SOB/respiratory distress and elevated BP presented w/ hyperkalemia, hypertensive urgency and acute on chronic hypoxic/hypercarbic respiratory failure due to acute pulmonary edema from volume overload due to missed HD w/ acute on chronic diastolic dysfunction. Pt was intubated in emergency room. Advanced directives addressed with family and pt was made DNR/DNI w/ plans for no further dialysis per pt's known prior wishes. Pt was put on comfort care w/ palliative extubation on 8/17. She is lying in bed in NAD.    MEDICATIONS  (STANDING):  HYDROmorphone  Injectable 2 milliGRAM(s) IV Push every 3 hours  scopolamine 1 mG/72 Hr(s) Patch 1 Patch Transdermal every 72 hours    MEDICATIONS  (PRN):  HYDROmorphone  Injectable 1 milliGRAM(s) IV Push every 2 hours PRN respirtoey distress  midazolam Injectable 2 milliGRAM(s) IV Push every 4 hours PRN anxiety, discomfort      Allergies    No Known Drug Allergies  shellfish (Swelling)    Intolerances        Vital Signs Last 24 Hrs  T(C): 36.9 (20 Aug 2022 05:30), Max: 37 (19 Aug 2022 11:53)  T(F): 98.5 (20 Aug 2022 05:30), Max: 98.6 (19 Aug 2022 11:53)  HR: 109 (20 Aug 2022 05:30) (98 - 109)  BP: 178/76 (20 Aug 2022 05:30) (159/73 - 209/83)  BP(mean): 98 (19 Aug 2022 16:00) (90 - 115)  RR: 16 (20 Aug 2022 05:30) (7 - 16)  SpO2: 95% (20 Aug 2022 05:30) (94% - 97%)    Parameters below as of 19 Aug 2022 16:55  Patient On (Oxygen Delivery Method): room air        PHYSICAL EXAM:  GENERAL: NAD, well-groomed, well-developed  HEAD:  Atraumatic, Normocephalic   NECK: Supple   NERVOUS SYSTEM:  lethargic  CHEST/LUNG: Coarse BS bilaterally w/ increased accessory muscle use   HEART: Regular rate and rhythm; No murmurs, rubs, or gallops  ABDOMEN: Soft, Nontender, Nondistended; Bowel sounds present  EXTREMITIES: No clubbing, cyanosis, or edema       LABS:         Culture - Sputum (collected 16 Aug 2022 23:00)  Source: ET Tube ET Tube trap  Gram Stain (19 Aug 2022 10:02):    Few polymorphonuclear leukocytes seen per low power field    Few Squamous epithelial cells seen per low power field    Few Gram positive cocci in pairs seen per oil power field    Rare Gram Negative Rods seen per oil power field    Few Gram Positive Rods seen per oil power field  Final Report (19 Aug 2022 10:02):    Normal Respiratory Roro present    Culture - Blood (collected 16 Aug 2022 20:40)  Source: .Blood Blood-Peripheral  Preliminary Report (18 Aug 2022 10:01):    No growth to date.    Culture - Blood (collected 16 Aug 2022 20:15)  Source: .Blood Blood-Peripheral  Preliminary Report (18 Aug 2022 10:01):    No growth to date.      RADIOLOGY & ADDITIONAL TESTS:    08-19-22 @ 07:01  -  08-20-22 @ 07:00  --------------------------------------------------------  IN:  Total IN: 0 mL    OUT:    Voided (mL): 400 mL  Total OUT: 400 mL    Total NET: -400 mL

## 2022-08-20 NOTE — DISCHARGE NOTE PROVIDER - HOSPITAL COURSE
77 F w/ history of HTN, HLD, moderate-severe mitral regurgitation, grade II diastolic dysfunction, ESRD on HD (with non compliance), chronic hypoxic respiratory failure on home O2 3LNC, chronic anemia, dementia, depression, glaucoma, prior admissions for SOB/respiratory distress and elevated BP presented w/ hyperkalemia, hypertensive urgency and acute on chronic hypoxic/hypercarbic respiratory failure due to acute pulmonary edema from volume overload due to missed HD w/ acute on chronic diastolic dysfunction. Pt was intubated in emergency room. Advanced directives were addressed with family and pt was made DNR/DNI w/ plans for no further dialysis per pt's known prior wishes. Pt was put on comfort care w/ palliative extubation on 8/17. She was transferred to the medical floors on 8/19 on Versed, Dilaudid & a scopolamine patch and was discharged to [facility] the following day.    Discharge time: 43 minutes

## 2022-08-22 LAB
CULTURE RESULTS: SIGNIFICANT CHANGE UP
CULTURE RESULTS: SIGNIFICANT CHANGE UP
SPECIMEN SOURCE: SIGNIFICANT CHANGE UP
SPECIMEN SOURCE: SIGNIFICANT CHANGE UP

## 2022-11-03 NOTE — PATIENT PROFILE ADULT - LIVING ENVIRONMENT
Lumbar Transforaminal Epidural Steroid Injection (two levels)  [facility]      PREOPERATIVE DIAGNOSIS:  right Lumbar Radiculopathy    POSTOPERATIVE DIAGNOSIS:  Same as preop diagnosis    PROCEDURE:    1. CPT 35238 --  Diagnostic Transforaminal Epidural Steroid Injection at the L2 level, on the right   2. CPT 05177 --  Diagnostic Transforaminal Epidural Steroid Injection at the L4 level, on the right     PRE-PROCEDURE DISCUSSION WITH PATIENT:    Risks and complications were discussed with the patient prior to starting the procedure and informed consent was obtained.  We discussed various topics including but not limited to bleeding, infection, injury, nerve injury, paralysis, coma, death, postprocedural painful flare-up, postprocedural site soreness, and a lack of pain relief.  We discussed the diagnostic aspect of transforaminal epidural / selective nerve root blockade.    SURGEON:  Dylan Zamarripa MD    REASON FOR PROCEDURE:    Stenotic area is noted, and is likely contributing to this chronic &/or recurrent pain. , Radiating pattern of pain is likely consistent with degenerative changes in the area. and Radicular pain pattern seems consistent with this dermatome.    SEDATION:  Patient declined administration of moderate sedation    ANESTHETIC:  Marcaine 0.25%  STEROID:  Methylprednisolone (DEPO MEDROL) 80mg/ml    DESCRIPTON OF PROCEDURE:  After obtaining informed consent, an I.V. was not started in the preoperative area. The patient taken to the operating room and was placed in the prone position with a pillow under the abdomen.  All pressure points were well padded.  EKG, blood pressure, and pulse oximeter were monitored.  The lumbar area was prepped with Chloraprep and draped in a sterile fashion. Under fluoroscopic guidance in an oblique dimension on the above mentioned side, the transverse process of the first aforementioned vertebra at the junction of the body at 6 o'clock position was  identified. Skin and subcutaneous tissue was anesthetized with 1% lidocaine. A 22-gauge spinal needle was introduced under fluoroscopic guidance at the above junction into the foramen without parasthesias and into the epidural space. After confirming the position of the needle with PA, lateral, and oblique fluoroscopic views, aspiration was checked and was clear of blood or CSF.  Next, 1 mL of Omnipaque was injected. After seeing adequate spread on the corresponding nerve root, a total volume 1.5mL of injectate containing local anesthetic as above and half of the above mentioned corticosteroid was injected into the epidural space.  The needle was removed intact.      Next, in similar fashion, the second level mentioned above was addressed and a similar amount of injectate was delivered after similar imaging was achieved.      Vital signs were stable throughout.          ESTIMATED BLOOD LOSS:  <5 mL  SPECIMENS:  none    COMPLICATIONS:   No complications were noted., There was no indication of vascular uptake on live injection of contrast dye., There was no indication of intrathecal uptake on live injection of contrast dye., There was not any evidence of dural puncture.   and The patient did not have any signs of postprocedure numbness nor weakness.    TOLERANCE & DISCHARGE CONDITION:    The patient tolerated the procedure well.  The patient was transported to the recovery area without difficulties.  The patient was discharged to home under the care of family in stable and satisfactory condition.    PLAN OF CARE:  1. The patient was given our standard instruction sheet.  2. The patient will Return to clinic 2 wks. --- Of note, the right L4 transforaminal injection was technically challenging because of the foraminal aperture was stenotic but there is also overlying bones prior to negotiate.  Neurogram was appropriate however only a small percentage of the contrast agent tracked cephalad enough to give any amount of  epidurogram.  if there is diagnostic negativity or incomplete coverage then plan to return for a right L3 transforaminal injection  3. The patient will resume all medications as per the medication reconciliation sheet.           no

## 2023-06-22 NOTE — DISCHARGE NOTE PROVIDER - NSDCDCMDCOMP_GEN_ALL_CORE
This document is complete and the patient is ready for discharge. Has The Growth Been Previously Biopsied?: has been previously biopsied

## 2023-09-30 NOTE — ED ADULT NURSE NOTE - NSIMPLEMENTINTERV_GEN_ALL_ED
Robert Damon  Internal Medicine  [address] [place] [id number]  Phone: [phone]  Fax: [phone]  Follow Up Time:    Implemented All Universal Safety Interventions:  [place] to call system. Call bell, personal items and telephone within reach. Instruct patient to call for assistance. Room bathroom lighting operational. Non-slip footwear when patient is off stretcher. Physically safe environment: no spills, clutter or unnecessary equipment. Stretcher in lowest position, wheels locked, appropriate side rails in place.

## 2023-11-21 NOTE — DISCHARGE NOTE NURSING/CASE MANAGEMENT/SOCIAL WORK - NSFLUVACAGEDISCH_IMM_ALL_CORE
----- Message from Bret Haque sent at 11/21/2023 11:20 AM CST -----  Type: Needs Medical Advice  Who Called:  Fabiana Cabrera   Symptoms (please be specific):  pain management   Best Call Back Number: [phone] Ext [phone] Lauren Fax: [phone]   Additional Information: Lauren stated the referral for pt to have injection is needed for pt's procedure to be authorized please advise asap thanks! Pt needs referral for new provider for office visits and injection thanks! Referral needed by end of today for approval        Adult

## 2024-02-26 RX ORDER — LOPERAMIDE HCL 2 MG
1 TABLET ORAL
Qty: 0 | Refills: 0 | DISCHARGE

## 2024-02-26 RX ORDER — ATORVASTATIN CALCIUM 80 MG/1
1 TABLET, FILM COATED ORAL
Qty: 0 | Refills: 0 | DISCHARGE

## 2024-02-26 RX ORDER — CARVEDILOL PHOSPHATE 80 MG/1
1 CAPSULE, EXTENDED RELEASE ORAL
Qty: 0 | Refills: 0 | DISCHARGE

## 2024-02-26 RX ORDER — ASPIRIN/CALCIUM CARB/MAGNESIUM 324 MG
1 TABLET ORAL
Qty: 0 | Refills: 0 | DISCHARGE

## 2024-02-26 RX ORDER — NIFEDIPINE 30 MG
1 TABLET, EXTENDED RELEASE 24 HR ORAL
Qty: 0 | Refills: 0 | DISCHARGE

## 2024-02-26 RX ORDER — ONDANSETRON 8 MG/1
1 TABLET, FILM COATED ORAL
Qty: 0 | Refills: 0 | DISCHARGE

## 2024-02-26 RX ORDER — LABETALOL HCL 100 MG
1 TABLET ORAL
Qty: 0 | Refills: 0 | DISCHARGE

## 2025-01-01 NOTE — ED ADULT NURSE REASSESSMENT NOTE - NS ED NURSE REASSESS COMMENT FT1
Progress Note - Hospitalist   Name: Le Barnard 80 y.o. female I MRN: [id number]  Unit/Bed#: -01 I Date of Admission: 12/29/2024   Date of Service: 1/1/2025 I Hospital Day: 3    Assessment & Plan  Empyema lung (HCC)  Presented from SNF with chest discomfort, cough, weakness, lethargy, 92% on room air  Found to have large left-sided empyema  Discussion with daughter by ED physician, patient is DNR but will except antibiotic therapy and chest tube placement- ASA on hold  Continue Unasyn  Patient is status post IR guided thoracentesis with chest tube placement with over 600 cc of pus in left lung on 12/30/2024  Appreciate pulmonology consultation infectious disease consultation.    Patient currently off of oxygen  Body fluid culture is growing gram-negative rods,-maintain IV antibiotic  Sepsis (HCC)  Secondary to empyema, status post chest tube placement with purulent discharge  CT chest with left empyema  Continue IV antibiotic per ID recommendation with Unasyn,   Body fluid culture growing gram negative rods, continue IV antibiotic.  Patient remain off of oxygen  Hypokalemia  Potassium came back 2.9, continue to supplement  Hypomagnesemia  Mg 1.8  Resolved post replacement  Chronic anemia  Hemoglobin appears baseline  Continue ferrous sulfate  Hemoglobin baseline is greater than 9  Coronary artery disease involving native coronary artery of native heart without angina pectoris  Status post stent will restart aspirin post chest tube  Dementia (HCC)  At baseline knows her name and her birthday  LLL pneumonia  Status post chest tube placement due to empyema, continue antibiotic  Continue chest tube management per pulmonary/critical care recommendations  Acute retention of urine  Status post Nolan in place on 12/31st/24-follow retention protocol  Check ultrasound of kidney bladder to rule out any kind of obstruction.  Acute blood loss anemia  Last hemoglobin 7.9  Patient has chest tube in place, chest tube  #1 has some blood most likely due to tPA/dornase effect  Continue to monitor, consider to transfuse if hemoglobin less than 7      VTE Pharmacologic Prophylaxis: VTE Score: 5 High Risk (Score >/= 5) - Pharmacological DVT Prophylaxis Ordered: enoxaparin (Lovenox). Sequential Compression Devices Ordered.    Mobility:   Basic Mobility Inpatient Raw Score: 14  JH-HLM Goal: 4: Move to chair/commode  JH-HLM Achieved: 2: Bed activities/Dependent transfer  JH-HLM Goal NOT achieved. Continue with multidisciplinary rounding and encourage appropriate mobility to improve upon -HLM goals.    Patient Centered Rounds: I performed bedside rounds with nursing staff today.   Discussions with Specialists or Other Care Team Provider: Critical care    Education and Discussions with Family / Patient: Updated  (sister) via phone.    Current Length of Stay: 3 day(s)  Current Patient Status: Inpatient   Certification Statement: The patient will continue to require additional inpatient hospital stay due to monitor above condition  Discharge Plan: Anticipate discharge in >72 hrs to to be due to might    Code Status: Level 3 - DNAR and DNI    Subjective   Seen and evaluated during the rounding.  Resting comfortably but denies any significant complaint.    Objective :  Temp:  [97.5 °F (36.4 °C)-98.3 °F (36.8 °C)] 97.9 °F (36.6 °C)  HR:  [80-89] 80  BP: (140-157)/(69-85) 140/69  Resp:  [16-18] 16  SpO2:  [90 %-94 %] 94 %  O2 Device: None (Room air)    Body mass index is 25 kg/m².     Input and Output Summary (last 24 hours):     Intake/Output Summary (Last 24 hours) at 1/1/2025 0937  Last data filed at 1/1/2025 0845  Gross per 24 hour   Intake 1360 ml   Output 1405 ml   Net -45 ml       Physical Exam  Vitals and nursing note reviewed.   Constitutional:       Appearance: She is not ill-appearing or diaphoretic.   Eyes:      Extraocular Movements: Extraocular movements intact.      Conjunctiva/sclera: Conjunctivae normal.       0730- Pt received from previous shift s/p SOB. Pt on Bipap with O2 sat 100%. Pt had missed dialysis because she was sick, as per pt. AV fistula to left arm (+) bruit & thrill. Will cont' to monitor. 0730- Pt received from previous shift AAOx4, alert & awake s/p SOB. Pt on Bipap with O2 sat 100%. Pt had missed dialysis because she was sick, as per pt. AV fistula to left arm (+) bruit & thrill. Will cont' to monitor. Pupils: Pupils are equal, round, and reactive to light.   Cardiovascular:      Rate and Rhythm: Normal rate.      Heart sounds: No murmur heard.     No friction rub. No gallop.   Pulmonary:      Effort: Pulmonary effort is normal. No respiratory distress.      Breath sounds: No stridor. No wheezing or rhonchi.      Comments: Two chest tube in place  Abdominal:      General: There is no distension.      Palpations: There is no mass.      Tenderness: There is no abdominal tenderness.      Hernia: No hernia is present.   Musculoskeletal:      Right lower leg: No edema.      Left lower leg: No edema.   Neurological:      Mental Status: She is alert and oriented to person, place, and time.      Cranial Nerves: No cranial nerve deficit.      Sensory: No sensory deficit.      Motor: No weakness.      Coordination: Coordination normal.           Lines/Drains:  Lines/Drains/Airways       Active Status       Name Placement date Placement time Site Days    Chest Tube 1 Left Midaxillary 12 Fr. 12/30/24  0927  Midaxillary  2    Chest Tube 2 Left Midclavicular 12 Fr. 12/30/24  0958  Midclavicular  1    Urethral Catheter Latex 16 Fr. 12/31/24  1500  Latex  less than 1                  Urinary Catheter:  Goal for removal: Remove after 48 hrs of I/O monitoring                 Lab Results: I have reviewed the following results:   Results from last 7 days   Lab Units 01/01/25  0555   WBC Thousand/uL 11.21*   HEMOGLOBIN g/dL 7.9*   HEMATOCRIT % 24.9*   PLATELETS Thousands/uL 382   SEGS PCT % 75   LYMPHO PCT % 16   MONO PCT % 5   EOS PCT % 2     Results from last 7 days   Lab Units 01/01/25  0555   SODIUM mmol/L 140   POTASSIUM mmol/L 2.9*   CHLORIDE mmol/L 107   CO2 mmol/L 25   BUN mg/dL 11   CREATININE mg/dL 0.61   ANION GAP mmol/L 8   CALCIUM mg/dL 7.5*   ALBUMIN g/dL 2.4*   TOTAL BILIRUBIN mg/dL 0.42   ALK PHOS U/L 104   ALT U/L 11   AST U/L 13   GLUCOSE RANDOM mg/dL 127     Results from last 7 days   Lab Units 12/29/24  1849   INR   1.32*     Results from last 7 days   Lab Units 01/01/25  0748 12/31/24  2133 12/31/24  1546 12/31/24  1048 12/31/24  0658 12/30/24  2101 12/30/24  1636 12/30/24  1115 12/30/24  0759 12/29/24  2239   POC GLUCOSE mg/dl 134 145* 142* 198* 128 138 165* 174* 174* 233*     Results from last 7 days   Lab Units 12/29/24  1849   HEMOGLOBIN A1C % 9.0*     Results from last 7 days   Lab Units 12/31/24  0535 12/29/24  1849   LACTIC ACID mmol/L  --  1.1   PROCALCITONIN ng/ml 0.26*  --        Recent Cultures (last 7 days):   Results from last 7 days   Lab Units 12/30/24  0939 12/29/24  1932   BLOOD CULTURE   --  No Growth at 24 hrs.  No Growth at 24 hrs.   GRAM STAIN RESULT  4+ Polys*  4+ Gram negative rods*  --    BODY FLUID CULTURE, STERILE  No growth  --        Imaging Results Review: I reviewed radiology reports from this admission including: chest xray.  Other Study Results Review: No additional pertinent studies reviewed.    Last 24 Hours Medication List:     Current Facility-Administered Medications:     acetaminophen (TYLENOL) tablet 650 mg, Q6H PRN    albuterol inhalation solution 2.5 mg, Q4H PRN    alteplase (TPA) 10 mg in SWFI 30 mL chest tube/drain tube instillation, BID **AND** dornase elayne (PULMOZYME) 5 mg in 30 mL SWFI chest tube/drain tube instillation, BID    ampicillin-sulbactam (UNASYN) 3 g in sodium chloride 0.9 % 100 mL IVPB, Q6H, Last Rate: 3 g (01/01/25 0513)    Cholecalciferol (VITAMIN D3) tablet 2,000 Units, Daily    enoxaparin (LOVENOX) subcutaneous injection 40 mg, Daily    ferrous sulfate tablet 325 mg, Daily With Breakfast    insulin lispro (HumALOG/ADMELOG) 100 units/mL subcutaneous injection 1-5 Units, TID AC **AND** Fingerstick Glucose (POCT), TID AC    insulin lispro (HumALOG/ADMELOG) 100 units/mL subcutaneous injection 1-5 Units, HS    mirtazapine (REMERON) tablet 15 mg, HS    multi-electrolyte (PLASMALYTE-A/ISOLYTE-S PH 7.4) IV solution, Continuous, Last Rate: 100 mL/hr (01/01/25 8713)     pantoprazole (PROTONIX) EC tablet 40 mg, Early Morning    potassium chloride (Klor-Con M20) CR tablet 20 mEq, Once    potassium chloride 40 mEq IVPB (premix), Once    tamsulosin (FLOMAX) capsule 0.4 mg, Daily With Dinner    Administrative Statements   Today, Patient Was Seen By: Triston Pisano MD      **Please Note: This note may have been constructed using a voice recognition system.**

## 2025-01-27 NOTE — PATIENT PROFILE ADULT - PATIENT'S GENDER IDENTITY
Patient's blood pressure range in the last 24 hours was: BP  Min: 132/60  Max: 144/65.The patient's inpatient anti-hypertensive regimen is listed below:  Current Antihypertensives  NIFEdipine (PROCARDIA-XL) 24 hr tablet, Daily, Oral  labetaloL tablet 100 mg, Daily PRN, Oral  furosemide tablet 40 mg, Daily, Oral  carvediloL tablet 12.5 mg, 2 times daily, Oral    Plan  - BP is uncontrolled, will adjust as follows: antihypertensives held currently following pre-syncopal episode on 1/24  - Can consider restarting carvdilol, nifedipine and lisinopril as needed  - Held home lasix 40 mg daily, restart as tolerated   Female